# Patient Record
Sex: FEMALE | Race: WHITE | Employment: FULL TIME | URBAN - METROPOLITAN AREA
[De-identification: names, ages, dates, MRNs, and addresses within clinical notes are randomized per-mention and may not be internally consistent; named-entity substitution may affect disease eponyms.]

---

## 2017-02-09 ENCOUNTER — HOSPITAL ENCOUNTER (OUTPATIENT)
Age: 57
Setting detail: OUTPATIENT SURGERY
Discharge: HOME | End: 2017-02-09
Attending: ORTHOPAEDIC SURGERY | Admitting: ORTHOPAEDIC SURGERY
Payer: COMMERCIAL

## 2017-02-09 ENCOUNTER — ANESTHESIA EVENT (OUTPATIENT)
Dept: SURGERY | Age: 57
End: 2017-02-09
Payer: COMMERCIAL

## 2017-02-09 ENCOUNTER — ANESTHESIA (OUTPATIENT)
Dept: SURGERY | Age: 57
End: 2017-02-09
Payer: COMMERCIAL

## 2017-02-09 VITALS
SYSTOLIC BLOOD PRESSURE: 109 MMHG | DIASTOLIC BLOOD PRESSURE: 67 MMHG | BODY MASS INDEX: 29.23 KG/M2 | OXYGEN SATURATION: 98 % | WEIGHT: 165 LBS | TEMPERATURE: 97.7 F | HEART RATE: 77 BPM | RESPIRATION RATE: 16 BRPM

## 2017-02-09 PROCEDURE — 77030019940 HC BLNKT HYPOTHRM STRY -B: Performed by: NURSE ANESTHETIST, CERTIFIED REGISTERED

## 2017-02-09 PROCEDURE — 77030025157 HC CUF TRNQT 1 ZIMM -B: Performed by: ORTHOPAEDIC SURGERY

## 2017-02-09 PROCEDURE — 77030020143 HC AIRWY LARYN INTUB CGAS -A: Performed by: NURSE ANESTHETIST, CERTIFIED REGISTERED

## 2017-02-09 PROCEDURE — 76210000020 HC REC RM PH II FIRST 0.5 HR: Performed by: ORTHOPAEDIC SURGERY

## 2017-02-09 PROCEDURE — 76060000032 HC ANESTHESIA 0.5 TO 1 HR: Performed by: ORTHOPAEDIC SURGERY

## 2017-02-09 PROCEDURE — 74011250636 HC RX REV CODE- 250/636: Performed by: ORTHOPAEDIC SURGERY

## 2017-02-09 PROCEDURE — 76210000063 HC OR PH I REC FIRST 0.5 HR: Performed by: ORTHOPAEDIC SURGERY

## 2017-02-09 PROCEDURE — 77030002933 HC SUT MCRYL J&J -A: Performed by: ORTHOPAEDIC SURGERY

## 2017-02-09 PROCEDURE — 74011250637 HC RX REV CODE- 250/637: Performed by: ANESTHESIOLOGY

## 2017-02-09 PROCEDURE — 74011000250 HC RX REV CODE- 250: Performed by: ORTHOPAEDIC SURGERY

## 2017-02-09 PROCEDURE — 74011250636 HC RX REV CODE- 250/636

## 2017-02-09 PROCEDURE — 77030011640 HC PAD GRND REM COVD -A: Performed by: ORTHOPAEDIC SURGERY

## 2017-02-09 PROCEDURE — 77030002916 HC SUT ETHLN J&J -A: Performed by: ORTHOPAEDIC SURGERY

## 2017-02-09 PROCEDURE — 74011000250 HC RX REV CODE- 250

## 2017-02-09 PROCEDURE — 77030018836 HC SOL IRR NACL ICUM -A: Performed by: ORTHOPAEDIC SURGERY

## 2017-02-09 PROCEDURE — 76010000138 HC OR TIME 0.5 TO 1 HR: Performed by: ORTHOPAEDIC SURGERY

## 2017-02-09 PROCEDURE — 74011250636 HC RX REV CODE- 250/636: Performed by: ANESTHESIOLOGY

## 2017-02-09 PROCEDURE — 77030011884 HC TAPE CST PLSTR BSNM -A: Performed by: ORTHOPAEDIC SURGERY

## 2017-02-09 RX ORDER — DEXAMETHASONE SODIUM PHOSPHATE 4 MG/ML
INJECTION, SOLUTION INTRA-ARTICULAR; INTRALESIONAL; INTRAMUSCULAR; INTRAVENOUS; SOFT TISSUE AS NEEDED
Status: DISCONTINUED | OUTPATIENT
Start: 2017-02-09 | End: 2017-02-09 | Stop reason: HOSPADM

## 2017-02-09 RX ORDER — BUPIVACAINE HYDROCHLORIDE 5 MG/ML
INJECTION, SOLUTION EPIDURAL; INTRACAUDAL AS NEEDED
Status: DISCONTINUED | OUTPATIENT
Start: 2017-02-09 | End: 2017-02-09 | Stop reason: HOSPADM

## 2017-02-09 RX ORDER — SODIUM CHLORIDE 0.9 % (FLUSH) 0.9 %
5-10 SYRINGE (ML) INJECTION AS NEEDED
Status: DISCONTINUED | OUTPATIENT
Start: 2017-02-09 | End: 2017-02-09 | Stop reason: HOSPADM

## 2017-02-09 RX ORDER — CEFAZOLIN SODIUM IN 0.9 % NACL 2 G/50 ML
2 INTRAVENOUS SOLUTION, PIGGYBACK (ML) INTRAVENOUS ONCE
Status: COMPLETED | OUTPATIENT
Start: 2017-02-09 | End: 2017-02-09

## 2017-02-09 RX ORDER — FAMOTIDINE 20 MG/1
20 TABLET, FILM COATED ORAL ONCE
Status: COMPLETED | OUTPATIENT
Start: 2017-02-09 | End: 2017-02-09

## 2017-02-09 RX ORDER — SODIUM CHLORIDE 0.9 % (FLUSH) 0.9 %
5-10 SYRINGE (ML) INJECTION EVERY 8 HOURS
Status: DISCONTINUED | OUTPATIENT
Start: 2017-02-09 | End: 2017-02-09 | Stop reason: HOSPADM

## 2017-02-09 RX ORDER — LIDOCAINE HYDROCHLORIDE 10 MG/ML
0.3 INJECTION INFILTRATION; PERINEURAL ONCE
Status: DISCONTINUED | OUTPATIENT
Start: 2017-02-09 | End: 2017-02-09 | Stop reason: HOSPADM

## 2017-02-09 RX ORDER — ONDANSETRON 2 MG/ML
INJECTION INTRAMUSCULAR; INTRAVENOUS AS NEEDED
Status: DISCONTINUED | OUTPATIENT
Start: 2017-02-09 | End: 2017-02-09 | Stop reason: HOSPADM

## 2017-02-09 RX ORDER — FENTANYL CITRATE 50 UG/ML
INJECTION, SOLUTION INTRAMUSCULAR; INTRAVENOUS AS NEEDED
Status: DISCONTINUED | OUTPATIENT
Start: 2017-02-09 | End: 2017-02-09 | Stop reason: HOSPADM

## 2017-02-09 RX ORDER — HYDROMORPHONE HYDROCHLORIDE 2 MG/ML
0.5 INJECTION, SOLUTION INTRAMUSCULAR; INTRAVENOUS; SUBCUTANEOUS
Status: DISCONTINUED | OUTPATIENT
Start: 2017-02-09 | End: 2017-02-09 | Stop reason: HOSPADM

## 2017-02-09 RX ORDER — SODIUM CHLORIDE, SODIUM LACTATE, POTASSIUM CHLORIDE, CALCIUM CHLORIDE 600; 310; 30; 20 MG/100ML; MG/100ML; MG/100ML; MG/100ML
100 INJECTION, SOLUTION INTRAVENOUS CONTINUOUS
Status: DISCONTINUED | OUTPATIENT
Start: 2017-02-09 | End: 2017-02-09 | Stop reason: HOSPADM

## 2017-02-09 RX ORDER — ACETAMINOPHEN 500 MG
1000 TABLET ORAL ONCE
Status: COMPLETED | OUTPATIENT
Start: 2017-02-09 | End: 2017-02-09

## 2017-02-09 RX ORDER — MIDAZOLAM HYDROCHLORIDE 1 MG/ML
2 INJECTION, SOLUTION INTRAMUSCULAR; INTRAVENOUS
Status: COMPLETED | OUTPATIENT
Start: 2017-02-09 | End: 2017-02-09

## 2017-02-09 RX ORDER — PROPOFOL 10 MG/ML
INJECTION, EMULSION INTRAVENOUS AS NEEDED
Status: DISCONTINUED | OUTPATIENT
Start: 2017-02-09 | End: 2017-02-09 | Stop reason: HOSPADM

## 2017-02-09 RX ORDER — LIDOCAINE HYDROCHLORIDE 20 MG/ML
INJECTION, SOLUTION EPIDURAL; INFILTRATION; INTRACAUDAL; PERINEURAL AS NEEDED
Status: DISCONTINUED | OUTPATIENT
Start: 2017-02-09 | End: 2017-02-09 | Stop reason: HOSPADM

## 2017-02-09 RX ORDER — OXYCODONE HYDROCHLORIDE 5 MG/1
10 TABLET ORAL
Status: DISCONTINUED | OUTPATIENT
Start: 2017-02-09 | End: 2017-02-09 | Stop reason: HOSPADM

## 2017-02-09 RX ADMIN — FENTANYL CITRATE 25 MCG: 50 INJECTION, SOLUTION INTRAMUSCULAR; INTRAVENOUS at 13:29

## 2017-02-09 RX ADMIN — DEXAMETHASONE SODIUM PHOSPHATE 4 MG: 4 INJECTION, SOLUTION INTRA-ARTICULAR; INTRALESIONAL; INTRAMUSCULAR; INTRAVENOUS; SOFT TISSUE at 13:32

## 2017-02-09 RX ADMIN — FENTANYL CITRATE 25 MCG: 50 INJECTION, SOLUTION INTRAMUSCULAR; INTRAVENOUS at 13:19

## 2017-02-09 RX ADMIN — ONDANSETRON 4 MG: 2 INJECTION INTRAMUSCULAR; INTRAVENOUS at 13:32

## 2017-02-09 RX ADMIN — FAMOTIDINE 20 MG: 20 TABLET ORAL at 12:43

## 2017-02-09 RX ADMIN — LIDOCAINE HYDROCHLORIDE 100 MG: 20 INJECTION, SOLUTION EPIDURAL; INFILTRATION; INTRACAUDAL; PERINEURAL at 13:19

## 2017-02-09 RX ADMIN — FENTANYL CITRATE 50 MCG: 50 INJECTION, SOLUTION INTRAMUSCULAR; INTRAVENOUS at 13:32

## 2017-02-09 RX ADMIN — ACETAMINOPHEN 1000 MG: 500 TABLET, FILM COATED ORAL at 12:43

## 2017-02-09 RX ADMIN — CEFAZOLIN 2 G: 1 INJECTION, POWDER, FOR SOLUTION INTRAMUSCULAR; INTRAVENOUS; PARENTERAL at 13:22

## 2017-02-09 RX ADMIN — SODIUM CHLORIDE, SODIUM LACTATE, POTASSIUM CHLORIDE, AND CALCIUM CHLORIDE 100 ML/HR: 600; 310; 30; 20 INJECTION, SOLUTION INTRAVENOUS at 12:44

## 2017-02-09 RX ADMIN — MIDAZOLAM HYDROCHLORIDE 2 MG: 1 INJECTION, SOLUTION INTRAMUSCULAR; INTRAVENOUS at 12:46

## 2017-02-09 RX ADMIN — PROPOFOL 200 MG: 10 INJECTION, EMULSION INTRAVENOUS at 13:19

## 2017-02-09 NOTE — IP AVS SNAPSHOT
Current Discharge Medication List  
  
ASK your doctor about these medications Dose & Instructions Dispensing Information Comments Morning Noon Evening Bedtime Biotin 2,500 mcg Cap Your next dose is: Today, Tomorrow Other:  ____________ Dose:  1 Tab Take 1 Tab by mouth daily. Hold for surgery- last dose 2/6/17 Refills:  0  
     
   
   
   
  
 buPROPion  mg tablet Commonly known as:  Patti Chavezr Your next dose is: Today, Tomorrow Other:  ____________ Dose:  150 mg Take 1 Tab by mouth every morning. Indications: ANXIETY WITH DEPRESSION Quantity:  30 Tab Refills:  11 CALCIUM 600 WITH VITAMIN D3 600 mg(1,500mg) -400 unit Cap Generic drug:  Calcium-Cholecalciferol (D3) Your next dose is: Today, Tomorrow Other:  ____________ Dose:  1 Tab Take 1 Tab by mouth daily. Hold for surgery- last dose 2/6/17 Refills:  0  
     
   
   
   
  
 clonazePAM 1 mg tablet Commonly known as:  Beatrice Cos Your next dose is: Today, Tomorrow Other:  ____________ Take one tablet every evening. Quantity:  30 Tab Refills:  5 FISH OIL 1,000 mg Cap Generic drug:  omega-3 fatty acids-vitamin e Your next dose is: Today, Tomorrow Other:  ____________ Dose:  1 Cap Take 1 Cap by mouth daily. Hold for surgery- last dose 2/6/17 Refills:  0  
     
   
   
   
  
 promethazine 12.5 mg tablet Commonly known as:  PHENERGAN Your next dose is: Today, Tomorrow Other:  ____________ Dose:  12.5 mg Take 1 Tab by mouth every six (6) hours as needed for Nausea for 7 days. Quantity:  20 Tab Refills:  0  
     
   
   
   
  
 VITAMIN C PO Your next dose is: Today, Tomorrow Other:  ____________ Dose:  1 Tab Take 1 Tab by mouth daily. Hold for surgery- last dose 2/6/17 Refills:  0  
     
   
   
   
  
 vitamin e 400 unit Tab Your next dose is: Today, Tomorrow Other:  ____________ Dose:  1 Tab Take 1 Tab by mouth daily. Hold for surgery- last dose 2/6/17 Refills:  0 ZINC ACETATE PO Your next dose is: Today, Tomorrow Other:  ____________ Dose:  1 Tab Take 1 Tab by mouth daily. Hold for surgery- last dose 2/6/17 Refills:  0

## 2017-02-09 NOTE — ANESTHESIA POSTPROCEDURE EVALUATION
Post-Anesthesia Evaluation and Assessment    Patient: Alec Montalvo MRN: [id number]  SSN: [id number]    YOB: 1960  Age: 55 y.o. Sex: female       Cardiovascular Function/Vital Signs  Visit Vitals    /67    Pulse 77    Temp 36.5 °C (97.7 °F)    Resp 16    Wt 74.8 kg (165 lb)    SpO2 98%    BMI 29.23 kg/m2       Patient is status post general anesthesia for Procedure(s):  RIGHT 1ST METATARSOPHALANGEAL CHEILECTOMY. Nausea/Vomiting: None    Postoperative hydration reviewed and adequate. Pain:  Pain Scale 1: Numeric (0 - 10) (02/09/17 1412)  Pain Intensity 1: 0 (02/09/17 1412)   Managed    Neurological Status:   Neuro (WDL): Within Defined Limits (02/09/17 1412)   At baseline    Mental Status and Level of Consciousness: Alert and oriented     Pulmonary Status:   O2 Device: Room air (02/09/17 1412)   Adequate oxygenation and airway patent    Complications related to anesthesia: None    Post-anesthesia assessment completed.  No concerns    Signed By: Shanae Crain MD     February 9, 2017

## 2017-02-09 NOTE — DISCHARGE INSTRUCTIONS
INSTRUCTIONS FOLLOWING FOOT SURGERY    ACTIVITY  Elevate foot   Protected partial weight bearing on the heel only as tolerated in post op shoe after full sensation retur  Take one 325mg aspirin daily if okay with your medical doctor and you have no GI ulcer. Get up and out of bed frequently. While in bed move the legs as much as possible)    DIET  Clear liquids until no nausea or vomiting; then light diet for the first day. Advance to regular diet on second day, unless your doctor orders otherwise. PAIN  Take pain medications as directed by your doctor. Call your doctor if pain is NOT relieved by medication. DRESSING CARE Keep dry and in place until follow up appointment    CALL YOUR DOCTOR IF YOU HAVE  Excessive bleeding that does not stop after holding mild pressure over the area. Temperature of 101 degrees or above. Redness, excessive swelling or bruising, and/or green or yellow, smelly discharge from incision. Loss of sensation - cold, white or blue toes. AFTER ANESTHESIA  For the first 24 hours and while taking narcotics for pain: DO NOT Drive, Drink Alcoholic beverages, or make important Decisions. Be aware of dizziness following anesthesia and while taking pain medication. Preventing Infection at Home  We care about preventing infection and avoiding the spread of germs - not only when you are in the hospital but also when you return home. When you return home from the hospital, its important to take the following steps to help prevent infection and avoid spreading germs that could infect you and others. Ask everyone in your home to follow these guidelines, too. Clean Your Hands  · Clean your hands whenever your hands are visibly dirty, before you eat, before or after touching your mouth, nose or eyes, and before preparing food. Clean them after contact with body fluids, using the restroom, touching animals or changing diapers.   · When washing hands, wet them with warm water and work up a lather. Rub hands for at least 15 seconds, then rinse them and pat them dry with a clean towel or paper towel. · When using hand sanitizers, it should take about 15 seconds to rub your hands dry. If not, you probably didnt apply enough . Cover Your Sneeze or Cough  Germs are released into the air whenever you sneeze or cough. To prevent the spread of infection:  · Turn away from other people before coughing or sneezing. · Cover your mouth or nose with a tissue when you cough or sneeze. Put the tissue in the trash. · If you dont have a tissue, cough or sneeze into your upper sleeve, not your hands. · Always clean your hands after coughing or sneezing. Care for Wounds  Your skin is your bodys first line of defense against germs, but an open wound leaves an easy way for germs to enter your body. To prevent infection:  · Clean your hands before and after changing wound dressings, and wear gloves to change dressings if recommended by your doctor. · Take special care with IV lines or other devices inserted into the body. If you must touch them, clean your hands first.  · Follow any specific instructions from your doctor to care for your wounds. Contact your doctor if you experience any signs of infection, such as fever or increased redness at the surgical or wound site. Keep a Clean Home  · Clean or wipe commonly touched hard surfaces like door handles, sinks, tabletops, phones and TV remotes. · Use products labeled disinfectant to kill harmful bacteria and viruses. · Use a clean cloth or paper towel to clean and dry surfaces. Wiping surfaces with a dirty dishcloth, sponge or towel will only spread germs. · Never share toothbrushes, xiong, drinking glasses, utensils, razor blades, face cloths or bath towels to avoid spreading germs. · Be sure that the linens that you sleep on are clean. · Keep pets away from wounds and wash your hands after touching pets, their toys or bedding.     We care about you and your health. Remember, preventing infections is a team effort between you, your family, friends and health care providers. APPOINTMENT DATE/TIME Keep as scheduled    YOUR DOCTOR'S PHONE NUMBER [phone]      DISCHARGE SUMMARY from Nurse    PATIENT INSTRUCTIONS:    After general anesthesia or intravenous sedation, for 24 hours or while taking prescription Narcotics:  · Limit your activities  · Do not drive and operate hazardous machinery  · Do not make important personal or business decisions  · Do  not drink alcoholic beverages  · If you have not urinated within 8 hours after discharge, please contact your surgeon on call. *  Please give a list of your current medications to your Primary Care Provider. *  Please update this list whenever your medications are discontinued, doses are      changed, or new medications (including over-the-counter products) are added. *  Please carry medication information at all times in case of emergency situations. These are general instructions for a healthy lifestyle:    No smoking/ No tobacco products/ Avoid exposure to second hand smoke    Surgeon General's Warning:  Quitting smoking now greatly reduces serious risk to your health. Obesity, smoking, and sedentary lifestyle greatly increases your risk for illness    A healthy diet, regular physical exercise & weight monitoring are important for maintaining a healthy lifestyle    You may be retaining fluid if you have a history of heart failure or if you experience any of the following symptoms:  Weight gain of 3 pounds or more overnight or 5 pounds in a week, increased swelling in our hands or feet or shortness of breath while lying flat in bed. Please call your doctor as soon as you notice any of these symptoms; do not wait until your next office visit.     Recognize signs and symptoms of STROKE:    F-face looks uneven    A-arms unable to move or move unevenly    S-speech slurred or non-existent    T-time-call 911 as soon as signs and symptoms begin-DO NOT go       Back to bed or wait to see if you get better-TIME IS BRAIN.

## 2017-02-09 NOTE — BRIEF OP NOTE
BRIEF OPERATIVE NOTE    Date of Procedure: 2/9/2017   Preoperative Diagnosis: Hallux rigidus of right foot [M20.21]  Postoperative Diagnosis: Hallux rigidus of right foot    Procedure(s):  RIGHT 1ST METATARSOPHALANGEAL CHEILECTOMY  Surgeon(s) and Role:     * Shannon Lynne MD - Primary            Surgical Staff:  Circ-1: Giovanni Will RN  Scrub Tech-1: Vane Hampton  Event Time In   Incision Start 1329   Incision Close 1343     Anesthesia: General   Estimated Blood Loss: min  Specimens: * No specimens in log *   Findings: no  Complications: no  Implants: * No implants in log *

## 2017-02-09 NOTE — IP AVS SNAPSHOT
[address] 
[phone] Patient: Colletta Ceo MRN: [id number] :1960 You are allergic to the following Allergen Reactions Lamisil (Terbinafine) Rash Recent Documentation Weight BMI OB Status Smoking Status 74.8 kg 29.23 kg/m2 Postmenopausal Former Smoker Emergency Contacts Name Discharge Info Relation Home Work Mobile Haroon Rea DISCHARGE CAREGIVER [3] Spouse [3] [phone] About your hospitalization You were admitted on:  2017 You last received care in the:  [facility] OP PACU You were discharged on:  2017 Unit phone number:  [phone] Why you were hospitalized Your primary diagnosis was:  Not on File Providers Seen During Your Hospitalizations Provider Role Specialty Primary office phone Lissa Bose MD Attending Provider Orthopedic Surgery [phone] Your Primary Care Physician (PCP) Primary Care Physician Office Phone Office Fax Maria Antonia Bates [phone] Follow-up Information None Your Appointments  CHEILECTOMY with Lissa Bose MD  
[facility] [address] ([address] [place] [address]) [address]  
[phone] 2017  9:15 AM EDT  
[facility] SCREENING with [facility] BI ROOM 1 [facility] [place] [facility] ([address] [place] [address]) [address] [place] [address]  
[phone] ***** NOTE: Appointments for the Mobile Mammography UNIT CANNOT be made on My Chart *****  PATIENT ARRIVAL - Please report 30 minutes early to check in. GENERAL INSTRUCTIONS -  On the day of your exam do not use any bath powder, deodorant or lotions on the chest or armpit area.   Wear two-piece outfit for ease of changing. Allow at least 1 hour for test.  
  
    
  
  
 
  
  
  
Current Discharge Medication List  
  
ASK your doctor about these medications Dose & Instructions Dispensing Information Comments Morning Noon Evening Bedtime Biotin 2,500 mcg Cap Your next dose is: Today, Tomorrow Other:  _________ Dose:  1 Tab Take 1 Tab by mouth daily. Hold for surgery- last dose 2/6/17 Refills:  0  
     
   
   
   
  
 buPROPion  mg tablet Commonly known as:  Pankaj Greaser Your next dose is: Today, Tomorrow Other:  _________ Dose:  150 mg Take 1 Tab by mouth every morning. Indications: ANXIETY WITH DEPRESSION Quantity:  30 Tab Refills:  11 CALCIUM 600 WITH VITAMIN D3 600 mg(1,500mg) -400 unit Cap Generic drug:  Calcium-Cholecalciferol (D3) Your next dose is: Today, Tomorrow Other:  _________ Dose:  1 Tab Take 1 Tab by mouth daily. Hold for surgery- last dose 2/6/17 Refills:  0  
     
   
   
   
  
 clonazePAM 1 mg tablet Commonly known as:  Paulo Neo Your next dose is: Today, Tomorrow Other:  _________ Take one tablet every evening. Quantity:  30 Tab Refills:  5 FISH OIL 1,000 mg Cap Generic drug:  omega-3 fatty acids-vitamin e Your next dose is: Today, Tomorrow Other:  _________ Dose:  1 Cap Take 1 Cap by mouth daily. Hold for surgery- last dose 2/6/17 Refills:  0  
     
   
   
   
  
 promethazine 12.5 mg tablet Commonly known as:  PHENERGAN Your next dose is: Today, Tomorrow Other:  _________ Dose:  12.5 mg Take 1 Tab by mouth every six (6) hours as needed for Nausea for 7 days. Quantity:  20 Tab Refills:  0  
     
   
   
   
  
 VITAMIN C PO Your next dose is: Today, Tomorrow Other:  _________ Dose:  1 Tab Take 1 Tab by mouth daily. Hold for surgery- last dose 2/6/17 Refills:  0  
     
   
   
   
  
 vitamin e 400 unit Tab Your next dose is: Today, Tomorrow Other:  _________ Dose:  1 Tab Take 1 Tab by mouth daily. Hold for surgery- last dose 2/6/17 Refills:  0 ZINC ACETATE PO Your next dose is: Today, Tomorrow Other:  _________ Dose:  1 Tab Take 1 Tab by mouth daily. Hold for surgery- last dose 2/6/17 Refills:  0 Discharge Instructions INSTRUCTIONS FOLLOWING FOOT SURGERY 
 
ACTIVITY Elevate foot Protected partial weight bearing on the heel only as tolerated in post op shoe after full sensation retur Take one 325mg aspirin daily if okay with your medical doctor and you have no GI ulcer. Get up and out of bed frequently. While in bed move the legs as much as possible) DIET Clear liquids until no nausea or vomiting; then light diet for the first day. Advance to regular diet on second day, unless your doctor orders otherwise. PAIN Take pain medications as directed by your doctor. Call your doctor if pain is NOT relieved by medication. DRESSING CARE Keep dry and in place until follow up appointment CALL YOUR DOCTOR IF YOU HAVE Excessive bleeding that does not stop after holding mild pressure over the area. Temperature of 101 degrees or above. Redness, excessive swelling or bruising, and/or green or yellow, smelly discharge from incision. Loss of sensation - cold, white or blue toes. AFTER ANESTHESIA For the first 24 hours and while taking narcotics for pain: DO NOT Drive, Drink Alcoholic beverages, or make important Decisions. Be aware of dizziness following anesthesia and while taking pain medication. Preventing Infection at Home We care about preventing infection and avoiding the spread of germs  not only when you are in the hospital but also when you return home. When you return home from the hospital, its important to take the following steps to help prevent infection and avoid spreading germs that could infect you and others. Ask everyone in your home to follow these guidelines, too. Clean Your Hands · Clean your hands whenever your hands are visibly dirty, before you eat, before or after touching your mouth, nose or eyes, and before preparing food. Clean them after contact with body fluids, using the restroom, touching animals or changing diapers. · When washing hands, wet them with warm water and work up a lather. Rub hands for at least 15 seconds, then rinse them and pat them dry with a clean towel or paper towel. · When using hand sanitizers, it should take about 15 seconds to rub your hands dry. If not, you probably didnt apply enough . Cover Your Sneeze or Cough Germs are released into the air whenever you sneeze or cough. To prevent the spread of infection: · Turn away from other people before coughing or sneezing. · Cover your mouth or nose with a tissue when you cough or sneeze. Put the tissue in the trash. · If you dont have a tissue, cough or sneeze into your upper sleeve, not your hands. · Always clean your hands after coughing or sneezing. Care for Wounds Your skin is your bodys first line of defense against germs, but an open wound leaves an easy way for germs to enter your body. To prevent infection: · Clean your hands before and after changing wound dressings, and wear gloves to change dressings if recommended by your doctor. · Take special care with IV lines or other devices inserted into the body. If you must touch them, clean your hands first. 
· Follow any specific instructions from your doctor to care for your wounds. Contact your doctor if you experience any signs of infection, such as fever or increased redness at the surgical or wound site. Keep a [address] [address] · Clean or wipe commonly touched hard surfaces like door handles, sinks, tabletops, phones and TV remotes. · Use products labeled disinfectant to kill harmful bacteria and viruses. · Use a clean cloth or paper towel to clean and dry surfaces. Wiping surfaces with a dirty dishcloth, sponge or towel will only spread germs. · Never share toothbrushes, xiong, drinking glasses, utensils, razor blades, face cloths or bath towels to avoid spreading germs. · Be sure that the linens that you sleep on are clean. · Keep pets away from wounds and wash your hands after touching pets, their toys or bedding. We care about you and your health. Remember, preventing infections is a team effort between you, your family, friends and health care providers. APPOINTMENT DATE/TIME Keep as scheduled YOUR DOCTOR'S PHONE NUMBER [phone] DISCHARGE SUMMARY from Nurse PATIENT INSTRUCTIONS: 
 
After general anesthesia or intravenous sedation, for 24 hours or while taking prescription Narcotics: · Limit your activities · Do not drive and operate hazardous machinery · Do not make important personal or business decisions · Do  not drink alcoholic beverages · If you have not urinated within 8 hours after discharge, please contact your surgeon on call. *  Please give a list of your current medications to your Primary Care Provider. *  Please update this list whenever your medications are discontinued, doses are 
    changed, or new medications (including over-the-counter products) are added. *  Please carry medication information at all times in case of emergency situations. These are general instructions for a healthy lifestyle: No smoking/ No tobacco products/ Avoid exposure to second hand smoke Surgeon General's Warning:  Quitting smoking now greatly reduces serious risk to your health. Obesity, smoking, and sedentary lifestyle greatly increases your risk for illness A healthy diet, regular physical exercise & weight monitoring are important for maintaining a healthy lifestyle You may be retaining fluid if you have a history of heart failure or if you experience any of the following symptoms:  Weight gain of 3 pounds or more overnight or 5 pounds in a week, increased swelling in our hands or feet or shortness of breath while lying flat in bed. Please call your doctor as soon as you notice any of these symptoms; do not wait until your next office visit. Recognize signs and symptoms of STROKE: 
 
F-face looks uneven A-arms unable to move or move unevenly S-speech slurred or non-existent T-time-call 911 as soon as signs and symptoms begin-DO NOT go Back to bed or wait to see if you get better-TIME IS BRAIN. Discharge Orders None Introducing [facility]! Morteza Vann introduces [employer] patient portal. Now you can access parts of your medical record, email your doctor's office, and request medication refills online. 1. In your internet browser, go to [email or web address] 2. Click on the First Time User? Click Here link in the Sign In box. You will see the New Member Sign Up page. 3. Enter your [employer] Access Code exactly as it appears below. You will not need to use this code after youve completed the sign-up process. If you do not sign up before the expiration date, you must request a new code. · [employer] Access Code: [id number] [facility] Expires: 4/26/2017  4:13 PM 
 
4. Enter the last four digits of your Social Security Number (xxxx) and Date of Birth (mm/dd/yyyy) as indicated and click Submit. You will be taken to the next sign-up page. 5. Create a [employer] ID. This will be your [employer] login ID and cannot be changed, so think of one that is secure and easy to remember. 6. Create a [employer] password. You can change your password at any time. 7. Enter your Password Reset Question and Answer.  This can be used at a later time if you forget your password. 8. Enter your e-mail address. You will receive e-mail notification when new information is available in [address] 9. Click Sign Up. You can now view and download portions of your medical record. 10. Click the Download Summary menu link to download a portable copy of your medical information. If you have questions, please visit the Frequently Asked Questions section of the [email or web address] website. Remember, [email or web address] is NOT to be used for urgent needs. For medical emergencies, dial 911. Now available from your iPhone and Android! General Information Please provide this summary of care documentation to your next provider. Patient Signature:  ____________________________________________________________ Date:  ____________________________________________________________  
  
Izzy Mo Provider Signature:  ____________________________________________________________ Date:  ____________________________________________________________

## 2017-02-09 NOTE — ANESTHESIA PREPROCEDURE EVALUATION
Anesthetic History   No history of anesthetic complications            Review of Systems / Medical History  Patient summary reviewed and pertinent labs reviewed    Pulmonary  Within defined limits                 Neuro/Psych         Psychiatric history (anxiety)     Cardiovascular                  Exercise tolerance: >4 METS  Comments: RBBB   GI/Hepatic/Renal  Within defined limits              Endo/Other  Within defined limits           Other Findings              Physical Exam    Airway  Mallampati: I  TM Distance: 4 - 6 cm  Neck ROM: normal range of motion   Mouth opening: Normal     Cardiovascular    Rhythm: regular  Rate: normal         Dental  No notable dental hx       Pulmonary  Breath sounds clear to auscultation               Abdominal         Other Findings            Anesthetic Plan    ASA: 1  Anesthesia type: general          Induction: Intravenous  Anesthetic plan and risks discussed with: Patient

## 2017-02-10 NOTE — OP NOTES
[facility] 66   OPERATIVE REPORT       Name:  Maeve Thomson   MR#:  [id number]   :  1960   Account #:  [de-identified]   Date of Adm:  2017       DATE OF PROCEDURE: 2017     PREOPERATIVE DIAGNOSIS: Right hallux rigidus. POSTOPERATIVE DIAGNOSIS: Right hallux rigidus. PROCEDURE PERFORMED: Right first metatarsophalangeal   cheilectomy. SURGEON: Mechelle Alonzo MD    ANESTHESIA: General anesthesia with LMA. ESTIMATED BLOOD LOSS: Minimal.      TOURNIQUET TIME: 15 minutes at 250 mmHg. ANTIBIOTIC PROPHYLAXIS: Ancef given prior to procedure. INDICATIONS FOR PROCEDURE: The patient is a 50-year-old white   female with symptomatic right grade 2 hallux rigidus who has   failed conservative therapy and desires surgical treatment. Risks and benefits of procedure including, but not limited to   risk of anesthetic complications including myocardial   infarction, stroke, and death, as well as surgical complications   including damage to nerves and blood vessels, risk of infection,   incomplete pain relief, risk of recurrence, need for additional   surgery have been discussed at length with the patient. She   understands the risks and wishes to proceed with surgery at this   time. DETAILS OF PROCEDURE: The patient's correct operative site was   marked with indelible ink in the preoperative holding area. She   was brought to the operating room, placed supine. During a preop   surgical timeout, the right lower extremity was identified as   the correct surgical site and prepped and draped in a standard   sterile fashion using ChloraPrep solution. Dorsal approach to   the first MTP joint was performed, followed by longitudinal   capsulotomy. The dorsal osteophytes were then removed off the   metatarsal head using a sagittal saw and a rongeur was then used   to decompress osteophytes off the base of the proximal phalanx   as well as decompress both gutters.  The wound was then irrigated   and closed using Vicryl in the capsule, followed by Monocryl and   nylon sutures on the skin. A sterile dressing was then applied. Anesthesia was discontinued. The patient was subsequently   transferred back to the recovery bed and taken to the recovery   room in satisfactory condition. She appeared to tolerate the   procedure well. There were no apparent surgical or anesthetic   complications. All needle and sponge counts were correct.         MD Carlos [place] / Nguyen Bias   D:  02/09/2017   16:49   T:  02/09/2017   19:38   Job #:  [id number]

## 2017-02-16 ENCOUNTER — APPOINTMENT (OUTPATIENT)
Dept: CT IMAGING | Age: 57
End: 2017-02-16
Attending: EMERGENCY MEDICINE
Payer: COMMERCIAL

## 2017-02-16 ENCOUNTER — HOSPITAL ENCOUNTER (EMERGENCY)
Age: 57
Discharge: HOME | End: 2017-02-16
Attending: EMERGENCY MEDICINE
Payer: COMMERCIAL

## 2017-02-16 VITALS
SYSTOLIC BLOOD PRESSURE: 153 MMHG | HEART RATE: 84 BPM | DIASTOLIC BLOOD PRESSURE: 73 MMHG | TEMPERATURE: 98 F | RESPIRATION RATE: 16 BRPM | OXYGEN SATURATION: 99 % | HEIGHT: 63 IN | WEIGHT: 163 LBS | BODY MASS INDEX: 28.88 KG/M2

## 2017-02-16 DIAGNOSIS — H53.8 BLURRED VISION: Primary | ICD-10-CM

## 2017-02-16 DIAGNOSIS — I15.9 SECONDARY HYPERTENSION: ICD-10-CM

## 2017-02-16 LAB
ALBUMIN SERPL BCP-MCNC: 4 G/DL (ref 3.5–5)
ALBUMIN/GLOB SERPL: 1.3 {RATIO} (ref 1.2–3.5)
ALP SERPL-CCNC: 94 U/L (ref 50–136)
ALT SERPL-CCNC: 24 U/L (ref 12–65)
ANION GAP BLD CALC-SCNC: 9 MMOL/L (ref 7–16)
AST SERPL W P-5'-P-CCNC: 11 U/L (ref 15–37)
BASOPHILS # BLD AUTO: 0.1 K/UL (ref 0–0.2)
BASOPHILS # BLD: 1 % (ref 0–2)
BILIRUB SERPL-MCNC: 0.5 MG/DL (ref 0.2–1.1)
BUN SERPL-MCNC: 14 MG/DL (ref 6–23)
CALCIUM SERPL-MCNC: 9.3 MG/DL (ref 8.3–10.4)
CHLORIDE SERPL-SCNC: 109 MMOL/L (ref 98–107)
CO2 SERPL-SCNC: 26 MMOL/L (ref 21–32)
CREAT SERPL-MCNC: 0.88 MG/DL (ref 0.6–1)
DIFFERENTIAL METHOD BLD: ABNORMAL
EOSINOPHIL # BLD: 0 K/UL (ref 0–0.8)
EOSINOPHIL NFR BLD: 0 % (ref 0.5–7.8)
ERYTHROCYTE [DISTWIDTH] IN BLOOD BY AUTOMATED COUNT: 12.3 % (ref 11.9–14.6)
GLOBULIN SER CALC-MCNC: 3.2 G/DL (ref 2.3–3.5)
GLUCOSE SERPL-MCNC: 90 MG/DL (ref 65–100)
HCT VFR BLD AUTO: 45.5 % (ref 35.8–46.3)
HGB BLD-MCNC: 15.1 G/DL (ref 11.7–15.4)
IMM GRANULOCYTES # BLD: 0 K/UL (ref 0–0.5)
IMM GRANULOCYTES NFR BLD AUTO: 0.4 % (ref 0–5)
LYMPHOCYTES # BLD AUTO: 26 % (ref 13–44)
LYMPHOCYTES # BLD: 2.1 K/UL (ref 0.5–4.6)
MAGNESIUM SERPL-MCNC: 2.3 MG/DL (ref 1.8–2.4)
MCH RBC QN AUTO: 31.1 PG (ref 26.1–32.9)
MCHC RBC AUTO-ENTMCNC: 33.2 G/DL (ref 31.4–35)
MCV RBC AUTO: 93.8 FL (ref 79.6–97.8)
MONOCYTES # BLD: 0.8 K/UL (ref 0.1–1.3)
MONOCYTES NFR BLD AUTO: 9 % (ref 4–12)
NEUTS SEG # BLD: 5.1 K/UL (ref 1.7–8.2)
NEUTS SEG NFR BLD AUTO: 64 % (ref 43–78)
PLATELET # BLD AUTO: 387 K/UL (ref 150–450)
PMV BLD AUTO: 9.5 FL (ref 10.8–14.1)
POTASSIUM SERPL-SCNC: 4 MMOL/L (ref 3.5–5.1)
PROT SERPL-MCNC: 7.2 G/DL (ref 6.3–8.2)
RBC # BLD AUTO: 4.85 M/UL (ref 4.05–5.25)
SODIUM SERPL-SCNC: 144 MMOL/L (ref 136–145)
TSH SERPL DL<=0.005 MIU/L-ACNC: 1.1 UIU/ML (ref 0.36–3.74)
WBC # BLD AUTO: 8.1 K/UL (ref 4.3–11.1)

## 2017-02-16 PROCEDURE — 85025 COMPLETE CBC W/AUTO DIFF WBC: CPT | Performed by: EMERGENCY MEDICINE

## 2017-02-16 PROCEDURE — 80053 COMPREHEN METABOLIC PANEL: CPT | Performed by: EMERGENCY MEDICINE

## 2017-02-16 PROCEDURE — 70450 CT HEAD/BRAIN W/O DYE: CPT

## 2017-02-16 PROCEDURE — 96361 HYDRATE IV INFUSION ADD-ON: CPT | Performed by: EMERGENCY MEDICINE

## 2017-02-16 PROCEDURE — 99284 EMERGENCY DEPT VISIT MOD MDM: CPT | Performed by: EMERGENCY MEDICINE

## 2017-02-16 PROCEDURE — 96360 HYDRATION IV INFUSION INIT: CPT | Performed by: EMERGENCY MEDICINE

## 2017-02-16 PROCEDURE — 83735 ASSAY OF MAGNESIUM: CPT | Performed by: EMERGENCY MEDICINE

## 2017-02-16 PROCEDURE — 84443 ASSAY THYROID STIM HORMONE: CPT | Performed by: EMERGENCY MEDICINE

## 2017-02-16 PROCEDURE — 74011250636 HC RX REV CODE- 250/636: Performed by: EMERGENCY MEDICINE

## 2017-02-16 RX ADMIN — SODIUM CHLORIDE 1000 ML: 900 INJECTION, SOLUTION INTRAVENOUS at 15:25
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
## 2017-02-16 NOTE — ED TRIAGE NOTES
Reports Dr. Kaleb Magana sent her for high blood pressure and had vision problem. States felt like she was going cross eyed and couldn't see. States the vision is cleared up now.

## 2017-03-27 PROBLEM — R73.03 PREDIABETES: Status: ACTIVE | Noted: 2017-03-27

## 2017-05-06 ENCOUNTER — HOSPITAL ENCOUNTER (OUTPATIENT)
Dept: MAMMOGRAPHY | Age: 57
Discharge: HOME | End: 2017-05-06
Attending: INTERNAL MEDICINE
Payer: COMMERCIAL

## 2017-05-06 DIAGNOSIS — Z12.31 VISIT FOR SCREENING MAMMOGRAM: ICD-10-CM

## 2017-05-06 PROCEDURE — 77067 SCR MAMMO BI INCL CAD: CPT

## 2018-01-31 ENCOUNTER — HOSPITAL ENCOUNTER (OUTPATIENT)
Dept: ULTRASOUND IMAGING | Age: 58
Discharge: HOME | End: 2018-01-31
Attending: SURGERY
Payer: COMMERCIAL

## 2018-01-31 DIAGNOSIS — E04.2 MULTINODULAR GOITER: ICD-10-CM

## 2018-01-31 PROCEDURE — 76536 US EXAM OF HEAD AND NECK: CPT

## 2018-02-01 PROBLEM — E04.2 MULTINODULAR GOITER: Status: ACTIVE | Noted: 2018-02-01

## 2018-04-30 PROBLEM — M79.18 MUSCLE ACHE OF EXTREMITY: Status: ACTIVE | Noted: 2018-04-30

## 2018-05-17 ENCOUNTER — HOSPITAL ENCOUNTER (OUTPATIENT)
Dept: MAMMOGRAPHY | Age: 58
Discharge: HOME | End: 2018-05-17
Attending: INTERNAL MEDICINE
Payer: COMMERCIAL

## 2018-05-17 DIAGNOSIS — Z12.31 VISIT FOR SCREENING MAMMOGRAM: ICD-10-CM

## 2018-05-17 PROCEDURE — 77067 SCR MAMMO BI INCL CAD: CPT

## 2018-06-06 PROBLEM — I10 ESSENTIAL HYPERTENSION: Status: ACTIVE | Noted: 2018-06-06

## 2018-06-19 ENCOUNTER — HOSPITAL ENCOUNTER (OUTPATIENT)
Dept: PHYSICAL THERAPY | Age: 58
Discharge: HOME | End: 2018-06-19
Attending: INTERNAL MEDICINE
Payer: COMMERCIAL

## 2018-06-19 DIAGNOSIS — M79.604 BILATERAL LEG PAIN: ICD-10-CM

## 2018-06-19 DIAGNOSIS — M79.605 BILATERAL LEG PAIN: ICD-10-CM

## 2018-06-19 DIAGNOSIS — R26.2 DIFFICULTY WALKING: ICD-10-CM

## 2018-06-19 PROCEDURE — 97161 PT EVAL LOW COMPLEX 20 MIN: CPT

## 2018-06-19 PROCEDURE — 97110 THERAPEUTIC EXERCISES: CPT

## 2018-06-19 NOTE — PROGRESS NOTES
Ambulatory/Rehab Services H2 Model Falls Risk Assessment    Risk Factor Pts. ·   Confusion/Disorientation/Impulsivity  []    4 ·   Symptomatic Depression  []   2 ·   Altered Elimination  []   1 ·   Dizziness/Vertigo  []   1 ·   Gender (Male)  []   1 ·   Any administered antiepileptics (anticonvulsants):  []   2 ·   Any administered benzodiazepines:  []   1 ·   Visual Impairment (specify):  []   1 ·   Portable Oxygen Use  []   1 ·   Orthostatic ? BP  []   1 ·   History of Recent Falls (within 3 mos.)  []   5     Ability to Rise from Chair (choose one) Pts. ·   Ability to rise in a single movement  [x]   0 ·   Pushes up, successful in one attempt  []   1 ·   Multiple attempts, but successful  []   3 ·   Unable to rise without assistance  []   4   Total: (5 or greater = High Risk) 0     Falls Prevention Plan:   []                Physical Limitations to Exercise (specify):   []                Mobility Assistance Device (type):   []                Exercise/Equipment Adaptation (specify):    ©2010 [facility] [address] [place] [address] Patent #[id number].  Federal Law prohibits the replication, distribution or use without written permission from [facility] [email or web address]

## 2018-06-19 NOTE — THERAPY EVALUATION
Anastacia Jang  : 1960      Payor: Mable Rutherford / Plan: [place] [facility] / Product Type: PPO /  [address] [facility], [address], [facility], [id number] [place] [address] Road  [phone]   [phone]       OUTPATIENT PHYSICAL THERAPY:Initial Assessment, Treatment Day: Day of Assessment and AM 2018    ICD-10: Treatment Diagnosis:    Low back pain (M54.5)        Bilateral leg pain [M79.604, M79.605]  Difficulty walking [R26.2]      Precautions/Allergies:   Lamisil [terbinafine]   Fall Risk Score: 0 (? 5 = High Risk)  MD Orders: Eval and Treat  MEDICAL/REFERRING DIAGNOSIS:  Bilateral leg pain [M79.604, M79.605]  Difficulty walking [R26.2]   DATE OF ONSET: Chronic  REFERRING PHYSICIAN: Timo Tilley MD  RETURN PHYSICIAN APPOINTMENT: TBD by Anastacia Jang      INITIAL ASSESSMENT:  Ms. Anastacia Jang has attended 1 physical therapy session including initial evaluation. Anastacia Jang presents with significant tightness to B hip internal rotators, and hip flexors. hamstrings and global LE (R > L). This is most likely due to her sitting at work and constantly in hip flexion/lower cross syndrome. Unable to perform JOSE L LE and requires much assist to get into position R LE. Strength additionally decreased as well as cardiopulmonary endurance. She voices that she has sat for majority of her work life and does not stretch regularly at home. Ambulation 30 minutes on treadmill is not painful. Pain exacerbates with prolonged sitting (>1 hour). Anterior rotation of R innominate present. Anastacia Jang will benefit from skilled PT (medically necessary) to address above deficits affecting participation in basic ADLs and overall functional tolerance.   Manual techniques (stretching, joint mobilizations, soft tissue mobilization/myofascial release), postural exercises/education, therapeutic techniques/activities, and HEP will be performed as appropriate addressing Je Rea's current condition. PROBLEM LIST (Impacting functional limitations):  1. Decreased Strength  2. Decreased ADL/Functional Activities  3. Decreased Transfer Abilities  4. Decreased Ambulation Ability/Technique  5. Decreased Balance  6. Increased Pain  7. Decreased Activity Tolerance  8. Increased Fatigue  9. Increased Shortness of Breath  10. Decreased Flexibility/Joint Mobility  11. Decreased Hope with Home Exercise Program INTERVENTIONS PLANNED:  1. Balance Exercise  2. Bed Mobility  3. Cold  4. Cryotherapy  5. Electrical Stimulation  6. Family Education  7. Gait Training  8. Heat  9. Home Exercise Program (HEP)  10. Manual Therapy  11. Neuromuscular Re-education/Strengthening  12. Range of Motion (ROM)  13. Therapeutic Activites  14. Therapeutic Exercise/Strengthening  15. Transfer Training   TREATMENT PLAN:  Effective Dates: 6.19.18 TO 9/17/2018 (90 days). Frequency/Duration: 2-3 times a week for 90 Days  GOALS: (Goals have been discussed and agreed upon with patient.)  Short Term Goals 4 weeks   1. Oswald Gutierrez will be independent with HEP  2. Oswald Gutierrez will participate in LE stretching program to increase flexibility  3. Oswald Gutierrez will participate in core stabilization exercises to help with stabilization during ADLs  4. Oswald Gutierrez will participate in LE strengthening program with weights as appropriate to help with gait and elevations  5. Oswald Gutierrez will participate in static and dynamic balance activities to decrease the risk for falls  6. Oswald Gutierrez will tolerate manual therapy/joint mobilizations/soft tissue to increase ROM and decrease pain  7. Oswald Gutierrez will be able to cross her legs with minimal difficulty and no pain. [address] will be able to get in and out of the car with minimal to no difficulty.     9. Oswald Gutierrez pelvis will stay level with regards to innominate position. Long Term Goals 8 weeks   1. Rachel Abarca will demonstrate a 8 point improvement on the Oswestry to show improvement in function  2. Rachel Abarca will report 0/10 pain at rest and during ADLs  3. Rachel Abarca will demonstrate 5/5 LE strength on manual muscle testing  4. Rachel Abarca will be able to perform SLS >5 seconds bilaterally to help with gait and improve balance    Rehabilitation Potential For Stated Goals: [facility] therapy, I certify that the treatment plan above will be carried out by a therapist or under their direction. Thank you for this referral,  Ken Vazquez DPT     Referring Physician Signature: Nika Cobian MD              Date                    HISTORY:   History of Present Injury/Illness (Reason for Referral): Rachel Abarca cannot cross her legs anymore and she has pain in her legs. She has increased stiffness. She talked with MD and explained she cannot cross her legs. She gets pain with crossing legs. She's been seeing Jackie Merritt at [facility] to address soft tissue and flexibility. Rachel [place]barca is from [place] and traveled a couple weekends ago. She had a PT treatment before she left and it was not as hard for her to sit in car as usual.  The pain is not localized and dull. She has been sitting for many years with her occupation. Denies hx of knee pain or back pain. Pain is mainly to lateral hips. PMHx reveals:    was trying to work out at [employer] but has been having more issues with walking  Has been having issues of leg and hip pain bilateral for 2 yrs  Her mobility has become restricted to the point she is unable to cross her legs and one leg is higher  She has difficulty with squats and pain with prolonged sitting  Immobility  In her glutes, psoas.  Adductors and hamstring and quads     Sent her for initial evaluation at [facility] and working on decrease in her pain and working on soft tissue      Now would like to pursue more PT thru [facility] as needs strengthening and conditioning as well        -Present symptoms/complaints (on day of evaluation)  Pain Scale:  · Current: 3/10  · Best: 3/10  · Worst:  7-8/10      Past Medical History/Comorbidities:   Ms. Payal Galvez  has a past medical history of Abnormal finding on EKG; Chicken pox; Depression; Headache; Insomnia, transient (12/13/12); Measles; Mumps; Thyroid disease; and Uterine prolapse (12/13/12). Ms. Payal Galvez  has a past surgical history that includes hx hysteroscopy with endometrial ablation (2002); hx appendectomy (age 12); hx lap cholecystectomy (~2013); hx splenectomy; and hx heent. Social History/Living Environment:     Marina Tomlinson works front office @ [employer]         Social History     Social History    Marital status:      Spouse name: N/A    Number of children: N/A    Years of education: N/A     Occupational History    Not on file. Social History Main Topics    Smoking status: Former Smoker     Packs/day: 0.25     Years: 10.00     Quit date: 2001    Smokeless tobacco: Never Used    Alcohol use Yes      Comment: 1-2 month    Drug use: No    Sexual activity: Yes     Partners: Male     Birth control/ protection: Surgical      Comment: vasectomy     Other Topics Concern    Not on file     Social History Narrative     Prior Level of Function/Work/Activity:  Independent.      Active Ambulatory Problems     Diagnosis Date Noted    Insomnia 11/17/2015    Perimenopausal 11/17/2015    Goiter 11/17/2015    Rosacea 11/17/2015    Postoperative hypothyroidism 03/30/2016    Hypercholesterolemia 03/30/2016    Prediabetes 03/27/2017    Multinodular goiter 02/01/2018    Muscle ache of extremity 04/30/2018    Essential hypertension 06/06/2018     Resolved Ambulatory Problems     Diagnosis Date Noted    No Resolved Ambulatory Problems     Past Medical History:   Diagnosis Date    Abnormal finding on EKG  Chicken pox     Depression     Headache     Insomnia, transient 12/13/12    Measles     Mumps     Thyroid disease     Uterine prolapse 12/13/12     Note: Patient denies any increase of symptoms with cough, sneeze or valsalva. Patient denies any saddle paresthesia or bowel/bladder deficits. Personal Factors:          Sex:  female        Age:  65 y.o. Current Medications:    Current Outpatient Prescriptions:     clonazePAM (KLONOPIN) 1 mg tablet, Take one tablet every evening., Disp: 30 Tab, Rfl: 5    ibuprofen (MOTRIN) 800 mg tablet, Take 1 Tab by mouth every eight (8) hours. , Disp: 30 Tab, Rfl: 0    losartan (COZAAR) 50 mg tablet, TAKE 1 TABLET DAILY FOR HYPERTENSION, Disp: 90 Tab, Rfl: 3    CINNAMON BARK-CHROMIUM PICOLIN PO, Take  by mouth., Disp: , Rfl:     LUTEIN PO, Take  by mouth., Disp: , Rfl:     MULTIVITAMIN WITH MINERALS (HAIR,SKIN AND NAILS PO), Take  by mouth., Disp: , Rfl:     L. ACID/L. CASEI/B.BIF/B.ARLYN/FOS (PROBIOTIC BLEND PO), Take  by mouth., Disp: , Rfl:     ASCORBATE CALCIUM (VITAMIN C PO), Take 1 Tab by mouth daily. Hold for surgery- last dose 2/6/17, Disp: , Rfl:     ZINC ACETATE PO, Take 1 Tab by mouth daily. Hold for surgery- last dose 2/6/17, Disp: , Rfl:     vitamin e 400 unit tab, Take 1 Tab by mouth daily. Hold for surgery- last dose 2/6/17, Disp: , Rfl:     Biotin 2,500 mcg cap, Take 1 Tab by mouth daily. Hold for surgery- last dose 2/6/17, Disp: , Rfl:     Calcium-Cholecalciferol, D3, (CALCIUM 600 WITH VITAMIN D3) 600 mg(1,500mg) -400 unit cap, Take 1 Tab by mouth daily. Hold for surgery- last dose 2/6/17, Disp: , Rfl:     omega-3 fatty acids-vitamin e (FISH OIL) 1,000 mg cap, Take 1 Cap by mouth daily.  Hold for surgery- last dose 2/6/17, Disp: , Rfl:      Date Last Reviewed:  6/19/2018   Number of Personal Factors/Comorbidities that affect the Plan of Care: 3+: HIGH COMPLEXITY   EXAMINATION:   Observation/Orthostatic Postural Assessment:          Tightness B hip flexors. Palpation:            ROM:            AROM/PROM         Joint: Eval Date: 6/19/18  Re-Assess Date:  Re-Assess Date:    Active ROM RIGHT LEFT RIGHT LEFT RIGHT LEFT   Knee Extension [facility]       Knee Flexion 130  DEG       Hip Flexion 40 deg  40 deg       Hip Abduction 10 DEG 20 DEG       Lumbar Flexion         Lumbar Extension         Lumbar Side-bending         Lumbar Rotation           Strength:     Eval Date:  6/19/18  Re-Assess Date:  Re-Assess Date:      RIGHT LEFT RIGHT LEFT RIGHT LEFT   Knee Flexion  5/5 5/5       Knee Extension (L3, L4) 4/5 4/5       Hip Flexion (L1, L2) 5/5 5/5       Hip Abduction (L5, S1) 4-/5 4-/5       Ankle Dorsiflexion  5/5 5/5       Great Toe Extension (L5) 5/5 5/5                    Single leg stance right/left: *Can perform, but fair              Deep squat:  Very difficulty with poor flexibility    Ham 90/90:   RIGHT LE: 40   LEFT LE: 40    Special Tests:               JOSE=  + B              SLR (<60 degrees)= Negative              SLUMP=  Negative                                  SI Joint Tests: + [place], JOSE, Thigh Thrust, ASIS Distraction, Sacral Compression  Neurological Screen: t    RADIATING SYMPTOMS?: YES/NO--NO--Generalized pain B Hips. Functional Mobility:  Affecting participation in basic ADLs and functional tasks. Balance:        Fair   Body Structures Involved:  1. Bones  2. Joints  3. Muscles  4. Ligaments Body Functions Affected:  1. Sensory/Pain  2. Neuromusculoskeletal  3. Movement Related Activities and Participation Affected:  1. Mobility  2. Self Care   Number of elements that affect the Plan of Care: 4+: HIGH COMPLEXITY   CLINICAL PRESENTATION:   Presentation: Stable and uncomplicated: LOW COMPLEXITY   CLINICAL DECISION MAKING:   Outcome Measure:    Tool Used: Lower Extremity Functional Scale (LEFS)  Score:  Initial: 43/80 Most Recent: X/80 (Date: -- )   Interpretation of Score: 20 questions each scored on a 5 point scale with 0 representing \"extreme difficulty or unable to perform\" and 4 representing \"no difficulty\". The lower the score, the greater the functional disability. 80/80 represents no disability. Minimal detectable change is 9 points. Score 80 79-63 62-48 47-32 31-16 15-1 0   Modifier CH CI CJ CK CL CM CN     ? Mobility - Walking and Moving Around:     - CURRENT STATUS: CK - 40%-59% impaired, limited or restricted    - GOAL STATUS: CJ - 20%-39% impaired, limited or restricted    - D/C STATUS:  ---------------To be determined---------------      Medical Necessity:   · Skilled intervention continues to be required due to above deficits affecting participation in basic ADLs and overall functional tolerance. Reason for Services/Other Comments:  · Patient continues to require skilled intervention due to  above deficits affecting participation in basic ADLs and overall functional tolerance. Use of outcome tool(s) and clinical judgement create a POC that gives a: Clear prediction of patient's progress: LOW COMPLEXITY   TREATMENT:   (In addition to Assessment/Re-Assessment sessions the following treatments were rendered)  THERAPEUTIC EXERCISE: (10* minutes):  Exercises per grid below to improve mobility, strength and balance. Required minimal visual and verbal cues to promote proper body alignment and promote proper body posture. Progressed resistance, range and complexity of movement as indicated. Date:  *6/19/18 Date:   Date:     Activity/Exercise Parameters Parameters Parameters   LTR 10\"x10     IT Band 30\"x3     Hamstring Stretch 30\"x3     Prone hip extension      Prone hip flexor stretch      Prone [facility]                                                  [facility]    MANUAL THERAPY: (-minutes): Joint mobilization, Soft tissue mobilization and Manipulation was utilized and necessary because of the patient's restricted joint motion, painful spasm, restricted motion of soft tissue.    (Used abbreviations: MET - muscle energy technique; PNF - proprioceptive neuromuscular facilitation; NMR - neuromuscular re-education; AP - anterior to posterior; PA - posterior to anterior)        Treatment/Session Assessment:  Raya Reynolds verbalized understanding of role of PT and POC. · Pain/ Symptoms: Initial:   2/10 Post Session:  2/10 ·   Compliance with Program/Exercises: Will assess as treatment progresses. · Recommendations/Intent for next treatment session: \"Next visit will focus on advancements to more challenging activities\".     Future Appointments  Date Time Provider Fernanda Resendiz   6/20/2018 8:00 AM Abimael Morin DPT [facility]   6/27/2018 4:15 PM Abimael Morin DPT [facility]       Total Treatment Duration:  PT Patient Time In/Time Out  Time In: 1600  Time Out: Benjamin [address] Tor[address] Moreland DPT

## 2018-06-20 ENCOUNTER — HOSPITAL ENCOUNTER (OUTPATIENT)
Dept: PHYSICAL THERAPY | Age: 58
Discharge: HOME | End: 2018-06-20
Attending: INTERNAL MEDICINE
Payer: COMMERCIAL

## 2018-06-20 PROCEDURE — 97110 THERAPEUTIC EXERCISES: CPT

## 2018-06-20 PROCEDURE — 97140 MANUAL THERAPY 1/> REGIONS: CPT

## 2018-06-20 NOTE — PROGRESS NOTES
Kg Alicea  : 1960      Payor: Carolina Bains / Plan: [place] [facility] / Product Type: PPO /  [address] [facility], [address], [facility], [address] [place] [address]  [phone]   [phone]       OUTPATIENT PHYSICAL THERAPY:Daily Note, Treatment Day: 2nd and AM 2018    ICD-10: Treatment Diagnosis:    Low back pain (M54.5)        Bilateral leg pain [M79.604, M79.605]  Difficulty walking [R26.2]      Precautions/Allergies:   Lamisil [terbinafine]   Fall Risk Score: 0 (? 5 = High Risk)  MD Orders: Eval and Treat  MEDICAL/REFERRING DIAGNOSIS:  Bilateral leg pain   DATE OF ONSET: Chronic  REFERRING PHYSICIAN: Celestino Corona MD  RETURN PHYSICIAN APPOINTMENT: TBD by Kg Alicea      INITIAL ASSESSMENT:  Ms. gK Alicea has attended 1 physical therapy session including initial evaluation. Kg Alicea presents with significant tightness to B hip internal rotators, and hip flexors. hamstrings and global LE (R > L). This is most likely due to her sitting at work and constantly in hip flexion/lower cross syndrome. Unable to perform JOSE L LE and requires much assist to get into position R LE. Strength additionally decreased as well as cardiopulmonary endurance. She voices that she has sat for majority of her work life and does not stretch regularly at home. Ambulation 30 minutes on treadmill is not painful. Pain exacerbates with prolonged sitting (>1 hour). Anterior rotation of R innominate present. Kg Alicea will benefit from skilled PT (medically necessary) to address above deficits affecting participation in basic ADLs and overall functional tolerance.   Manual techniques (stretching, joint mobilizations, soft tissue mobilization/myofascial release), postural exercises/education, therapeutic techniques/activities, and HEP will be performed as appropriate addressing Barby Rea's current condition. PROBLEM LIST (Impacting functional limitations):  1. Decreased Strength  2. Decreased ADL/Functional Activities  3. Decreased Transfer Abilities  4. Decreased Ambulation Ability/Technique  5. Decreased Balance  6. Increased Pain  7. Decreased Activity Tolerance  8. Increased Fatigue  9. Increased Shortness of Breath  10. Decreased Flexibility/Joint Mobility  11. Decreased Bartholomew with Home Exercise Program INTERVENTIONS PLANNED:  1. Balance Exercise  2. Bed Mobility  3. Cold  4. Cryotherapy  5. Electrical Stimulation  6. Family Education  7. Gait Training  8. Heat  9. Home Exercise Program (HEP)  10. Manual Therapy  11. Neuromuscular Re-education/Strengthening  12. Range of Motion (ROM)  13. Therapeutic Activites  14. Therapeutic Exercise/Strengthening  15. Transfer Training   TREATMENT PLAN:  Effective Dates: 6.19.18 TO 9/17/2018 (90 days). Frequency/Duration: 2-3 times a week for 90 Days  GOALS: (Goals have been discussed and agreed upon with patient.)  Short Term Goals 4 weeks   1. Antonia Delatorre will be independent with HEP  2. Antonia Delatorre will participate in LE stretching program to increase flexibility  3. Antonia Delatorre will participate in core stabilization exercises to help with stabilization during ADLs  4. Antonia Delatorre will participate in LE strengthening program with weights as appropriate to help with gait and elevations  5. Antonia Delatorre will participate in static and dynamic balance activities to decrease the risk for falls  6. Antonia Delatorre will tolerate manual therapy/joint mobilizations/soft tissue to increase ROM and decrease pain  7. Antonia Delatorre will be able to cross her legs with minimal difficulty and no pain. [address] will be able to get in and out of the car with minimal to no difficulty. 9. Antonia Delatorre pelvis will stay level with regards to innominate position. Long Term Goals 8 weeks   1.  Antonia Delatorre will demonstrate a 8 point improvement on the Oswestry to show improvement in function  2. Evelyn Conn will report 0/10 pain at rest and during ADLs  3. Evelyn Conn will demonstrate 5/5 LE strength on manual muscle testing  4. Evelyn Conn will be able to perform SLS >5 seconds bilaterally to help with gait and improve balance              HISTORY:   History of Present Injury/Illness (Reason for Referral): Evelyn Conn cannot cross her legs anymore and she has pain in her legs. She has increased stiffness. She talked with MD and explained she cannot cross her legs. She gets pain with crossing legs. She's been seeing Olegario Corley at [facility] to address soft tissue and flexibility. Evelyn Conn is from [place] and traveled a couple weekends ago. She had a PT treatment before she left and it was not as hard for her to sit in car as usual.  The pain is not localized and dull. She has been sitting for many years with her occupation. Denies hx of knee pain or back pain. Pain is mainly to lateral hips. PMHx reveals:    was trying to work out at [employer] but has been having more issues with walking  Has been having issues of leg and hip pain bilateral for 2 yrs  Her mobility has become restricted to the point she is unable to cross her legs and one leg is higher  She has difficulty with squats and pain with prolonged sitting  Immobility  In her glutes, psoas. Adductors and hamstring and quads     Sent her for initial evaluation at [facility] and working on decrease in her pain and working on soft tissue      Now would like to pursue more PT thru [facility] as needs strengthening and conditioning as well        -Present symptoms/complaints (on day of evaluation)  Pain Scale:  · Current: 3/10  · Best: 3/10  · Worst:  7-8/10      Past Medical History/Comorbidities:   Ms. John Escamilla  has a past medical history of Abnormal finding on EKG; Chicken pox; Depression; Headache;  Insomnia, transient (12/13/12); Measles; Mumps; Thyroid disease; and Uterine prolapse (12/13/12). Ms. Kory Jasmine  has a past surgical history that includes hx hysteroscopy with endometrial ablation (2002); hx appendectomy (age 12); hx lap cholecystectomy (~2013); hx splenectomy; and hx heent. Social History/Living Environment:     Sofi Pena works front office @ [employer]         Social History     Social History    Marital status:      Spouse name: N/A    Number of children: N/A    Years of education: N/A     Occupational History    Not on file. Social History Main Topics    Smoking status: Former Smoker     Packs/day: 0.25     Years: 10.00     Quit date: 2001    Smokeless tobacco: Never Used    Alcohol use Yes      Comment: 1-2 month    Drug use: No    Sexual activity: Yes     Partners: Male     Birth control/ protection: Surgical      Comment: vasectomy     Other Topics Concern    Not on file     Social History Narrative     Prior Level of Function/Work/Activity:  Independent. Active Ambulatory Problems     Diagnosis Date Noted    Insomnia 11/17/2015    Perimenopausal 11/17/2015    Goiter 11/17/2015    Rosacea 11/17/2015    Postoperative hypothyroidism 03/30/2016    Hypercholesterolemia 03/30/2016    Prediabetes 03/27/2017    Multinodular goiter 02/01/2018    Muscle ache of extremity 04/30/2018    Essential hypertension 06/06/2018     Resolved Ambulatory Problems     Diagnosis Date Noted    No Resolved Ambulatory Problems     Past Medical History:   Diagnosis Date    Abnormal finding on EKG     Chicken pox     Depression     Headache     Insomnia, transient 12/13/12    Measles     Mumps     Thyroid disease     Uterine prolapse 12/13/12     Note: Patient denies any increase of symptoms with cough, sneeze or valsalva. Patient denies any saddle paresthesia or bowel/bladder deficits. Personal Factors:          Sex:  female        Age:  65 y.o.   Current Medications:    Current Outpatient Prescriptions:     clonazePAM (KLONOPIN) 1 mg tablet, Take one tablet every evening., Disp: 30 Tab, Rfl: 5    ibuprofen (MOTRIN) 800 mg tablet, Take 1 Tab by mouth every eight (8) hours. , Disp: 30 Tab, Rfl: 0    losartan (COZAAR) 50 mg tablet, TAKE 1 TABLET DAILY FOR HYPERTENSION, Disp: 90 Tab, Rfl: 3    CINNAMON BARK-CHROMIUM PICOLIN PO, Take  by mouth., Disp: , Rfl:     LUTEIN PO, Take  by mouth., Disp: , Rfl:     MULTIVITAMIN WITH MINERALS (HAIR,SKIN AND NAILS PO), Take  by mouth., Disp: , Rfl:     L. ACID/L. CASEI/B.BIF/B.ARLYN/FOS (PROBIOTIC BLEND PO), Take  by mouth., Disp: , Rfl:     ASCORBATE CALCIUM (VITAMIN C PO), Take 1 Tab by mouth daily. Hold for surgery- last dose 2/6/17, Disp: , Rfl:     ZINC ACETATE PO, Take 1 Tab by mouth daily. Hold for surgery- last dose 2/6/17, Disp: , Rfl:     vitamin e 400 unit tab, Take 1 Tab by mouth daily. Hold for surgery- last dose 2/6/17, Disp: , Rfl:     Biotin 2,500 mcg cap, Take 1 Tab by mouth daily. Hold for surgery- last dose 2/6/17, Disp: , Rfl:     Calcium-Cholecalciferol, D3, (CALCIUM 600 WITH VITAMIN D3) 600 mg(1,500mg) -400 unit cap, Take 1 Tab by mouth daily. Hold for surgery- last dose 2/6/17, Disp: , Rfl:     omega-3 fatty acids-vitamin e (FISH OIL) 1,000 mg cap, Take 1 Cap by mouth daily. Hold for surgery- last dose 2/6/17, Disp: , Rfl:      Date Last Reviewed:  6/20/2018   EXAMINATION:   Observation/Orthostatic Postural Assessment:          Tightness B hip flexors.    Palpation:            ROM:            AROM/PROM         Joint: Eval Date: 6/19/18  Re-Assess Date:  Re-Assess Date:    Active ROM RIGHT LEFT RIGHT LEFT RIGHT LEFT   Knee Extension [facility]       Knee Flexion 130  DEG       Hip Flexion 40 deg  40 deg       Hip Abduction 10 DEG 20 DEG       Lumbar Flexion         Lumbar Extension         Lumbar Side-bending         Lumbar Rotation           Strength:     Eval Date:  6/19/18  Re-Assess Date:  Re-Assess Date:      RIGHT LEFT RIGHT LEFT RIGHT LEFT   Knee Flexion  5/5 5/5       Knee Extension (L3, L4) 4/5 4/5       Hip Flexion (L1, L2) 5/5 5/5       Hip Abduction (L5, S1) 4-/5 4-/5       Ankle Dorsiflexion  5/5 5/5       Great Toe Extension (L5) 5/5 5/5                    Single leg stance right/left: *Can perform, but fair              Deep squat:  Very difficulty with poor flexibility    Ham 90/90:   RIGHT LE: 40   LEFT LE: 40    Special Tests:               JOSE=  + B              SLR (<60 degrees)= Negative              SLUMP=  Negative                                  SI Joint Tests: + [place], JOSE, Thigh Thrust, ASIS Distraction, Sacral Compression  Neurological Screen: t    RADIATING SYMPTOMS?: YES/NO--NO--Generalized pain B Hips. Functional Mobility:  Affecting participation in basic ADLs and functional tasks. Balance:        Fair   Outcome Measure: Tool Used: Lower Extremity Functional Scale (LEFS)  Score:  Initial: 43/80 Most Recent: X/80 (Date: -- )   Interpretation of Score: 20 questions each scored on a 5 point scale with 0 representing \"extreme difficulty or unable to perform\" and 4 representing \"no difficulty\". The lower the score, the greater the functional disability. 80/80 represents no disability. Minimal detectable change is 9 points. Score 80 79-63 62-48 47-32 31-16 15-1 0   Modifier CH CI CJ CK CL CM CN     ? Mobility - Walking and Moving Around:     - CURRENT STATUS: CK - 40%-59% impaired, limited or restricted    - GOAL STATUS: CJ - 20%-39% impaired, limited or restricted    - D/C STATUS:  ---------------To be determined---------------      Medical Necessity:   · Skilled intervention continues to be required due to above deficits affecting participation in basic ADLs and overall functional tolerance.   Reason for Services/Other Comments:  · Patient continues to require skilled intervention due to  above deficits affecting participation in basic ADLs and overall functional tolerance. TREATMENT:   (In addition to Assessment/Re-Assessment sessions the following treatments were rendered)  THERAPEUTIC EXERCISE: (15 minutes):  Exercises per grid below to improve mobility, strength and balance. Required minimal visual and verbal cues to promote proper body alignment and promote proper body posture. Progressed resistance, range and complexity of movement as indicated. Date:  *6/19/18 Date\:6/620/18   Date:     Activity/Exercise Parameters Parameters Parameters   LTR 10\"x10     IT Band 30\"x3     Hamstring Stretch 30\"x3     Prone hip extension      Prone hip flexor stretch      Prone HSC  10x                                                [facility] Portal    MANUAL THERAPY: (25 minutes): Joint mobilization, Soft tissue mobilization and Manipulation was utilized and necessary because of the patient's restricted joint motion, painful spasm, restricted motion of soft tissue. (Used abbreviations: MET - muscle energy technique; PNF - proprioceptive neuromuscular facilitation; NMR - neuromuscular re-education; AP - anterior to posterior; PA - posterior to anterior)      MANUAL stretching B HS, piriformis, hip flexor. STM laterql hip. Grade IV mobs lateral hip prone       Treatment/Session Assessment:  Javier Avila verbalized understanding of role of PT and POC. Tightness with prone HSC to hip flexor. Significant tightness throughout LE.   MET corrected anterior rotation R innominate. · Pain/ Symptoms: Initial:   2/10 Post Session:  2/10 ·   Compliance with Program/Exercises: Will assess as treatment progresses. · Recommendations/Intent for next treatment session: \"Next visit will focus on advancements to more challenging activities\".     Future Appointments  Date Time Provider Fernanda Resendiz   6/27/2018 4:15 PM Christina Siegel DPT [facility]   6/29/2018 1:45 PM [facility]   7/3/2018 8:00 AM Christina Siegel DPT [facility] Total Treatment Duration: 36 '  PT Patient Time In/Time Out  Time In: 0800  Time Out: Gomez Whaley 4 Toi Bassett DPT

## 2018-06-27 ENCOUNTER — HOSPITAL ENCOUNTER (OUTPATIENT)
Dept: PHYSICAL THERAPY | Age: 58
Discharge: HOME | End: 2018-06-27
Attending: INTERNAL MEDICINE
Payer: COMMERCIAL

## 2018-06-27 PROCEDURE — 97140 MANUAL THERAPY 1/> REGIONS: CPT

## 2018-06-27 PROCEDURE — 97110 THERAPEUTIC EXERCISES: CPT

## 2018-06-27 NOTE — PROGRESS NOTES
Raúl Mendoza  : 1960      Payor: Micki Aguilar / Plan: [place] [facility] / Product Type: PPO /  [address] [facility], [address], [place], [id number] [place] [address] Road  [phone]   [phone]       OUTPATIENT PHYSICAL THERAPY:Daily Note, Treatment Day: 3rd and AM 2018    ICD-10: Treatment Diagnosis:    Low back pain (M54.5)        Bilateral leg pain [M79.604, M79.605]  Difficulty walking [R26.2]      Precautions/Allergies:   Lamisil [terbinafine]   Fall Risk Score: 0 (? 5 = High Risk)  MD Orders: Eval and Treat  MEDICAL/REFERRING DIAGNOSIS:  Bilateral leg pain   DATE OF ONSET: Chronic  REFERRING PHYSICIAN: Kelli Avendaño MD  RETURN PHYSICIAN APPOINTMENT: TBD by Raúl Mendoza      INITIAL ASSESSMENT:  Ms. Raúl Mendoza has attended 1 physical therapy session including initial evaluation. Raúl Mendoza presents with significant tightness to B hip internal rotators, and hip flexors. hamstrings and global LE (R > L). This is most likely due to her sitting at work and constantly in hip flexion/lower cross syndrome. Unable to perform JOSE L LE and requires much assist to get into position R LE. Strength additionally decreased as well as cardiopulmonary endurance. She voices that she has sat for majority of her work life and does not stretch regularly at home. Ambulation 30 minutes on treadmill is not painful. Pain exacerbates with prolonged sitting (>1 hour). Anterior rotation of R innominate present. Raúl Mendoza will benefit from skilled PT (medically necessary) to address above deficits affecting participation in basic ADLs and overall functional tolerance.   Manual techniques (stretching, joint mobilizations, soft tissue mobilization/myofascial release), postural exercises/education, therapeutic techniques/activities, and HEP will be performed as appropriate addressing Marcus Lanes Clements's current condition. PROBLEM LIST (Impacting functional limitations):  1. Decreased Strength  2. Decreased ADL/Functional Activities  3. Decreased Transfer Abilities  4. Decreased Ambulation Ability/Technique  5. Decreased Balance  6. Increased Pain  7. Decreased Activity Tolerance  8. Increased Fatigue  9. Increased Shortness of Breath  10. Decreased Flexibility/Joint Mobility  11. Decreased [place] with Home Exercise Program INTERVENTIONS PLANNED:  1. Balance Exercise  2. Bed Mobility  3. Cold  4. Cryotherapy  5. Electrical Stimulation  6. Family Education  7. Gait Training  8. Heat  9. Home Exercise Program (HEP)  10. Manual Therapy  11. Neuromuscular Re-education/Strengthening  12. Range of Motion (ROM)  13. Therapeutic Activites  14. Therapeutic Exercise/Strengthening  15. Transfer Training   TREATMENT PLAN:  Effective Dates: 6.19.18 TO 9/17/2018 (90 days). Frequency/Duration: 2-3 times a week for 90 Days  GOALS: (Goals have been discussed and agreed upon with patient.)  Short Term Goals 4 weeks   1. Richardson Closs will be independent with HEP  2. Richardson Closs will participate in LE stretching program to increase flexibility  3. Richardson Closs will participate in core stabilization exercises to help with stabilization during ADLs  4. Richardson Closs will participate in LE strengthening program with weights as appropriate to help with gait and elevations  5. Richardson Closs will participate in static and dynamic balance activities to decrease the risk for falls  6. Richardson Closs will tolerate manual therapy/joint mobilizations/soft tissue to increase ROM and decrease pain  7. Richardson Closs will be able to cross her legs with minimal difficulty and no pain. [address] will be able to get in and out of the car with minimal to no difficulty. 9. Richardson Closs pelvis will stay level with regards to innominate position. Long Term Goals 8 weeks   1.  Richardson Closs will demonstrate a 8 point improvement on the Oswestry to show improvement in function  2. Alec Montalvo will report 0/10 pain at rest and during ADLs  3. Alec Montalvo will demonstrate 5/5 LE strength on manual muscle testing  4. Alec Montalvo will be able to perform SLS >5 seconds bilaterally to help with gait and improve balance              HISTORY:   History of Present Injury/Illness (Reason for Referral): Alec Montalvo cannot cross her legs anymore and she has pain in her legs. She has increased stiffness. She talked with MD and explained she cannot cross her legs. She gets pain with crossing legs. She's been seeing Vidhi at [facility] to address soft tissue and flexibility. Alec Montalvo is from [place] and traveled a couple weekends ago. She had a PT treatment before she left and it was not as hard for her to sit in car as usual.  The pain is not localized and dull. She has been sitting for many years with her occupation. Denies hx of knee pain or back pain. Pain is mainly to lateral hips. PMHx reveals:    was trying to work out at [employer] but has been having more issues with walking  Has been having issues of leg and hip pain bilateral for 2 yrs  Her mobility has become restricted to the point she is unable to cross her legs and one leg is higher  She has difficulty with squats and pain with prolonged sitting  Immobility  In her glutes, psoas. Adductors and hamstring and quads     Sent her for initial evaluation at [facility] and working on decrease in her pain and working on soft tissue      Now would like to pursue more PT thru [facility] as needs strengthening and conditioning as well        -Present symptoms/complaints (on day of evaluation)  Pain Scale:  · Current: 3/10  · Best: 3/10  · Worst:  7-8/10      Past Medical History/Comorbidities:   Ms. Rosa Walker  has a past medical history of Abnormal finding on EKG; Chicken pox; Depression; Headache;  Insomnia, transient (12/13/12); Measles; Mumps; Thyroid disease; and Uterine prolapse (12/13/12). Ms. Sam Gee  has a past surgical history that includes hx hysteroscopy with endometrial ablation (2002); hx appendectomy (age 12); hx lap cholecystectomy (~2013); hx splenectomy; and hx heent. Social History/Living Environment:     [employer] front office @ [facility]         Social History     Social History    Marital status:      Spouse name: N/A    Number of children: N/A    Years of education: N/A     Occupational History    Not on file. Social History Main Topics    Smoking status: Former Smoker     Packs/day: 0.25     Years: 10.00     Quit date: 2001    Smokeless tobacco: Never Used    Alcohol use Yes      Comment: 1-2 month    Drug use: No    Sexual activity: Yes     Partners: Male     Birth control/ protection: Surgical      Comment: vasectomy     Other Topics Concern    Not on file     Social History Narrative     Prior Level of Function/Work/Activity:  Independent. Active Ambulatory Problems     Diagnosis Date Noted    Insomnia 11/17/2015    Perimenopausal 11/17/2015    Goiter 11/17/2015    Rosacea 11/17/2015    Postoperative hypothyroidism 03/30/2016    Hypercholesterolemia 03/30/2016    Prediabetes 03/27/2017    Multinodular goiter 02/01/2018    Muscle ache of extremity 04/30/2018    Essential hypertension 06/06/2018     Resolved Ambulatory Problems     Diagnosis Date Noted    No Resolved Ambulatory Problems     Past Medical History:   Diagnosis Date    Abnormal finding on EKG     Chicken pox     Depression     Headache     Insomnia, transient 12/13/12    Measles     Mumps     Thyroid disease     Uterine prolapse 12/13/12     Note: Patient denies any increase of symptoms with cough, sneeze or valsalva. Patient denies any saddle paresthesia or bowel/bladder deficits. Personal Factors:          Sex:  female        Age:  65 y.o.   Current Medications:    Current Outpatient Prescriptions:     clonazePAM (KLONOPIN) 1 mg tablet, Take one tablet every evening., Disp: 30 Tab, Rfl: 5    ibuprofen (MOTRIN) 800 mg tablet, Take 1 Tab by mouth every eight (8) hours. , Disp: 30 Tab, Rfl: 0    losartan (COZAAR) 50 mg tablet, TAKE 1 TABLET DAILY FOR HYPERTENSION, Disp: 90 Tab, Rfl: 3    CINNAMON BARK-CHROMIUM PICOLIN PO, Take  by mouth., Disp: , Rfl:     LUTEIN PO, Take  by mouth., Disp: , Rfl:     MULTIVITAMIN WITH MINERALS (HAIR,SKIN AND NAILS PO), Take  by mouth., Disp: , Rfl:     L. ACID/L. CASEI/B.BIF/B.ARLYN/FOS (PROBIOTIC BLEND PO), Take  by mouth., Disp: , Rfl:     ASCORBATE CALCIUM (VITAMIN C PO), Take 1 Tab by mouth daily. Hold for surgery- last dose 2/6/17, Disp: , Rfl:     ZINC ACETATE PO, Take 1 Tab by mouth daily. Hold for surgery- last dose 2/6/17, Disp: , Rfl:     vitamin e 400 unit tab, Take 1 Tab by mouth daily. Hold for surgery- last dose 2/6/17, Disp: , Rfl:     Biotin 2,500 mcg cap, Take 1 Tab by mouth daily. Hold for surgery- last dose 2/6/17, Disp: , Rfl:     Calcium-Cholecalciferol, D3, (CALCIUM 600 WITH VITAMIN D3) 600 mg(1,500mg) -400 unit cap, Take 1 Tab by mouth daily. Hold for surgery- last dose 2/6/17, Disp: , Rfl:     omega-3 fatty acids-vitamin e (FISH OIL) 1,000 mg cap, Take 1 Cap by mouth daily. Hold for surgery- last dose 2/6/17, Disp: , Rfl:      Date Last Reviewed:  6/27/2018   EXAMINATION:   Observation/Orthostatic Postural Assessment:          Tightness B hip flexors.    Palpation:            ROM:            AROM/PROM         Joint: Eval Date: 6/19/18  Re-Assess Date:  Re-Assess Date:    Active ROM RIGHT LEFT RIGHT LEFT RIGHT LEFT   Knee Extension [facility]       Knee Flexion 130  DEG       Hip Flexion 40 deg  40 deg       Hip Abduction 10 DEG 20 DEG       Lumbar Flexion         Lumbar Extension         Lumbar Side-bending         Lumbar Rotation           Strength:     Eval Date:  6/19/18  Re-Assess Date:  Re-Assess Date:      RIGHT LEFT RIGHT LEFT RIGHT LEFT   Knee Flexion  5/5 5/5       Knee Extension (L3, L4) 4/5 4/5       Hip Flexion (L1, L2) 5/5 5/5       Hip Abduction (L5, S1) 4-/5 4-/5       Ankle Dorsiflexion  5/5 5/5       Great Toe Extension (L5) 5/5 5/5                    Single leg stance right/left: *Can perform, but fair              Deep squat:  Very difficulty with poor flexibility    Ham 90/90:   RIGHT LE: 40   LEFT LE: 40    Special Tests:               JOSE=  + B              SLR (<60 degrees)= Negative              SLUMP=  Negative                                  SI Joint Tests: + [place], JOSE, Thigh Thrust, ASIS Distraction, Sacral Compression  Neurological Screen: t    RADIATING SYMPTOMS?: YES/NO--NO--Generalized pain B Hips. Functional Mobility:  Affecting participation in basic ADLs and functional tasks. Balance:        Fair   Outcome Measure: Tool Used: Lower Extremity Functional Scale (LEFS)  Score:  Initial: 43/80 Most Recent: X/80 (Date: -- )   Interpretation of Score: 20 questions each scored on a 5 point scale with 0 representing \"extreme difficulty or unable to perform\" and 4 representing \"no difficulty\". The lower the score, the greater the functional disability. 80/80 represents no disability. Minimal detectable change is 9 points. Score 80 79-63 62-48 47-32 31-16 15-1 0   Modifier CH CI CJ CK CL CM CN     ? Mobility - Walking and Moving Around:     - CURRENT STATUS: CK - 40%-59% impaired, limited or restricted    - GOAL STATUS: CJ - 20%-39% impaired, limited or restricted    - D/C STATUS:  ---------------To be determined---------------      Medical Necessity:   · Skilled intervention continues to be required due to above deficits affecting participation in basic ADLs and overall functional tolerance.   Reason for Services/Other Comments:  · Patient continues to require skilled intervention due to  above deficits affecting participation in basic ADLs and overall functional tolerance. TREATMENT:   (In addition to Assessment/Re-Assessment sessions the following treatments were rendered)  THERAPEUTIC EXERCISE: (15 minutes):  Exercises per grid below to improve mobility, strength and balance. Required minimal visual and verbal cues to promote proper body alignment and promote proper body posture. Progressed resistance, range and complexity of movement as indicated. Date:  *6/19/18 Date 6/20/18   Date:  6/27/18     Activity/Exercise Parameters Parameters Parameters   LTR 10\"x10     IT Band 30\"x3     Hamstring Stretch 30\"x3     Prone hip extension  10x    Prone hip flexor stretch      Prone HSC  10x    Prone Press Up  10x10\"                                          Med[employer] Portal    MANUAL THERAPY: (25 minutes): Joint mobilization, Soft tissue mobilization and Manipulation was utilized and necessary because of the patient's restricted joint motion, painful spasm, restricted motion of soft tissue. (Used abbreviations: MET - muscle energy technique; PNF - proprioceptive neuromuscular facilitation; NMR - neuromuscular re-education; AP - anterior to posterior; PA - posterior to anterior)      MANUAL stretching B HS, piriformis, hip flexor. STM laterql hip. Grade IV mobs posterior hip prone. MET anterior rotation R innominate. Manual lumbar traction. Treatment/Session Assessment:  Jen Cagle verbalized understanding of role of PT and POC. Tightness with prone HSC to hip flexor. Significant tightness throughout LE.   MET corrected anterior rotation R innominate. · Pain/ Symptoms: Initial:   2/10 Post Session:  2/10 ·   Compliance with Program/Exercises: Will assess as treatment progresses. · Recommendations/Intent for next treatment session: \"Next visit will focus on advancements to more challenging activities\".     Future Appointments  Date Time Provider Fernanda Resendiz   6/29/2018 1:45 PM Adebayo Bautista DPT [facility]   7/3/2018 8:00 AM Lynnann Vivien, DPT [facility]   7/9/2018 4:15 PM Lynnann Vivien, DPT [facility]   7/11/2018 4:15 PM Lynnann Vivien, DPT [facility]   7/16/2018 4:15 PM Lynnann Vivien, DPT [facility]   7/18/2018 4:15 PM Lynnann Vivien, DPT [facility]   7/24/2018 3:15 PM Lynnann Vivien, DPT [facility]   7/25/2018 4:15 PM Lynnann Vivien, DPT [facility]   8/2/2018 4:15 PM Lynnann Vivien, DPT [facility]   8/3/2018 1:45 PM Lynnann Vivien, DPT [facility]   8/7/2018 4:15 PM Lynnann Vivien, DPT [facility]   8/9/2018 4:15 PM Lynnann Vivien, DPT [facility]   8/13/2018 4:15 PM Lynnann Vivien, DPT [facility]       Total Treatment Duration: 36 '  PT Patient Time In/Time Out  Time In: 1610  Time Out: [facility] Cboy Alonzo DPT

## 2018-06-29 ENCOUNTER — HOSPITAL ENCOUNTER (OUTPATIENT)
Dept: PHYSICAL THERAPY | Age: 58
Discharge: HOME | End: 2018-06-29
Attending: INTERNAL MEDICINE
Payer: COMMERCIAL

## 2018-06-29 PROCEDURE — 97140 MANUAL THERAPY 1/> REGIONS: CPT

## 2018-06-29 PROCEDURE — 97110 THERAPEUTIC EXERCISES: CPT

## 2018-06-29 NOTE — PROGRESS NOTES
Rudy Yu  : 1960      Payor: Pattie Medrano / Plan: [facility] / Product Type: PPO /  [address] at [place] [address], [facility], [address] [place] [address] Road  [phone]   [phone]       OUTPATIENT PHYSICAL THERAPY:Daily Note, Treatment Day:  and PM 2018    ICD-10: Treatment Diagnosis:    Low back pain (M54.5)        Bilateral leg pain [M79.604, M79.605]  Difficulty walking [R26.2]      Precautions/Allergies:   Lamisil [terbinafine]   Fall Risk Score: 0 (? 5 = High Risk)  MD Orders: Eval and Treat  MEDICAL/REFERRING DIAGNOSIS:  Bilateral leg pain   DATE OF ONSET: Chronic  REFERRING PHYSICIAN: Arlet Ragland MD  RETURN PHYSICIAN APPOINTMENT: TBD by Rudysruthi Yu      INITIAL ASSESSMENT:  Ms. Rudy Yu has attended 1 physical therapy session including initial evaluation. Rudy Hence presents with significant tightness to B hip internal rotators, and hip flexors. hamstrings and global LE (R > L). This is most likely due to her sitting at work and constantly in hip flexion/lower cross syndrome. Unable to perform JOSE L LE and requires much assist to get into position R LE. Strength additionally decreased as well as cardiopulmonary endurance. She voices that she has sat for majority of her work life and does not stretch regularly at home. Ambulation 30 minutes on treadmill is not painful. Pain exacerbates with prolonged sitting (>1 hour). Anterior rotation of R innominate present. Rudy Hence will benefit from skilled PT (medically necessary) to address above deficits affecting participation in basic ADLs and overall functional tolerance.   Manual techniques (stretching, joint mobilizations, soft tissue mobilization/myofascial release), postural exercises/education, therapeutic techniques/activities, and HEP will be performed as appropriate addressing Ale Rea's current condition. PROBLEM LIST (Impacting functional limitations):  1. Decreased Strength  2. Decreased ADL/Functional Activities  3. Decreased Transfer Abilities  4. Decreased Ambulation Ability/Technique  5. Decreased Balance  6. Increased Pain  7. Decreased Activity Tolerance  8. Increased Fatigue  9. Increased Shortness of Breath  10. Decreased Flexibility/Joint Mobility  11. Decreased [place] with Home Exercise Program INTERVENTIONS PLANNED:  1. Balance Exercise  2. Bed Mobility  3. Cold  4. Cryotherapy  5. Electrical Stimulation  6. Family Education  7. Gait Training  8. Heat  9. Home Exercise Program (HEP)  10. Manual Therapy  11. Neuromuscular Re-education/Strengthening  12. Range of Motion (ROM)  13. Therapeutic Activites  14. Therapeutic Exercise/Strengthening  15. Transfer Training   TREATMENT PLAN:  Effective Dates: 6.19.18 TO 9/17/2018 (90 days). Frequency/Duration: 2-3 times a week for 90 Days  GOALS: (Goals have been discussed and agreed upon with patient.)  Short Term Goals 4 weeks   1. Cha Zhu will be independent with HEP  2. Cha Zhu will participate in LE stretching program to increase flexibility  3. Cha Zhu will participate in core stabilization exercises to help with stabilization during ADLs  4. Cha Zhu will participate in LE strengthening program with weights as appropriate to help with gait and elevations  5. Cha Zhu will participate in static and dynamic balance activities to decrease the risk for falls  6. Cha Zhu will tolerate manual therapy/joint mobilizations/soft tissue to increase ROM and decrease pain  7. Cha Zhu will be able to cross her legs with minimal difficulty and no pain. [address] will be able to get in and out of the car with minimal to no difficulty. 9. Cha Zhu pelvis will stay level with regards to innominate position. Long Term Goals 8 weeks   1.  Cha Zhu will demonstrate a 8 point improvement on the Oswestry to show improvement in function  2. Jen Cagle will report 0/10 pain at rest and during ADLs  3. Jen Cagle will demonstrate 5/5 LE strength on manual muscle testing  4. Jen Cagle will be able to perform SLS >5 seconds bilaterally to help with gait and improve balance              HISTORY:   History of Present Injury/Illness (Reason for Referral): Jen Cagle cannot cross her legs anymore and she has pain in her legs. She has increased stiffness. She talked with MD and explained she cannot cross her legs. She gets pain with crossing legs. She's been seeing Francine Blizzard at [facility] to address soft tissue and flexibility. Jen Cagle is from [place] and traveled a couple weekends ago. She had a PT treatment before she left and it was not as hard for her to sit in car as usual.  The pain is not localized and dull. She has been sitting for many years with her occupation. Denies hx of knee pain or back pain. Pain is mainly to lateral hips. PMHx reveals:    was trying to work out at [employer] but has been having more issues with walking  Has been having issues of leg and hip pain bilateral for 2 yrs  Her mobility has become restricted to the point she is unable to cross her legs and one leg is higher  She has difficulty with squats and pain with prolonged sitting  Immobility  In her glutes, psoas. Adductors and hamstring and quads     Sent her for initial evaluation at [facility] and working on decrease in her pain and working on soft tissue      Now would like to pursue more PT thru [facility] as needs strengthening and conditioning as well        -Present symptoms/complaints (on day of evaluation)  Pain Scale:  · Current: 3/10  · Best: 3/10  · Worst:  7-8/10      Past Medical History/Comorbidities:   Ms. Kevin Quintero  has a past medical history of Abnormal finding on EKG; Chicken pox; Depression; Headache;  Insomnia, transient (12/13/12); Measles; Mumps; Thyroid disease; and Uterine prolapse (12/13/12). Ms. Job Radford  has a past surgical history that includes hx hysteroscopy with endometrial ablation (2002); hx appendectomy (age 12); hx lap cholecystectomy (~2013); hx splenectomy; and hx heent. Social History/Living Environment:     [employer] front office @ [employer]         Social History     Social History    Marital status:      Spouse name: N/A    Number of children: N/A    Years of education: N/A     Occupational History    Not on file. Social History Main Topics    Smoking status: Former Smoker     Packs/day: 0.25     Years: 10.00     Quit date: 2001    Smokeless tobacco: Never Used    Alcohol use Yes      Comment: 1-2 month    Drug use: No    Sexual activity: Yes     Partners: Male     Birth control/ protection: Surgical      Comment: vasectomy     Other Topics Concern    Not on file     Social History Narrative     Prior Level of Function/Work/Activity:  Independent. Active Ambulatory Problems     Diagnosis Date Noted    Insomnia 11/17/2015    Perimenopausal 11/17/2015    Goiter 11/17/2015    Rosacea 11/17/2015    Postoperative hypothyroidism 03/30/2016    Hypercholesterolemia 03/30/2016    Prediabetes 03/27/2017    Multinodular goiter 02/01/2018    Muscle ache of extremity 04/30/2018    Essential hypertension 06/06/2018     Resolved Ambulatory Problems     Diagnosis Date Noted    No Resolved Ambulatory Problems     Past Medical History:   Diagnosis Date    Abnormal finding on EKG     Chicken pox     Depression     Headache     Insomnia, transient 12/13/12    Measles     Mumps     Thyroid disease     Uterine prolapse 12/13/12     Note: Patient denies any increase of symptoms with cough, sneeze or valsalva. Patient denies any saddle paresthesia or bowel/bladder deficits. Personal Factors:          Sex:  female        Age:  65 y.o.   Current Medications:    Current Outpatient Prescriptions:     clonazePAM (KLONOPIN) 1 mg tablet, Take one tablet every evening., Disp: 30 Tab, Rfl: 5    ibuprofen (MOTRIN) 800 mg tablet, Take 1 Tab by mouth every eight (8) hours. , Disp: 30 Tab, Rfl: 0    losartan (COZAAR) 50 mg tablet, TAKE 1 TABLET DAILY FOR HYPERTENSION, Disp: 90 Tab, Rfl: 3    CINNAMON BARK-CHROMIUM PICOLIN PO, Take  by mouth., Disp: , Rfl:     LUTEIN PO, Take  by mouth., Disp: , Rfl:     MULTIVITAMIN WITH MINERALS (HAIR,SKIN AND NAILS PO), Take  by mouth., Disp: , Rfl:     L. ACID/L. CASEI/B.BIF/B.ARLYN/FOS (PROBIOTIC BLEND PO), Take  by mouth., Disp: , Rfl:     ASCORBATE CALCIUM (VITAMIN C PO), Take 1 Tab by mouth daily. Hold for surgery- last dose 2/6/17, Disp: , Rfl:     ZINC ACETATE PO, Take 1 Tab by mouth daily. Hold for surgery- last dose 2/6/17, Disp: , Rfl:     vitamin e 400 unit tab, Take 1 Tab by mouth daily. Hold for surgery- last dose 2/6/17, Disp: , Rfl:     Biotin 2,500 mcg cap, Take 1 Tab by mouth daily. Hold for surgery- last dose 2/6/17, Disp: , Rfl:     Calcium-Cholecalciferol, D3, (CALCIUM 600 WITH VITAMIN D3) 600 mg(1,500mg) -400 unit cap, Take 1 Tab by mouth daily. Hold for surgery- last dose 2/6/17, Disp: , Rfl:     omega-3 fatty acids-vitamin e (FISH OIL) 1,000 mg cap, Take 1 Cap by mouth daily. Hold for surgery- last dose 2/6/17, Disp: , Rfl:      Date Last Reviewed:  6/29/2018   EXAMINATION:   Observation/Orthostatic Postural Assessment:          Tightness B hip flexors.    Palpation:            ROM:            AROM/PROM         Joint: Eval Date: 6/19/18  Re-Assess Date:  Re-Assess Date:    Active ROM RIGHT LEFT RIGHT LEFT RIGHT LEFT   Knee Extension [facility]       Knee Flexion 130  DEG       Hip Flexion 40 deg  40 deg       Hip Abduction 10 DEG 20 DEG       Lumbar Flexion         Lumbar Extension         Lumbar Side-bending         Lumbar Rotation           Strength:     Eval Date:  6/19/18  Re-Assess Date:  Re-Assess Date:      RIGHT LEFT RIGHT LEFT RIGHT LEFT   Knee Flexion  5/5 5/5       Knee Extension (L3, L4) 4/5 4/5       Hip Flexion (L1, L2) 5/5 5/5       Hip Abduction (L5, S1) 4-/5 4-/5       Ankle Dorsiflexion  5/5 5/5       Great Toe Extension (L5) 5/5 5/5                    Single leg stance right/left: *Can perform, but fair              Deep squat:  Very difficulty with poor flexibility    Ham 90/90:   RIGHT LE: 40   LEFT LE: 40    Special Tests:               JOSE=  + B              SLR (<60 degrees)= Negative              SLUMP=  Negative                                  SI Joint Tests: + [place], JOSE, Thigh Thrust, ASIS Distraction, Sacral Compression  Neurological Screen: t    RADIATING SYMPTOMS?: YES/NO--NO--Generalized pain B Hips. Functional Mobility:  Affecting participation in basic ADLs and functional tasks. Balance:        Fair   Outcome Measure: Tool Used: Lower Extremity Functional Scale (LEFS)  Score:  Initial: 43/80 Most Recent: X/80 (Date: -- )   Interpretation of Score: 20 questions each scored on a 5 point scale with 0 representing \"extreme difficulty or unable to perform\" and 4 representing \"no difficulty\". The lower the score, the greater the functional disability. 80/80 represents no disability. Minimal detectable change is 9 points. Score 80 79-63 62-48 47-32 31-16 15-1 0   Modifier CH CI CJ CK CL CM CN     ? Mobility - Walking and Moving Around:     - CURRENT STATUS: CK - 40%-59% impaired, limited or restricted    - GOAL STATUS: CJ - 20%-39% impaired, limited or restricted    - D/C STATUS:  ---------------To be determined---------------      Medical Necessity:   · Skilled intervention continues to be required due to above deficits affecting participation in basic ADLs and overall functional tolerance.   Reason for Services/Other Comments:  · Patient continues to require skilled intervention due to  above deficits affecting participation in basic ADLs and overall functional tolerance. TREATMENT:   (In addition to Assessment/Re-Assessment sessions the following treatments were rendered)  THERAPEUTIC EXERCISE: (15 minutes):  Exercises per grid below to improve mobility, strength and balance. Required minimal visual and verbal cues to promote proper body alignment and promote proper body posture. Progressed resistance, range and complexity of movement as indicated. Date:  *6/19/18 Date\:6/620/18   Date: 6/29/18     Activity/Exercise Parameters Parameters Parameters   LTR 10\"x10     IT Band 30\"x3     Hamstring Stretch 30\"x3  30\"x3 manual    Prone hip extension   10x   Prone hip flexor stretch      Prone HSC  10x 10x                                               Med[employer] Portal    MANUAL THERAPY: (25 minutes): Joint mobilization, Soft tissue mobilization and Manipulation was utilized and necessary because of the patient's restricted joint motion, painful spasm, restricted motion of soft tissue. (Used abbreviations: MET - muscle energy technique; PNF - proprioceptive neuromuscular facilitation; NMR - neuromuscular re-education; AP - anterior to posterior; PA - posterior to anterior)      MANUAL stretching B HS, piriformis, hip flexor. STM laterAl hip. Grade IV mobs lateral hip prone . MET anterior rotation R innominate. Treatment/Session Assessment:  Rosendo Baumann verbalized understanding of role of PT and POC. Tightness with prone HSC to hip flexor. Significant tightness throughout LE.   MET corrected anterior rotation R innominate. Showing some improvement with stretching. Pain is still very hard for her to describe, but tightness still evident. · Pain/ Symptoms: Initial:   1/10 Post Session:  2/10 ·   Compliance with Program/Exercises: Will assess as treatment progresses. · Recommendations/Intent for next treatment session: \"Next visit will focus on advancements to more challenging activities\".     Future Appointments  Date Time Provider Department [place]   7/3/2018 8:00 AM Lenny Davis, DPT [facility]   7/9/2018 4:15 PM Lenny Davis, DPT [facility]   7/11/2018 4:15 PM Lenny Davis, DPT [facility]   7/16/2018 4:15 PM Lenny Davis, DPT [facility]   7/18/2018 4:15 PM Lenny Davis, DPT [facility]   7/24/2018 3:15 PM Lenny Davis, [facility]PT [facility]   7/25/2018 4:15 PM Lenny Davis, [facility]PT [facility]   8/2/2018 4:15 PM Lenny Davis, DPT [facility]   8/3/2018 1:45 PM Lenny Davis, DPT [facility]   8/7/2018 4:15 PM Lenny Davis, [facility]PT [facility] [place] [facility]   8/9/2018 4:15 PM Lenny Davis, DPT [facility]   8/13/2018 4:15 PM Lenny Davis, [facility]PT [facility]       Total Treatment Duration: 36 '  PT Patient Time In/Time Out  Time In: 1350  Time Out: Paul Cruz, INOCENCIOT

## 2018-07-02 NOTE — PROGRESS NOTES
Aidee Elliott  : 1960      Payor: Jenny Nuñez / Plan: [facility] / Product Type: PPO /  [address] at [address], [place], [id number] [place] [address] Road  [phone]   [phone]       OUTPATIENT PHYSICAL THERAPY:Daily Note, Treatment Day: 5th and PM 7/3/2018    ICD-10: Treatment Diagnosis:    Low back pain (M54.5)        Bilateral leg pain [M79.604, M79.605]  Difficulty walking [R26.2]      Precautions/Allergies:   Lamisil [terbinafine]   Fall Risk Score: 0 (? 5 = High Risk)  MD Orders: Eval and Treat  MEDICAL/REFERRING DIAGNOSIS:  Bilateral leg pain   DATE OF ONSET: Chronic  REFERRING PHYSICIAN: John Araiza MD  RETURN PHYSICIAN APPOINTMENT: TBD by Aideesony Elliott      INITIAL ASSESSMENT:  Ms. Aidee Elliott has attended 1 physical therapy session including initial evaluation. Aidee Elliott presents with significant tightness to B hip internal rotators, and hip flexors. hamstrings and global LE (R > L). This is most likely due to her sitting at work and constantly in hip flexion/lower cross syndrome. Unable to perform JOSE L LE and requires much assist to get into position R LE. Strength additionally decreased as well as cardiopulmonary endurance. She voices that she has sat for majority of her work life and does not stretch regularly at home. Ambulation 30 minutes on treadmill is not painful. Pain exacerbates with prolonged sitting (>1 hour). Anterior rotation of R innominate present. Aidee Elliott will benefit from skilled PT (medically necessary) to address above deficits affecting participation in basic ADLs and overall functional tolerance.   Manual techniques (stretching, joint mobilizations, soft tissue mobilization/myofascial release), postural exercises/education, therapeutic techniques/activities, and HEP will be performed as appropriate addressing Khushbu Rea's current condition. PROBLEM LIST (Impacting functional limitations):  1. Decreased Strength  2. Decreased ADL/Functional Activities  3. Decreased Transfer Abilities  4. Decreased Ambulation Ability/Technique  5. Decreased Balance  6. Increased Pain  7. Decreased Activity Tolerance  8. Increased Fatigue  9. Increased Shortness of Breath  10. Decreased Flexibility/Joint Mobility  11. Decreased [place] with Home Exercise Program INTERVENTIONS PLANNED:  1. Balance Exercise  2. Bed Mobility  3. Cold  4. Cryotherapy  5. Electrical Stimulation  6. Family Education  7. Gait Training  8. Heat  9. Home Exercise Program (HEP)  10. Manual Therapy  11. Neuromuscular Re-education/Strengthening  12. Range of Motion (ROM)  13. Therapeutic Activites  14. Therapeutic Exercise/Strengthening  15. Transfer Training   TREATMENT PLAN:  Effective Dates: 6.19.18 TO 9/17/2018 (90 days). Frequency/Duration: 2-3 times a week for 90 Days  GOALS: (Goals have been discussed and agreed upon with patient.)  Short Term Goals 4 weeks   1. Rosendo Baumann will be independent with HEP  2. Rosendo Baumann will participate in LE stretching program to increase flexibility  3. Rosendo Baumann will participate in core stabilization exercises to help with stabilization during ADLs  4. Rosendo Baumann will participate in LE strengthening program with weights as appropriate to help with gait and elevations  5. Rosendo Baumann will participate in static and dynamic balance activities to decrease the risk for falls  6. Rosendo Baumann will tolerate manual therapy/joint mobilizations/soft tissue to increase ROM and decrease pain  7. Rosendo Baumann will be able to cross her legs with minimal difficulty and no pain. [address] will be able to get in and out of the car with minimal to no difficulty. 9. Rosendo Baumann pelvis will stay level with regards to innominate position. Long Term Goals 8 weeks   1.  Rosendo Baumann will demonstrate a 8 point improvement on the Oswestry to show improvement in function  2. Clementina Hammer will report 0/10 pain at rest and during ADLs  3. Clementina Hammer will demonstrate 5/5 LE strength on manual muscle testing  4. Clementina Hammer will be able to perform SLS >5 seconds bilaterally to help with gait and improve balance              HISTORY:   History of Present Injury/Illness (Reason for Referral): Clementina Hammer cannot cross her legs anymore and she has pain in her legs. She has increased stiffness. She talked with MD and explained she cannot cross her legs. She gets pain with crossing legs. She's been seeing Susan Taylor at [facility] to address soft tissue and flexibility. Clementina Hammer is from [place] and traveled a couple weekends ago. She had a PT treatment before she left and it was not as hard for her to sit in car as usual.  The pain is not localized and dull. She has been sitting for many years with her occupation. Denies hx of knee pain or back pain. Pain is mainly to lateral hips. PMHx reveals:    was trying to work out at [employer] but has been having more issues with walking  Has been having issues of leg and hip pain bilateral for 2 yrs  Her mobility has become restricted to the point she is unable to cross her legs and one leg is higher  She has difficulty with squats and pain with prolonged sitting  Immobility  In her glutes, psoas. Adductors and hamstring and quads     Sent her for initial evaluation at [facility] and working on decrease in her pain and working on soft tissue      Now would like to pursue more PT thru [facility] as needs strengthening and conditioning as well        -Present symptoms/complaints (on day of evaluation)  Pain Scale:  · Current: 3/10  · Best: 3/10  · Worst:  7-8/10      Past Medical History/Comorbidities:   Ms. Jonah Cool  has a past medical history of Abnormal finding on EKG; Chicken pox; Depression; Headache;  Insomnia, transient (12/13/12); Measles; Mumps; Thyroid disease; and Uterine prolapse (12/13/12). Ms. Leeta Osler  has a past surgical history that includes hx hysteroscopy with endometrial ablation (2002); hx appendectomy (age 12); hx lap cholecystectomy (~2013); hx splenectomy; and hx heent. Social History/Living Environment:     Peter Spaulding works front office @ [employer]         Social History     Social History    Marital status:      Spouse name: N/A    Number of children: N/A    Years of education: N/A     Occupational History    Not on file. Social History Main Topics    Smoking status: Former Smoker     Packs/day: 0.25     Years: 10.00     Quit date: 2001    Smokeless tobacco: Never Used    Alcohol use Yes      Comment: 1-2 month    Drug use: No    Sexual activity: Yes     Partners: Male     Birth control/ protection: Surgical      Comment: vasectomy     Other Topics Concern    Not on file     Social History Narrative     Prior Level of Function/Work/Activity:  Independent. Active Ambulatory Problems     Diagnosis Date Noted    Insomnia 11/17/2015    Perimenopausal 11/17/2015    Goiter 11/17/2015    Rosacea 11/17/2015    Postoperative hypothyroidism 03/30/2016    Hypercholesterolemia 03/30/2016    Prediabetes 03/27/2017    Multinodular goiter 02/01/2018    Muscle ache of extremity 04/30/2018    Essential hypertension 06/06/2018     Resolved Ambulatory Problems     Diagnosis Date Noted    No Resolved Ambulatory Problems     Past Medical History:   Diagnosis Date    Abnormal finding on EKG     Chicken pox     Depression     Headache     Insomnia, transient 12/13/12    Measles     Mumps     Thyroid disease     Uterine prolapse 12/13/12     Note: Patient denies any increase of symptoms with cough, sneeze or valsalva. Patient denies any saddle paresthesia or bowel/bladder deficits. Personal Factors:          Sex:  female        Age:  65 y.o.   Current Medications:    Current Outpatient Prescriptions:     clonazePAM (KLONOPIN) 1 mg tablet, Take one tablet every evening., Disp: 30 Tab, Rfl: 5    ibuprofen (MOTRIN) 800 mg tablet, Take 1 Tab by mouth every eight (8) hours. , Disp: 30 Tab, Rfl: 0    losartan (COZAAR) 50 mg tablet, TAKE 1 TABLET DAILY FOR HYPERTENSION, Disp: 90 Tab, Rfl: 3    CINNAMON BARK-CHROMIUM PICOLIN PO, Take  by mouth., Disp: , Rfl:     LUTEIN PO, Take  by mouth., Disp: , Rfl:     MULTIVITAMIN WITH MINERALS (HAIR,SKIN AND NAILS PO), Take  by mouth., Disp: , Rfl:     L. ACID/L. CASEI/B.BIF/B.ARLYN/FOS (PROBIOTIC BLEND PO), Take  by mouth., Disp: , Rfl:     ASCORBATE CALCIUM (VITAMIN C PO), Take 1 Tab by mouth daily. Hold for surgery- last dose 2/6/17, Disp: , Rfl:     ZINC ACETATE PO, Take 1 Tab by mouth daily. Hold for surgery- last dose 2/6/17, Disp: , Rfl:     vitamin e 400 unit tab, Take 1 Tab by mouth daily. Hold for surgery- last dose 2/6/17, Disp: , Rfl:     Biotin 2,500 mcg cap, Take 1 Tab by mouth daily. Hold for surgery- last dose 2/6/17, Disp: , Rfl:     Calcium-Cholecalciferol, D3, (CALCIUM 600 WITH VITAMIN D3) 600 mg(1,500mg) -400 unit cap, Take 1 Tab by mouth daily. Hold for surgery- last dose 2/6/17, Disp: , Rfl:     omega-3 fatty acids-vitamin e (FISH OIL) 1,000 mg cap, Take 1 Cap by mouth daily. Hold for surgery- last dose 2/6/17, Disp: , Rfl:      Date Last Reviewed:  7/3/2018   EXAMINATION:   Observation/Orthostatic Postural Assessment:          Tightness B hip flexors.    Palpation:            ROM:            AROM/PROM         Joint: Eval Date: 6/19/18  Re-Assess Date:  Re-Assess Date:    Active ROM RIGHT LEFT RIGHT LEFT RIGHT LEFT   Knee Extension [facility]       Knee Flexion 130  DEG       Hip Flexion 40 deg  40 deg       Hip Abduction 10 DEG 20 DEG       Lumbar Flexion         Lumbar Extension         Lumbar Side-bending         Lumbar Rotation           Strength:     Eval Date:  6/19/18  Re-Assess Date:  Re-Assess Date:      RIGHT LEFT RIGHT LEFT RIGHT LEFT   Knee Flexion  5/5 5/5       Knee Extension (L3, L4) 4/5 4/5       Hip Flexion (L1, L2) 5/5 5/5       Hip Abduction (L5, S1) 4-/5 4-/5       Ankle Dorsiflexion  5/5 5/5       Great Toe Extension (L5) 5/5 5/5                    Single leg stance right/left: *Can perform, but fair              Deep squat:  Very difficulty with poor flexibility    Ham 90/90:   RIGHT LE: 40   LEFT LE: 40    Special Tests:               JOSE=  + B              SLR (<60 degrees)= Negative              SLUMP=  Negative                                  SI Joint Tests: + [place], JOSE, Thigh Thrust, ASIS Distraction, Sacral Compression  Neurological Screen: t    RADIATING SYMPTOMS?: YES/NO--NO--Generalized pain B Hips. Functional Mobility:  Affecting participation in basic ADLs and functional tasks. Balance:        Fair   Outcome Measure: Tool Used: Lower Extremity Functional Scale (LEFS)  Score:  Initial: 43/80 Most Recent: X/80 (Date: -- )   Interpretation of Score: 20 questions each scored on a 5 point scale with 0 representing \"extreme difficulty or unable to perform\" and 4 representing \"no difficulty\". The lower the score, the greater the functional disability. 80/80 represents no disability. Minimal detectable change is 9 points. Score 80 79-63 62-48 47-32 31-16 15-1 0   Modifier CH CI CJ CK CL CM CN     ? Mobility - Walking and Moving Around:     - CURRENT STATUS: CK - 40%-59% impaired, limited or restricted    - GOAL STATUS: CJ - 20%-39% impaired, limited or restricted    - D/C STATUS:  ---------------To be determined---------------      Medical Necessity:   · Skilled intervention continues to be required due to above deficits affecting participation in basic ADLs and overall functional tolerance.   Reason for Services/Other Comments:  · Patient continues to require skilled intervention due to  above deficits affecting participation in basic ADLs and overall functional tolerance. TREATMENT:   (In addition to Assessment/Re-Assessment sessions the following treatments were rendered)  THERAPEUTIC EXERCISE: (10 minutes):  Exercises per grid below to improve mobility, strength and balance. Required minimal visual and verbal cues to promote proper body alignment and promote proper body posture. Progressed resistance, range and complexity of movement as indicated. Date:  *6/19/18 Date\:6/620/18   Date: 6/29/18   Date:  7/3/18    Activity/Exercise Parameters Parameters Parameters     LTR 10\"x10       IT Band 30\"x3       Hamstring Stretch 30\"x3  30\"x3 manual  30\"x3 manual     Prone hip extension   10x 10x    Prone hip flexor stretch        Prone HSC  10x 10x     Hooklying hip abduction    Purple 2x10                                                      MedBridge Portal    MANUAL THERAPY: (30 minutes): Joint mobilization, Soft tissue mobilization and Manipulation was utilized and necessary because of the patient's restricted joint motion, painful spasm, restricted motion of soft tissue. (Used abbreviations: MET - muscle energy technique; PNF - proprioceptive neuromuscular facilitation; NMR - neuromuscular re-education; AP - anterior to posterior; PA - posterior to anterior)      MANUAL stretching B HS, piriformis, hip flexor. STM lateral hip. Grade IV mobs lateral hip prone . MET anterior rotation R innominate. Treatment/Session Assessment:  Mel Messer verbalized understanding of role of PT and POC. Continuing to benefit from manual techniques as well as addition of there ex as appropriate. She is getting closer to crossing R leg over left. Added hookliying hip abduction to continue glute/ER strengthening. She has had fair compliance with HEP      · Pain/ Symptoms: Initial:   1/10 Post Session:  2/10 ·   Compliance with Program/Exercises: Will assess as treatment progresses. · Recommendations/Intent for next treatment session:  \"Next visit will focus on advancements to more challenging activities\".     Future Appointments  Date Time Provider Fernanda Resendiz   7/9/2018 4:15 PM Liza Plano, DPT [facility]   7/11/2018 4:15 PM Liza Plano, DPT [facility]   7/16/2018 4:15 PM Liza Plano, DPT [facility]   7/18/2018 4:15 PM Liza Plano, [facility]PT [facility]   7/24/2018 3:15 PM Liza Plano, [facility]   7/25/2018 4:15 PM Lzia Plano, DPT [facility]   8/2/2018 4:15 PM Liza Plano, [facility]PT [facility]   8/3/2018 1:45 PM Liza Plano, DPT [facility]   8/7/2018 4:15 PM Liza Plano, [facility]PT [facility]   8/9/2018 4:15 PM Liza Plano, [facility]PT [facility]   9/4/2018 4:15 PM Liza Plano, DPT [facility]   9/6/2018 4:15 PM Liza Plano, [facility]PT [facility]   9/11/2018 4:15 PM Liza Plano, [facility]PT [facility]   9/13/2018 4:15 PM Liza Plano, DPT [facility]   9/20/2018 4:15 PM Liza Plano, [facility]PT [facility]   9/21/2018 8:00 AM Liza Plano, DPT [facility]   9/27/2018 4:15 PM Liza Plano, [facility]PT [facility]       Total Treatment Duration: 36 '  PT Patient Time In/Time Out  Time In: 0800  Time Out: Gomez Glaser DPT

## 2018-07-03 ENCOUNTER — HOSPITAL ENCOUNTER (OUTPATIENT)
Dept: PHYSICAL THERAPY | Age: 58
Discharge: HOME | End: 2018-07-03
Attending: INTERNAL MEDICINE
Payer: COMMERCIAL

## 2018-07-03 PROCEDURE — 97140 MANUAL THERAPY 1/> REGIONS: CPT

## 2018-07-03 PROCEDURE — 97110 THERAPEUTIC EXERCISES: CPT

## 2018-07-09 ENCOUNTER — HOSPITAL ENCOUNTER (OUTPATIENT)
Dept: PHYSICAL THERAPY | Age: 58
Discharge: HOME | End: 2018-07-09
Attending: INTERNAL MEDICINE
Payer: COMMERCIAL

## 2018-07-09 PROCEDURE — 97110 THERAPEUTIC EXERCISES: CPT

## 2018-07-09 PROCEDURE — 97140 MANUAL THERAPY 1/> REGIONS: CPT

## 2018-07-09 NOTE — PROGRESS NOTES
Darryl Benito : 1960 Payor: Ayazluciana Laws / Plan: [facility] / Product Type: PPO /  [address] at [facility] [address] [place] [address], [address] [place] [address], [facility], [address] [place] [address] [phone]   [phone] OUTPATIENT PHYSICAL THERAPY:Daily Note, Treatment Day:  and PM 2018 ICD-10: Treatment Diagnosis: Low back pain (M54.5) Bilateral leg pain [M79.604, M79.605] Difficulty walking [R26.2] Precautions/Allergies:  
Lamisil [terbinafine] Fall Risk Score: 0 (? 5 = High Risk) MD Orders: Eval and Treat  MEDICAL/REFERRING DIAGNOSIS: 
Bilateral leg pain DATE OF ONSET: Chronic REFERRING PHYSICIAN: Karen Berkowitz MD 
RETURN PHYSICIAN APPOINTMENT: TBD by Darryl Benito INITIAL ASSESSMENT:  Ms. Darryl Benito has attended 1 physical therapy session including initial evaluation. Darryl Benito presents with significant tightness to B hip internal rotators, and hip flexors. hamstrings and global LE (R > L). This is most likely due to her sitting at work and constantly in hip flexion/lower cross syndrome. Unable to perform JOSE L LE and requires much assist to get into position R LE. Strength additionally decreased as well as cardiopulmonary endurance. She voices that she has sat for majority of her work life and does not stretch regularly at home. Ambulation 30 minutes on treadmill is not painful. Pain exacerbates with prolonged sitting (>1 hour). Anterior rotation of R innominate present. Darryl Benito will benefit from skilled PT (medically necessary) to address above deficits affecting participation in basic ADLs and overall functional tolerance.   Manual techniques (stretching, joint mobilizations, soft tissue mobilization/myofascial release), postural exercises/education, therapeutic techniques/activities, and HEP will be performed as appropriate addressing Lani Rea's current condition. PROBLEM LIST (Impacting functional limitations): 1. Decreased Strength 2. Decreased ADL/Functional Activities 3. Decreased Transfer Abilities 4. Decreased Ambulation Ability/Technique 5. Decreased Balance 6. Increased Pain 7. Decreased Activity Tolerance 8. Increased Fatigue 9. Increased Shortness of Breath 10. Decreased Flexibility/Joint Mobility 11. Decreased Jerauld with Home Exercise Program INTERVENTIONS PLANNED: 
1. Balance Exercise 2. Bed Mobility 3. Cold 4. Cryotherapy 5. Electrical Stimulation 6. Family Education 7. Gait Training 8. Heat 9. Home Exercise Program (HEP) 10. Manual Therapy 11. Neuromuscular Re-education/Strengthening 12. Range of Motion (ROM) 13. Therapeutic Activites 14. Therapeutic Exercise/Strengthening 15. Transfer Training TREATMENT PLAN: 
Effective Dates: 6.19.18 TO 9/17/2018 (90 days). Frequency/Duration: 2-3 times a week for 90 Days GOALS: (Goals have been discussed and agreed upon with patient.) Short Term Goals 4 weeks 1. Lata Martinez will be independent with HEP 2. Lata Martinez will participate in LE stretching program to increase flexibility 3. Lata Martinez will participate in core stabilization exercises to help with stabilization during ADLs 4. Lata Martinez will participate in LE strengthening program with weights as appropriate to help with gait and elevations 5. Lata Martinez will participate in static and dynamic balance activities to decrease the risk for falls 6. Lata Martinez will tolerate manual therapy/joint mobilizations/soft tissue to increase ROM and decrease pain 7. Lata Martinez will be able to cross her legs with minimal difficulty and no pain. [address] will be able to get in and out of the car with minimal to no difficulty. 9. Lata Martinez pelvis will stay level with regards to innominate position. Long Term Goals 8 weeks 1.  Lata Martinez will demonstrate a 8 point improvement on the Oswestry to show improvement in function 2. Sarah Wolf will report 0/10 pain at rest and during ADLs 3. Sarah Wolf will demonstrate 5/5 LE strength on manual muscle testing 4. Sarah Wolf will be able to perform SLS >5 seconds bilaterally to help with gait and improve balance HISTORY:  
History of Present Injury/Illness (Reason for Referral): Sarah Wolf cannot cross her legs anymore and she has pain in her legs. She has increased stiffness. She talked with MD and explained she cannot cross her legs. She gets pain with crossing legs. She's been seeing Nena Whittington at [facility] to address soft tissue and flexibility. Sarah Wolf is from [place] and traveled a couple weekends ago. She had a PT treatment before she left and it was not as hard for her to sit in car as usual.  The pain is not localized and dull. She has been sitting for many years with her occupation. Denies hx of knee pain or back pain. Pain is mainly to lateral hips. PMHx reveals: 
 
was trying to work out at [employer] but has been having more issues with walking Has been having issues of leg and hip pain bilateral for 2 yrs Her mobility has become restricted to the point she is unable to cross her legs and one leg is higher She has difficulty with squats and pain with prolonged sitting Immobility  In her glutes, psoas. Adductors and hamstring and quads 
  
Sent her for initial evaluation at [facility] and working on decrease in her pain and working on soft tissue  
  
Now would like to pursue more PT thru [facility] as needs strengthening and conditioning as well 
 
 
 
-Present symptoms/complaints (on day of evaluation) Pain Scale: · Current: 3/10 · Best: 3/10 · Worst:  7-8/10 Past Medical History/Comorbidities: Ms. Polina Ragland  has a past medical history of Abnormal finding on EKG; Chicken pox; Depression; Headache;  Insomnia, transient (12/13/12); Measles; Mumps; Thyroid disease; and Uterine prolapse (12/13/12). Ms. Cynthia Odom  has a past surgical history that includes hx hysteroscopy with endometrial ablation (2002); hx appendectomy (age 12); hx lap cholecystectomy (~2013); hx splenectomy; and hx heent. Social History/Living Environment:  
 
Cha Zhu works front office @ [address] Social History Social History  Marital status:  Spouse name: N/A  
 Number of children: N/A  
 Years of education: N/A Occupational History  Not on file. Social History Main Topics  Smoking status: Former Smoker Packs/day: 0.25 Years: 10.00 Quit date: 2001  Smokeless tobacco: Never Used  Alcohol use Yes Comment: 1-2 month  Drug use: No  
 Sexual activity: Yes  
  Partners: Male Birth control/ protection: Surgical  
   Comment: vasectomy Other Topics Concern  Not on file Social History Narrative Prior Level of Function/Work/Activity: 
Independent. Active Ambulatory Problems Diagnosis Date Noted  Insomnia 11/17/2015  Perimenopausal 11/17/2015  Goiter 11/17/2015  Rosacea 11/17/2015  Postoperative hypothyroidism 03/30/2016  Hypercholesterolemia 03/30/2016  Prediabetes 03/27/2017  Multinodular goiter 02/01/2018  Muscle ache of extremity 04/30/2018  Essential hypertension 06/06/2018 Resolved Ambulatory Problems Diagnosis Date Noted  No Resolved Ambulatory Problems Past Medical History:  
Diagnosis Date  Abnormal finding on EKG  Chicken pox  Depression  Headache  Insomnia, transient 12/13/12  Measles  Mumps  Thyroid disease  Uterine prolapse 12/13/12 Note: Patient denies any increase of symptoms with cough, sneeze or valsalva. Patient denies any saddle paresthesia or bowel/bladder deficits. Personal Factors:   
      Sex:  female Age:  65 y.o.  
Current Medications:   
Current Outpatient Prescriptions:  
  clonazePAM (KLONOPIN) 1 mg tablet, Take one tablet every evening., Disp: 30 Tab, Rfl: 5 
  ibuprofen (MOTRIN) 800 mg tablet, Take 1 Tab by mouth every eight (8) hours. , Disp: 30 Tab, Rfl: 0 
  losartan (COZAAR) 50 mg tablet, TAKE 1 TABLET DAILY FOR HYPERTENSION, Disp: 90 Tab, Rfl: 3 
  CINNAMON BARK-CHROMIUM PICOLIN PO, Take  by mouth., Disp: , Rfl:  
  LUTEIN PO, Take  by mouth., Disp: , Rfl:  
  MULTIVITAMIN WITH MINERALS (HAIR,SKIN AND NAILS PO), Take  by mouth., Disp: , Rfl:  
  L. ACID/L. CASEI/B.BIF/B.ARLYN/FOS (PROBIOTIC BLEND PO), Take  by mouth., Disp: , Rfl:   ASCORBATE CALCIUM (VITAMIN C PO), Take 1 Tab by mouth daily. Hold for surgery- last dose 2/6/17, Disp: , Rfl:  
  ZINC ACETATE PO, Take 1 Tab by mouth daily. Hold for surgery- last dose 2/6/17, Disp: , Rfl:  
  vitamin e 400 unit tab, Take 1 Tab by mouth daily. Hold for surgery- last dose 2/6/17, Disp: , Rfl:   Biotin 2,500 mcg cap, Take 1 Tab by mouth daily. Hold for surgery- last dose 2/6/17, Disp: , Rfl:  
  Calcium-Cholecalciferol, D3, (CALCIUM 600 WITH VITAMIN D3) 600 mg(1,500mg) -400 unit cap, Take 1 Tab by mouth daily. Hold for surgery- last dose 2/6/17, Disp: , Rfl:  
  omega-3 fatty acids-vitamin e (FISH OIL) 1,000 mg cap, Take 1 Cap by mouth daily. Hold for surgery- last dose 2/6/17, Disp: , Rfl:   
 
Date Last Reviewed:  7/9/2018 EXAMINATION:  
Observation/Orthostatic Postural Assessment:   
      Tightness B hip flexors. Palpation:   
       
ROM:   
       
AROM/PROM Joint: Eval Date: 6/19/18  Re-Assess Date:  Re-Assess Date: Active ROM RIGHT LEFT RIGHT LEFT RIGHT LEFT Knee Extension [facility] Knee Flexion 130  DEG Hip Flexion 40 deg  40 deg Hip Abduction 10 DEG 20 DEG Lumbar Flexion Lumbar Extension Lumbar Side-bending Lumbar Rotation Strength:   
 Eval Date:  6/19/18  Re-Assess Date:  Re-Assess Date:   
  RIGHT LEFT RIGHT LEFT RIGHT LEFT Knee Flexion  5/5 5/5 Knee Extension (L3, L4) 4/5 4/5 Hip Flexion (L1, L2) 5/5 5/5 Hip Abduction (L5, S1) 4-/5 4-/5 Ankle Dorsiflexion  5/5 5/5 Great Toe Extension (L5) 5/5 5/5 Single leg stance right/left: *Can perform, but fair 
            Deep squat:  Very difficulty with poor flexibility Ham 90/90: 
 RIGHT LE: 40 LEFT LE: 40 Special Tests: JOSE=  + B 
            SLR (<60 degrees)= Negative SLUMP=  Negative SI Joint Tests: + [place], JOSE, Thigh Thrust, ASIS Distraction, Sacral Compression Neurological Screen: t  
 RADIATING SYMPTOMS?: YES/NO--NO--Generalized pain B Hips. Functional Mobility:  Affecting participation in basic ADLs and functional tasks. Balance:   
    Fair Outcome Measure: Tool Used: Lower Extremity Functional Scale (LEFS) Score:  Initial: 43/80 Most Recent: X/80 (Date: -- ) Interpretation of Score: 20 questions each scored on a 5 point scale with 0 representing \"extreme difficulty or unable to perform\" and 4 representing \"no difficulty\". The lower the score, the greater the functional disability. 80/80 represents no disability. Minimal detectable change is 9 points. Score 80 79-63 62-48 47-32 31-16 15-1 0 Modifier CH CI CJ CK CL CM CN  
 
? Mobility - Walking and Moving Around:  
  - CURRENT STATUS: CK - 40%-59% impaired, limited or restricted  - GOAL STATUS: CJ - 20%-39% impaired, limited or restricted  - D/C STATUS:  ---------------To be determined--------------- Medical Necessity:  
· Skilled intervention continues to be required due to above deficits affecting participation in basic ADLs and overall functional tolerance.  
Reason for Services/Other Comments: 
· Patient continues to require skilled intervention due to  above deficits affecting participation in basic ADLs and overall functional tolerance. TREATMENT:  
(In addition to Assessment/Re-Assessment sessions the following treatments were rendered) THERAPEUTIC EXERCISE: (10 minutes):  Exercises per grid below to improve mobility, strength and balance. Required minimal visual and verbal cues to promote proper body alignment and promote proper body posture. Progressed resistance, range and complexity of movement as indicated. Date: 
*6/19/18 Date\:6/620/18 Date: 6/29/18 Date: 
7/3/18 Date: 
7/9/18 Activity/Exercise Parameters Parameters Parameters LTR 10\"x10 IT Band 30\"x3 Hamstring Stretch 30\"x3  30\"x3 manual  30\"x3 manual  30\"x3 manual  
Prone hip extension   10x 10x 2x10 Prone hip flexor stretch Prone HSC  10x 10x Hooklying hip abduction    Purple 2x10 Purple 2x10 Bridge on ball     2x10 on ball Standing hip extension and abduction     TB Blue 10x 4\" holds [facility] Kobi MONZON [employer] Portal 
 
MANUAL THERAPY: (30 minutes): Joint mobilization, Soft tissue mobilization and Manipulation was utilized and necessary because of the patient's restricted joint motion, painful spasm, restricted motion of soft tissue. (Used abbreviations: MET - muscle energy technique; PNF - proprioceptive neuromuscular facilitation; NMR - neuromuscular re-education; AP - anterior to posterior; PA - posterior to anterior) MANUAL stretching B HS, piriformis, hip flexor. STM lateral hip. Grade IV mobs lateral hip prone . MET anterior rotation R innominate. Manual traction supine. Treatment/Session Assessment:  Juana Brittle verbalized understanding of role of PT and POC. Slowly regaining movement and flexibility, but progress has been slow--which is understandable. Pain has dramatically improved since initial evaluation. · Pain/ Symptoms: Initial:   0-1/10 Post Session:  1/10 · Compliance with Program/Exercises:  Will assess as treatment progresses. · Recommendations/Intent for next treatment session: \"Next visit will focus on advancements to more challenging activities\". Future Appointments Date Time Provider Fernanda Resendiz 7/9/2018 4:15 PM Stein Breach, DPT [facility]  
7/11/2018 4:15 PM Stein Breach, DPT [facility]  
7/16/2018 4:15 PM Stein Breach, DPT [facility]  
7/18/2018 4:15 PM Stein Breach, DPT [facility]  
7/24/2018 3:15 PM Stein Breach, DPT [facility]  
7/25/2018 4:15 PM Stein Breach, [facility]PT [facility]  
8/2/2018 4:15 PM Stein Breach, DPT [facility]  
8/3/2018 1:45 PM Stein Breach, DPT [facility]  
8/7/2018 4:15 PM Stein Breach, [facility]PT [facility]  
8/9/2018 4:15 PM Stein Breach, [facility]PT [facility]  
9/4/2018 4:15 PM Stein Breach, DPT [facility]  
9/6/2018 4:15 PM Stein Breach, [facility]PT [facility]  
9/11/2018 4:15 PM Stein Breach, DPT [facility]  
9/13/2018 4:15 PM Stein Breach, DPT [facility]  
9/20/2018 4:15 PM Stein Breach, [facility]PT [facility]  
9/21/2018 8:00 AM Stein Breach, DPT [facility]  
9/27/2018 4:15 PM Stein Breach, DPT [facility] Total Treatment Duration: 36 ' PT Patient Time In/Time Out Time In: 4884 Time Out: 3404 Stein Simón, DPT

## 2018-07-11 ENCOUNTER — HOSPITAL ENCOUNTER (OUTPATIENT)
Dept: PHYSICAL THERAPY | Age: 58
Discharge: HOME | End: 2018-07-11
Attending: INTERNAL MEDICINE
Payer: COMMERCIAL

## 2018-07-11 PROCEDURE — 97140 MANUAL THERAPY 1/> REGIONS: CPT

## 2018-07-11 PROCEDURE — 97110 THERAPEUTIC EXERCISES: CPT

## 2018-07-11 NOTE — PROGRESS NOTES
Raúl Mendoza : 1960 Payor: Micki Aguilar / Plan: [place] [facility] / Product Type: PPO /  [address] at [facility] [address] [facility] [address], [facility], [address] [phone]   [phone] OUTPATIENT PHYSICAL THERAPY:Daily Note, Treatment Day:  and PM 2018 ICD-10: Treatment Diagnosis: Low back pain (M54.5) Bilateral leg pain [M79.604, M79.605] Difficulty walking [R26.2] Precautions/Allergies:  
Lamisil [terbinafine] Fall Risk Score: 0 (? 5 = High Risk) MD Orders: Eval and Treat  MEDICAL/REFERRING DIAGNOSIS: 
Bilateral leg pain DATE OF ONSET: Chronic REFERRING PHYSICIAN: Kelli Avendaño MD 
RETURN PHYSICIAN APPOINTMENT: TBD by Raúl Kelly INITIAL ASSESSMENT:  Ms. Raúl Mendoza has attended 1 physical therapy session including initial evaluation. Raúl Mendoza presents with significant tightness to B hip internal rotators, and hip flexors. hamstrings and global LE (R > L). This is most likely due to her sitting at work and constantly in hip flexion/lower cross syndrome. Unable to perform JOSE L LE and requires much assist to get into position R LE. Strength additionally decreased as well as cardiopulmonary endurance. She voices that she has sat for majority of her work life and does not stretch regularly at home. Ambulation 30 minutes on treadmill is not painful. Pain exacerbates with prolonged sitting (>1 hour). Anterior rotation of R innominate present. Raúl Mendoza will benefit from skilled PT (medically necessary) to address above deficits affecting participation in basic ADLs and overall functional tolerance.   Manual techniques (stretching, joint mobilizations, soft tissue mobilization/myofascial release), postural exercises/education, therapeutic techniques/activities, and HEP will be performed as appropriate addressing Marcus Lanes Clements's current condition. PROBLEM LIST (Impacting functional limitations): 1. Decreased Strength 2. Decreased ADL/Functional Activities 3. Decreased Transfer Abilities 4. Decreased Ambulation Ability/Technique 5. Decreased Balance 6. Increased Pain 7. Decreased Activity Tolerance 8. Increased Fatigue 9. Increased Shortness of Breath 10. Decreased Flexibility/Joint Mobility 11. Decreased [place] with Home Exercise Program INTERVENTIONS PLANNED: 
1. Balance Exercise 2. Bed Mobility 3. Cold 4. Cryotherapy 5. Electrical Stimulation 6. Family Education 7. Gait Training 8. Heat 9. Home Exercise Program (HEP) 10. Manual Therapy 11. Neuromuscular Re-education/Strengthening 12. Range of Motion (ROM) 13. Therapeutic Activites 14. Therapeutic Exercise/Strengthening 15. Transfer Training TREATMENT PLAN: 
Effective Dates: 6.19.18 TO 9/17/2018 (90 days). Frequency/Duration: 2-3 times a week for 90 Days GOALS: (Goals have been discussed and agreed upon with patient.) Short Term Goals 4 weeks 1. Victor Hugo Monet will be independent with HEP 2. Victor Hugo Monet will participate in LE stretching program to increase flexibility 3. Victor Hugo Monet will participate in core stabilization exercises to help with stabilization during ADLs 4. Victor Hugo Monet will participate in LE strengthening program with weights as appropriate to help with gait and elevations 5. Victor Hugo Monet will participate in static and dynamic balance activities to decrease the risk for falls 6. Victor Hugo Monet will tolerate manual therapy/joint mobilizations/soft tissue to increase ROM and decrease pain 7. Victor Hugo Monet will be able to cross her legs with minimal difficulty and no pain. [address] will be able to get in and out of the car with minimal to no difficulty. 9. Victor Hugo Monet pelvis will stay level with regards to innominate position. Long Term Goals 8 weeks 1.  Victor Hugo Monet will demonstrate a 8 point improvement on the Oswestry to show improvement in function 2. Richardson Closs will report 0/10 pain at rest and during ADLs 3. Richardson Closs will demonstrate 5/5 LE strength on manual muscle testing 4. Richardson Closs will be able to perform SLS >5 seconds bilaterally to help with gait and improve balance HISTORY:  
History of Present Injury/Illness (Reason for Referral): Richardson Closs cannot cross her legs anymore and she has pain in her legs. She has increased stiffness. She talked with MD and explained she cannot cross her legs. She gets pain with crossing legs. She's been seeing Vidhi at [facility] to address soft tissue and flexibility. Richardson Closs is from [place] and traveled a couple weekends ago. She had a PT treatment before she left and it was not as hard for her to sit in car as usual.  The pain is not localized and dull. She has been sitting for many years with her occupation. Denies hx of knee pain or back pain. Pain is mainly to lateral hips. PMHx reveals: 
 
was trying to work out at [employer] but has been having more issues with walking Has been having issues of leg and hip pain bilateral for 2 yrs Her mobility has become restricted to the point she is unable to cross her legs and one leg is higher She has difficulty with squats and pain with prolonged sitting Immobility  In her glutes, psoas. Adductors and hamstring and quads 
  
Sent her for initial evaluation at [facility] and working on decrease in her pain and working on soft tissue  
  
Now would like to pursue more PT thru [facility] as needs strengthening and conditioning as well 
 
 
 
-Present symptoms/complaints (on day of evaluation) Pain Scale: · Current: 3/10 · Best: 3/10 · Worst:  7-8/10 Past Medical History/Comorbidities: Ms. Lakia Miller  has a past medical history of Abnormal finding on EKG; Chicken pox; Depression; Headache;  Insomnia, transient (12/13/12); Measles; Mumps; Thyroid disease; and Uterine prolapse (12/13/12). Ms. Sally Briones  has a past surgical history that includes hx hysteroscopy with endometrial ablation (2002); hx appendectomy (age 12); hx lap cholecystectomy (~2013); hx splenectomy; and hx heent. Social History/Living Environment:  
 
Ragini Perla works front office @ [address] Social History Social History  Marital status:  Spouse name: N/A  
 Number of children: N/A  
 Years of education: N/A Occupational History  Not on file. Social History Main Topics  Smoking status: Former Smoker Packs/day: 0.25 Years: 10.00 Quit date: 2001  Smokeless tobacco: Never Used  Alcohol use Yes Comment: 1-2 month  Drug use: No  
 Sexual activity: Yes  
  Partners: Male Birth control/ protection: Surgical  
   Comment: vasectomy Other Topics Concern  Not on file Social History Narrative Prior Level of Function/Work/Activity: 
Independent. Active Ambulatory Problems Diagnosis Date Noted  Insomnia 11/17/2015  Perimenopausal 11/17/2015  Goiter 11/17/2015  Rosacea 11/17/2015  Postoperative hypothyroidism 03/30/2016  Hypercholesterolemia 03/30/2016  Prediabetes 03/27/2017  Multinodular goiter 02/01/2018  Muscle ache of extremity 04/30/2018  Essential hypertension 06/06/2018 Resolved Ambulatory Problems Diagnosis Date Noted  No Resolved Ambulatory Problems Past Medical History:  
Diagnosis Date  Abnormal finding on EKG  Chicken pox  Depression  Headache  Insomnia, transient 12/13/12  Measles  Mumps  Thyroid disease  Uterine prolapse 12/13/12 Note: Patient denies any increase of symptoms with cough, sneeze or valsalva. Patient denies any saddle paresthesia or bowel/bladder deficits. Personal Factors:   
      Sex:  female Age:  65 y.o.  
Current Medications:   
Current Outpatient Prescriptions:  
  clonazePAM (KLONOPIN) 1 mg tablet, Take one tablet every evening., Disp: 30 Tab, Rfl: 5 
  ibuprofen (MOTRIN) 800 mg tablet, Take 1 Tab by mouth every eight (8) hours. , Disp: 30 Tab, Rfl: 0 
  losartan (COZAAR) 50 mg tablet, TAKE 1 TABLET DAILY FOR HYPERTENSION, Disp: 90 Tab, Rfl: 3 
  CINNAMON BARK-CHROMIUM PICOLIN PO, Take  by mouth., Disp: , Rfl:  
  LUTEIN PO, Take  by mouth., Disp: , Rfl:  
  MULTIVITAMIN WITH MINERALS (HAIR,SKIN AND NAILS PO), Take  by mouth., Disp: , Rfl:  
  L. ACID/L. CASEI/B.BIF/B.ARLYN/FOS (PROBIOTIC BLEND PO), Take  by mouth., Disp: , Rfl:   ASCORBATE CALCIUM (VITAMIN C PO), Take 1 Tab by mouth daily. Hold for surgery- last dose 2/6/17, Disp: , Rfl:  
  ZINC ACETATE PO, Take 1 Tab by mouth daily. Hold for surgery- last dose 2/6/17, Disp: , Rfl:  
  vitamin e 400 unit tab, Take 1 Tab by mouth daily. Hold for surgery- last dose 2/6/17, Disp: , Rfl:   Biotin 2,500 mcg cap, Take 1 Tab by mouth daily. Hold for surgery- last dose 2/6/17, Disp: , Rfl:  
  Calcium-Cholecalciferol, D3, (CALCIUM 600 WITH VITAMIN D3) 600 mg(1,500mg) -400 unit cap, Take 1 Tab by mouth daily. Hold for surgery- last dose 2/6/17, Disp: , Rfl:  
  omega-3 fatty acids-vitamin e (FISH OIL) 1,000 mg cap, Take 1 Cap by mouth daily. Hold for surgery- last dose 2/6/17, Disp: , Rfl:   
 
Date Last Reviewed:  7/11/2018 EXAMINATION:  
Observation/Orthostatic Postural Assessment:   
      Tightness B hip flexors. Palpation:   
       
ROM:   
       
AROM/PROM Joint: Eval Date: 6/19/18  Re-Assess Date:  Re-Assess Date: Active ROM RIGHT LEFT RIGHT LEFT RIGHT LEFT Knee Extension [facility] Knee Flexion 130  DEG Hip Flexion 40 deg  40 deg Hip Abduction 10 DEG 20 DEG Lumbar Flexion Lumbar Extension Lumbar Side-bending Lumbar Rotation Strength:   
 Eval Date:  6/19/18  Re-Assess Date:  Re-Assess Date:   
  RIGHT LEFT RIGHT LEFT RIGHT LEFT Knee Flexion  5/5 5/5 Knee Extension (L3, L4) 4/5 4/5 Hip Flexion (L1, L2) 5/5 5/5 Hip Abduction (L5, S1) 4-/5 4-/5 Ankle Dorsiflexion  5/5 5/5 Great Toe Extension (L5) 5/5 5/5 Single leg stance right/left: *Can perform, but fair 
            Deep squat:  Very difficulty with poor flexibility Ham 90/90: 
 RIGHT LE: 40 LEFT LE: 40 Special Tests: JOSE=  + B 
            SLR (<60 degrees)= Negative SLUMP=  Negative SI Joint Tests: + [place], JOSE, Thigh Thrust, ASIS Distraction, Sacral Compression Neurological Screen: t  
 RADIATING SYMPTOMS?: YES/NO--NO--Generalized pain B Hips. Functional Mobility:  Affecting participation in basic ADLs and functional tasks. Balance:   
    Fair Outcome Measure: Tool Used: Lower Extremity Functional Scale (LEFS) Score:  Initial: 43/80 Most Recent: X/80 (Date: -- ) Interpretation of Score: 20 questions each scored on a 5 point scale with 0 representing \"extreme difficulty or unable to perform\" and 4 representing \"no difficulty\". The lower the score, the greater the functional disability. 80/80 represents no disability. Minimal detectable change is 9 points. Score 80 79-63 62-48 47-32 31-16 15-1 0 Modifier CH CI CJ CK CL CM CN  
 
? Mobility - Walking and Moving Around:  
  - CURRENT STATUS: CK - 40%-59% impaired, limited or restricted  - GOAL STATUS: CJ - 20%-39% impaired, limited or restricted  - D/C STATUS:  ---------------To be determined--------------- Medical Necessity:  
· Skilled intervention continues to be required due to above deficits affecting participation in basic ADLs and overall functional tolerance.  
Reason for Services/Other Comments: 
· Patient continues to require skilled intervention due to  above deficits affecting participation in basic ADLs and overall functional tolerance. TREATMENT:  
(In addition to Assessment/Re-Assessment sessions the following treatments were rendered) THERAPEUTIC EXERCISE: (30 minutes):  Exercises per grid below to improve mobility, strength and balance. Required minimal visual and verbal cues to promote proper body alignment and promote proper body posture. Progressed resistance, range and complexity of movement as indicated. Date: 
*6/19/18 Date\:6/620/18 Date: 6/29/18 Date: 
7/3/18 Date: 
7/9/18 Date: 
7/11/18 Activity/Exercise Parameters Parameters Parameters LTR 10\"x10 IT Band 30\"x3 Hamstring Stretch 30\"x3  30\"x3 manual  30\"x3 manual  30\"x3 manual 30\"X3 manual  
Prone hip extension   10x 10x 2x10 2x10 Prone hip flexor stretch Prone HSC  10x 10x Hooklying hip abduction    Purple 2x10 Purple 2x10 Purple 2x10, adduction 20x Bridge on ball     2x10 on ball 2x20 on ball Standing hip extension and abduction     TB Blue 10x 4\" holds EACH TB Blue 10x 5\" Cloteal Alejandro [place] [employer] Portal 
 
MANUAL THERAPY: (10 minutes): Joint mobilization, Soft tissue mobilization and Manipulation was utilized and necessary because of the patient's restricted joint motion, painful spasm, restricted motion of soft tissue. (Used abbreviations: MET - muscle energy technique; PNF - proprioceptive neuromuscular facilitation; NMR - neuromuscular re-education; AP - anterior to posterior; PA - posterior to anterior) MANUAL stretching B HS, piriformis, hip flexor. STM lateral hip. Grade IV mobs lateral hip prone . Giovanny Spies Manual traction supine. Treatment/Session Assessment:  Jasiel Monsalve verbalized understanding of role of PT and POC. I am unsure as to how much flexibility we will regain; however, I do think the strengthening is imperative. Went through all exercises and wrote how many to do at home and how often.    
 
· Pain/ Symptoms: Initial:   0-1/10 Post Session:  1/10 ·  
Compliance with Program/Exercises: Will assess as treatment progresses. · Recommendations/Intent for next treatment session: \"Next visit will focus on advancements to more challenging activities\". Future Appointments Date Time Provider Fernanda Resendiz 7/16/2018 4:15 PM Sherley Sinks, DPT [facility]  
7/18/2018 4:15 PM Sherley Sinks, DPT [facility]  
7/24/2018 3:15 PM Sherley Sinks, DPT [facility]  
7/25/2018 4:15 PM Sherley Sinks, [facility]PT [facility]  
8/2/2018 4:15 PM Sherley Sinks, DPT [facility]  
8/3/2018 1:45 PM Sherley Sinks, [facility]PT [facility]  
8/7/2018 4:15 PM Sherley Sinks, [facility]PT [facility]  
8/9/2018 4:15 PM Sherley Sinks, [facility]PT [facility]  
9/4/2018 4:15 PM Sherley Sinks, [facility]PT [facility]  
9/6/2018 4:15 PM Sherley Sinks, [facility]PT [facility]  
9/11/2018 4:15 PM Sherley Sinks, [facility]PT [facility]  
9/13/2018 4:15 PM Sherley Sinks, DPT [facility]  
9/20/2018 4:15 PM Sherley Sinks, DPT [facility]  
9/21/2018 8:00 AM Sherley Sinks, [facility] [place] [facility]  
9/27/2018 4:15 PM Sherley Sinks, DPT [facility] Total Treatment Duration: 36 ' PT Patient Time In/Time Out Time In: 0227 Time Out: [id number] Sherley Sinks, DPT

## 2018-07-16 ENCOUNTER — APPOINTMENT (OUTPATIENT)
Dept: PHYSICAL THERAPY | Age: 58
End: 2018-07-16
Attending: INTERNAL MEDICINE
Payer: COMMERCIAL

## 2018-07-18 ENCOUNTER — HOSPITAL ENCOUNTER (OUTPATIENT)
Dept: PHYSICAL THERAPY | Age: 58
Discharge: HOME | End: 2018-07-18
Attending: INTERNAL MEDICINE
Payer: COMMERCIAL

## 2018-07-18 PROCEDURE — 97110 THERAPEUTIC EXERCISES: CPT

## 2018-07-18 PROCEDURE — 97140 MANUAL THERAPY 1/> REGIONS: CPT

## 2018-07-18 NOTE — PROGRESS NOTES
Arcenio Marcelo : 1960 Payor: Shiela Miramontes / Plan: [place] [facility] / Product Type: PPO /  Denia Shyannes at [facility] [address] [facility], [place], operated by [facility], [address] [place] [address] Avenue, [facility], [id number] [place] [address] [phone]   [phone] OUTPATIENT PHYSICAL THERAPY:Daily Note, Treatment Day:  and PM 2018 ICD-10: Treatment Diagnosis: Low back pain (M54.5) Bilateral leg pain [M79.604, M79.605] Difficulty walking [R26.2] Precautions/Allergies:  
Lamisil [terbinafine] Fall Risk Score: 0 (? 5 = High Risk) MD Orders: Eval and Treat  MEDICAL/REFERRING DIAGNOSIS: 
Bilateral leg pain DATE OF ONSET: Chronic REFERRING PHYSICIAN: Mecca Huff MD 
RETURN PHYSICIAN APPOINTMENT: TBD by Arcenio Marcelo INITIAL ASSESSMENT:  Ms. Arcenio Marcelo has attended 1 physical therapy session including initial evaluation. Arcenio Marcelo presents with significant tightness to B hip internal rotators, and hip flexors. hamstrings and global LE (R > L). This is most likely due to her sitting at work and constantly in hip flexion/lower cross syndrome. Unable to perform JOSE L LE and requires much assist to get into position R LE. Strength additionally decreased as well as cardiopulmonary endurance. She voices that she has sat for majority of her work life and does not stretch regularly at home. Ambulation 30 minutes on treadmill is not painful. Pain exacerbates with prolonged sitting (>1 hour). Anterior rotation of R innominate present. Arcenio Marcelo will benefit from skilled PT (medically necessary) to address above deficits affecting participation in basic ADLs and overall functional tolerance.   Manual techniques (stretching, joint mobilizations, soft tissue mobilization/myofascial release), postural exercises/education, therapeutic techniques/activities, and HEP will be performed as appropriate addressing Patrick Rea's current condition. PROBLEM LIST (Impacting functional limitations): 1. Decreased Strength 2. Decreased ADL/Functional Activities 3. Decreased Transfer Abilities 4. Decreased Ambulation Ability/Technique 5. Decreased Balance 6. Increased Pain 7. Decreased Activity Tolerance 8. Increased Fatigue 9. Increased Shortness of Breath 10. Decreased Flexibility/Joint Mobility 11. Decreased [place] with Home Exercise Program INTERVENTIONS PLANNED: 
1. Balance Exercise 2. Bed Mobility 3. Cold 4. Cryotherapy 5. Electrical Stimulation 6. Family Education 7. Gait Training 8. Heat 9. Home Exercise Program (HEP) 10. Manual Therapy 11. Neuromuscular Re-education/Strengthening 12. Range of Motion (ROM) 13. Therapeutic Activites 14. Therapeutic Exercise/Strengthening 15. Transfer Training TREATMENT PLAN: 
Effective Dates: 6.19.18 TO 9/17/2018 (90 days). Frequency/Duration: 2-3 times a week for 90 Days GOALS: (Goals have been discussed and agreed upon with patient.) Short Term Goals 4 weeks 1. Gagandeep Gonzalez will be independent with HEP 2. Gagandeep Gonzalez will participate in LE stretching program to increase flexibility 3. Gagandeep Gonzalez will participate in core stabilization exercises to help with stabilization during ADLs 4. Gagandeep Gonzalez will participate in LE strengthening program with weights as appropriate to help with gait and elevations 5. Gagandeep Gonzalez will participate in static and dynamic balance activities to decrease the risk for falls 6. Gagandeep Gonzalez will tolerate manual therapy/joint mobilizations/soft tissue to increase ROM and decrease pain 7. Gagandeep Gonzalez will be able to cross her legs with minimal difficulty and no pain. [address] will be able to get in and out of the car with minimal to no difficulty. 9. Gagandeep Gonzalez pelvis will stay level with regards to innominate position. Long Term Goals 8 weeks 1.  Gagandeep Gonzalez will demonstrate a 8 point improvement on the Oswestry to show improvement in function 2. Lianne Jensen will report 0/10 pain at rest and during ADLs 3. Lianne Jensen will demonstrate 5/5 LE strength on manual muscle testing 4. Lianne Jensen will be able to perform SLS >5 seconds bilaterally to help with gait and improve balance HISTORY:  
History of Present Injury/Illness (Reason for Referral): Lianne Jensen cannot cross her legs anymore and she has pain in her legs. She has increased stiffness. She talked with MD and explained she cannot cross her legs. She gets pain with crossing legs. She's been seeing Tennis Ramp at [facility] to address soft tissue and flexibility. Lianne Jensen is from [place] and traveled a couple weekends ago. She had a PT treatment before she left and it was not as hard for her to sit in car as usual.  The pain is not localized and dull. She has been sitting for many years with her occupation. Denies hx of knee pain or back pain. Pain is mainly to lateral hips. PMHx reveals: 
 
was trying to work out at [employer] but has been having more issues with walking Has been having issues of leg and hip pain bilateral for 2 yrs Her mobility has become restricted to the point she is unable to cross her legs and one leg is higher She has difficulty with squats and pain with prolonged sitting Immobility  In her glutes, psoas. Adductors and hamstring and quads 
  
Sent her for initial evaluation at [facility] and working on decrease in her pain and working on soft tissue  
  
Now would like to pursue more PT thru [facility] as needs strengthening and conditioning as well 
 
 
 
-Present symptoms/complaints (on day of evaluation) Pain Scale: · Current: 3/10 · Best: 3/10 · Worst:  7-8/10 Past Medical History/Comorbidities: Ms. Leonela Mcfarlane  has a past medical history of Abnormal finding on EKG; Chicken pox; Depression; Headache;  Insomnia, transient (12/13/12); Measles; Mumps; Thyroid disease; and Uterine prolapse (12/13/12). Ms. Sahara Sahni  has a past surgical history that includes hx hysteroscopy with endometrial ablation (2002); hx appendectomy (age 12); hx lap cholecystectomy (~2013); hx splenectomy; and hx heent. Social History/Living Environment:  
 
Jordi Rodriguez works front office @ [employer] Social History Social History  Marital status:  Spouse name: N/A  
 Number of children: N/A  
 Years of education: N/A Occupational History  Not on file. Social History Main Topics  Smoking status: Former Smoker Packs/day: 0.25 Years: 10.00 Quit date: 2001  Smokeless tobacco: Never Used  Alcohol use Yes Comment: 1-2 month  Drug use: No  
 Sexual activity: Yes  
  Partners: Male Birth control/ protection: Surgical  
   Comment: vasectomy Other Topics Concern  Not on file Social History Narrative Prior Level of Function/Work/Activity: 
Independent. Active Ambulatory Problems Diagnosis Date Noted  Insomnia 11/17/2015  Perimenopausal 11/17/2015  Goiter 11/17/2015  Rosacea 11/17/2015  Postoperative hypothyroidism 03/30/2016  Hypercholesterolemia 03/30/2016  Prediabetes 03/27/2017  Multinodular goiter 02/01/2018  Muscle ache of extremity 04/30/2018  Essential hypertension 06/06/2018 Resolved Ambulatory Problems Diagnosis Date Noted  No Resolved Ambulatory Problems Past Medical History:  
Diagnosis Date  Abnormal finding on EKG  Chicken pox  Depression  Headache  Insomnia, transient 12/13/12  Measles  Mumps  Thyroid disease  Uterine prolapse 12/13/12 Note: Patient denies any increase of symptoms with cough, sneeze or valsalva. Patient denies any saddle paresthesia or bowel/bladder deficits. Personal Factors:   
      Sex:  female Age:  65 y.o.  
Current Medications:   
Current Outpatient Prescriptions:  
  ibuprofen (MOTRIN) 800 mg tablet, Take 1 Tab by mouth every eight (8) hours. , Disp: 30 Tab, Rfl: 1   clonazePAM (KLONOPIN) 1 mg tablet, Take one tablet every evening., Disp: 30 Tab, Rfl: 5 
  losartan (COZAAR) 50 mg tablet, TAKE 1 TABLET DAILY FOR HYPERTENSION, Disp: 90 Tab, Rfl: 3 
  CINNAMON BARK-CHROMIUM PICOLIN PO, Take  by mouth., Disp: , Rfl:  
  LUTEIN PO, Take  by mouth., Disp: , Rfl:  
  MULTIVITAMIN WITH MINERALS (HAIR,SKIN AND NAILS PO), Take  by mouth., Disp: , Rfl:  
  L. ACID/L. CASEI/B.BIF/B.ARLYN/FOS (PROBIOTIC BLEND PO), Take  by mouth., Disp: , Rfl:   ASCORBATE CALCIUM (VITAMIN C PO), Take 1 Tab by mouth daily. Hold for surgery- last dose 2/6/17, Disp: , Rfl:  
  ZINC ACETATE PO, Take 1 Tab by mouth daily. Hold for surgery- last dose 2/6/17, Disp: , Rfl:  
  vitamin e 400 unit tab, Take 1 Tab by mouth daily. Hold for surgery- last dose 2/6/17, Disp: , Rfl:   Biotin 2,500 mcg cap, Take 1 Tab by mouth daily. Hold for surgery- last dose 2/6/17, Disp: , Rfl:  
  Calcium-Cholecalciferol, D3, (CALCIUM 600 WITH VITAMIN D3) 600 mg(1,500mg) -400 unit cap, Take 1 Tab by mouth daily. Hold for surgery- last dose 2/6/17, Disp: , Rfl:  
  omega-3 fatty acids-vitamin e (FISH OIL) 1,000 mg cap, Take 1 Cap by mouth daily. Hold for surgery- last dose 2/6/17, Disp: , Rfl:   
 
Date Last Reviewed:  7/18/2018 EXAMINATION:  
Observation/Orthostatic Postural Assessment:   
      Tightness B hip flexors. Palpation:   
       
ROM:   
       
AROM/PROM Joint: Eval Date: 6/19/18  Re-Assess Date:  Re-Assess Date: Active ROM RIGHT LEFT RIGHT LEFT RIGHT LEFT Knee Extension [facility] Knee Flexion 130  DEG Hip Flexion 40 deg  40 deg Hip Abduction 10 DEG 20 DEG Lumbar Flexion Lumbar Extension Lumbar Side-bending Lumbar Rotation Strength:   
 Eval Date:  6/19/18  Re-Assess Date:  Re-Assess Date:   
  RIGHT LEFT RIGHT LEFT RIGHT LEFT Knee Flexion  5/5 5/5 Knee Extension (L3, L4) 4/5 4/5 Hip Flexion (L1, L2) 5/5 5/5 Hip Abduction (L5, S1) 4-/5 4-/5 Ankle Dorsiflexion  5/5 5/5 Great Toe Extension (L5) 5/5 5/5 Single leg stance right/left: *Can perform, but fair 
            Deep squat:  Very difficulty with poor flexibility Ham 90/90: 
 RIGHT LE: 40 LEFT LE: 40 Special Tests: JOSE=  + B 
            SLR (<60 degrees)= Negative SLUMP=  Negative SI Joint Tests: + [place], JOSE, Thigh Thrust, ASIS Distraction, Sacral Compression Neurological Screen: t  
 RADIATING SYMPTOMS?: YES/NO--NO--Generalized pain B Hips. Functional Mobility:  Affecting participation in basic ADLs and functional tasks. Balance:   
    Fair Outcome Measure: Tool Used: Lower Extremity Functional Scale (LEFS) Score:  Initial: 43/80 Most Recent: X/80 (Date: -- ) Interpretation of Score: 20 questions each scored on a 5 point scale with 0 representing \"extreme difficulty or unable to perform\" and 4 representing \"no difficulty\". The lower the score, the greater the functional disability. 80/80 represents no disability. Minimal detectable change is 9 points. Score 80 79-63 62-48 47-32 31-16 15-1 0 Modifier CH CI CJ CK CL CM CN  
 
? Mobility - Walking and Moving Around:  
  - CURRENT STATUS: CK - 40%-59% impaired, limited or restricted  - GOAL STATUS: CJ - 20%-39% impaired, limited or restricted  - D/C STATUS:  ---------------To be determined--------------- Medical Necessity:  
· Skilled intervention continues to be required due to above deficits affecting participation in basic ADLs and overall functional tolerance.  
Reason for Services/Other Comments: 
· Patient continues to require skilled intervention due to  above deficits affecting participation in basic ADLs and overall functional tolerance. TREATMENT:  
(In addition to Assessment/Re-Assessment sessions the following treatments were rendered) THERAPEUTIC EXERCISE: (33 minutes):  Exercises per grid below to improve mobility, strength and balance. Required minimal visual and verbal cues to promote proper body alignment and promote proper body posture. Progressed resistance, range and complexity of movement as indicated. Date: 
*6/19/18 Date\:6/620/18 Date: 6/29/18 Date: 
7/3/18 Date: 
7/9/18 Date: 
7/11/18 Date: 
7/18/18 Activity/Exercise Parameters Parameters Parameters LTR 10\"x10 IT Band 30\"x3 Hamstring Stretch 30\"x3  30\"x3 manual  30\"x3 manual  30\"x3 manual 30\"X3 manual 30\"X3 manual  
Prone hip extension   10x 10x 2x10 2x10 2x10 Prone hip flexor stretch Prone HSC  10x 10x Hooklying hip abduction    Purple 2x10 Purple 2x10 Purple 2x10, adduction 20x Bridge on ball     2x10 on ball 2x20 on ball Standing hip extension and abduction     TB Blue 10x 4\" holds EACH TB Blue 10x 5\" Chante Osier [place]  Fire hydrant 20x B  
RDL SL and Double leg        7# DL, 2# SL 10x e on 6\" box 10x at table height practicing technique Stool Scoot       4x walking Shuttle       50# R LE, L LE 2X10 MedBridge Portal 
 
MANUAL THERAPY: (10 minutes): Joint mobilization, Soft tissue mobilization and Manipulation was utilized and necessary because of the patient's restricted joint motion, painful spasm, restricted motion of soft tissue. (Used abbreviations: MET - muscle energy technique; PNF - proprioceptive neuromuscular facilitation; NMR - neuromuscular re-education; AP - anterior to posterior; PA - posterior to anterior) MANUAL stretching B HS, piriformis, hip flexor. STM lateral hip. Grade IV mobs lateral hip prone . Jamas Dubonnet Manual traction supine. As well as LE long axis distraction B LE. Treatment/Session Assessment:  Stephanie Marquez verbalized understanding of role of PT and Shailesh Garvey has not been stretching at home as she should. .  She tends to rotate L/S with hydrant exercise---cued to keep trunk level and not to \"spill the plate of peas\"  Limited L ER. Additionally shown SL glute press on shuttle -- encouraged her not to perform at home if knee pain develops. · Pain/ Symptoms: Initial:   0-1/10 Post Session:  1/10 · Compliance with Program/Exercises: Will assess as treatment progresses. · Recommendations/Intent for next treatment session: \"Next visit will focus on advancements to more challenging activities\". Future Appointments Date Time Provider Fernanda Resendiz 7/24/2018 3:15 PM Francee Seals, DPT [facility]  
7/25/2018 4:15 PM Francee Seals, DPT [facility]  
8/2/2018 4:15 PM Francee Seals, DPT [facility]  
8/3/2018 1:45 PM Francee Seals, DPT [facility]  
8/7/2018 4:15 PM Francee Seals, DPT [facility]  
8/9/2018 4:15 PM Francee Seals, DPT [facility]  
9/4/2018 4:15 PM Francee Seals, DPT [facility]  
9/6/2018 4:15 PM Francee Seals, DPT [facility]  
9/11/2018 4:15 PM Francee Seals, DPT [facility]  
9/13/2018 4:15 PM Francee Seals, DPT [facility]  
9/20/2018 4:15 PM Francee Seals, DPT [facility]  
9/21/2018 8:00 AM Francee Seals, DPT [facility]  
9/27/2018 4:15 PM Francee Seals, DPT [facility] Total Treatment Duration: 37 ' PT Patient Time In/Time Out Time In: 7980 Time Out: 3782 Francee Seals, DPT

## 2018-07-24 ENCOUNTER — HOSPITAL ENCOUNTER (OUTPATIENT)
Dept: PHYSICAL THERAPY | Age: 58
Discharge: HOME | End: 2018-07-24
Attending: INTERNAL MEDICINE
Payer: COMMERCIAL

## 2018-07-24 PROCEDURE — 97110 THERAPEUTIC EXERCISES: CPT

## 2018-07-24 PROCEDURE — 97140 MANUAL THERAPY 1/> REGIONS: CPT

## 2018-07-24 NOTE — PROGRESS NOTES
Lata Martinez : 1960 Payor: Daxa Best / Plan: [place] [facility] / Product Type: PPO /  [address] at [facility] [address] [facility], [address] [facility], [facility], [address] [place] [address] [phone]   [phone] OUTPATIENT PHYSICAL THERAPY:Daily Note 2018   Visit # 9 ICD-10: Treatment Diagnosis: Low back pain (M54.5) Bilateral leg pain [M79.604, M79.605] Difficulty walking [R26.2] Precautions/Allergies:  
Lamisil [terbinafine] Fall Risk Score: 0 (? 5 = High Risk) MD Orders: Eval and Treat  MEDICAL/REFERRING DIAGNOSIS: 
Bilateral leg pain DATE OF ONSET: Chronic REFERRING PHYSICIAN: Anupama Bell MD 
RETURN PHYSICIAN APPOINTMENT: TBD by Lata Martinez INITIAL ASSESSMENT:  Ms. Lata Martinez has attended 1 physical therapy session including initial evaluation. Lata Martinez presents with significant tightness to B hip internal rotators, and hip flexors. hamstrings and global LE (R > L). This is most likely due to her sitting at work and constantly in hip flexion/lower cross syndrome. Unable to perform JOSE L LE and requires much assist to get into position R LE. Strength additionally decreased as well as cardiopulmonary endurance. She voices that she has sat for majority of her work life and does not stretch regularly at home. Ambulation 30 minutes on treadmill is not painful. Pain exacerbates with prolonged sitting (>1 hour). Anterior rotation of R innominate present. Lata Martinez will benefit from skilled PT (medically necessary) to address above deficits affecting participation in basic ADLs and overall functional tolerance. Manual techniques (stretching, joint mobilizations, soft tissue mobilization/myofascial release), postural exercises/education, therapeutic techniques/activities, and HEP will be performed as appropriate addressing [facility] Rea's current condition. PROBLEM LIST (Impacting functional limitations): 1. Decreased Strength 2. Decreased ADL/Functional Activities 3. Decreased Transfer Abilities 4. Decreased Ambulation Ability/Technique 5. Decreased Balance 6. Increased Pain 7. Decreased Activity Tolerance 8. Increased Fatigue 9. Increased Shortness of Breath 10. Decreased Flexibility/Joint Mobility 11. Decreased [place] with Home Exercise Program INTERVENTIONS PLANNED: 
1. Balance Exercise 2. Bed Mobility 3. Cold 4. Cryotherapy 5. Electrical Stimulation 6. Family Education 7. Gait Training 8. Heat 9. Home Exercise Program (HEP) 10. Manual Therapy 11. Neuromuscular Re-education/Strengthening 12. Range of Motion (ROM) 13. Therapeutic Activites 14. Therapeutic Exercise/Strengthening 15. Transfer Training TREATMENT PLAN: 
Effective Dates: 6.19.18 TO 9/17/2018 (90 days). Frequency/Duration: 2-3 times a week for 90 Days GOALS: (Goals have been discussed and agreed upon with patient.) Short Term Goals 4 weeks 1. Colletta Ceo will be independent with HEP 2. Colletta Ceo will participate in LE stretching program to increase flexibility 3. Colletta Ceo will participate in core stabilization exercises to help with stabilization during ADLs 4. Colletta Ceo will participate in LE strengthening program with weights as appropriate to help with gait and elevations 5. Colletta Ceo will participate in static and dynamic balance activities to decrease the risk for falls 6. Colletta Ceo will tolerate manual therapy/joint mobilizations/soft tissue to increase ROM and decrease pain 7. Colletta Ceo will be able to cross her legs with minimal difficulty and no pain. [address] will be able to get in and out of the car with minimal to no difficulty. 9. Colletta Ceo pelvis will stay level with regards to innominate position. Long Term Goals 8 weeks 1.  Colletta Ceo will demonstrate a 8 point improvement on the Oswestry to show improvement in function 2. Luis Kasper will report 0/10 pain at rest and during ADLs 3. Luis Kasper will demonstrate 5/5 LE strength on manual muscle testing 4. Luis Kasper will be able to perform SLS >5 seconds bilaterally to help with gait and improve balance HISTORY:  
History of Present Injury/Illness (Reason for Referral): Luis Kasper cannot cross her legs anymore and she has pain in her legs. She has increased stiffness. She talked with MD and explained she cannot cross her legs. She gets pain with crossing legs. She's been seeing Swapna Shah at [facility] to address soft tissue and flexibility. Luis Kasper is from [place] and traveled a couple weekends ago. She had a PT treatment before she left and it was not as hard for her to sit in car as usual.  The pain is not localized and dull. She has been sitting for many years with her occupation. Denies hx of knee pain or back pain. Pain is mainly to lateral hips. PMHx reveals: 
 
was trying to work out at [employer] but has been having more issues with walking Has been having issues of leg and hip pain bilateral for 2 yrs Her mobility has become restricted to the point she is unable to cross her legs and one leg is higher She has difficulty with squats and pain with prolonged sitting Immobility  In her glutes, psoas. Adductors and hamstring and quads 
  
Sent her for initial evaluation at [facility] and working on decrease in her pain and working on soft tissue  
  
Now would like to pursue more PT thru [facility] as needs strengthening and conditioning as well 
 
 
 
-Present symptoms/complaints (on day of evaluation) Pain Scale: · Current: 3/10 · Best: 3/10 · Worst:  7-8/10 Past Medical History/Comorbidities: Ms. Marge Posey  has a past medical history of Abnormal finding on EKG; Chicken pox; Depression; Headache; Insomnia, transient (12/13/12);  Measles; Mumps; Thyroid disease; and Uterine prolapse (12/13/12). Ms. Angy Valdes  has a past surgical history that includes hx hysteroscopy with endometrial ablation (2002); hx appendectomy (age 12); hx lap cholecystectomy (~2013); hx splenectomy; and hx heent. Social History/Living Environment:  
 
Lata Martinez works front office @ [employer] Social History Social History  Marital status:  Spouse name: N/A  
 Number of children: N/A  
 Years of education: N/A Occupational History  Not on file. Social History Main Topics  Smoking status: Former Smoker Packs/day: 0.25 Years: 10.00 Quit date: 2001  Smokeless tobacco: Never Used  Alcohol use Yes Comment: 1-2 month  Drug use: No  
 Sexual activity: Yes  
  Partners: Male Birth control/ protection: Surgical  
   Comment: vasectomy Other Topics Concern  Not on file Social History Narrative Prior Level of Function/Work/Activity: 
Independent. Active Ambulatory Problems Diagnosis Date Noted  Insomnia 11/17/2015  Perimenopausal 11/17/2015  Goiter 11/17/2015  Rosacea 11/17/2015  Postoperative hypothyroidism 03/30/2016  Hypercholesterolemia 03/30/2016  Prediabetes 03/27/2017  Multinodular goiter 02/01/2018  Muscle ache of extremity 04/30/2018  Essential hypertension 06/06/2018 Resolved Ambulatory Problems Diagnosis Date Noted  No Resolved Ambulatory Problems Past Medical History:  
Diagnosis Date  Abnormal finding on EKG  Chicken pox  Depression  Headache  Insomnia, transient 12/13/12  Measles  Mumps  Thyroid disease  Uterine prolapse 12/13/12 Note: Patient denies any increase of symptoms with cough, sneeze or valsalva. Patient denies any saddle paresthesia or bowel/bladder deficits. Personal Factors:   
      Sex:  female Age:  65 y.o.  
Current Medications:   
Current Outpatient Prescriptions:  
  ibuprofen (MOTRIN) 800 mg tablet, Take 1 Tab by mouth every eight (8) hours. , Disp: 30 Tab, Rfl: 1   clonazePAM (KLONOPIN) 1 mg tablet, Take one tablet every evening., Disp: 30 Tab, Rfl: 5 
  losartan (COZAAR) 50 mg tablet, TAKE 1 TABLET DAILY FOR HYPERTENSION, Disp: 90 Tab, Rfl: 3 
  CINNAMON BARK-CHROMIUM PICOLIN PO, Take  by mouth., Disp: , Rfl:  
  LUTEIN PO, Take  by mouth., Disp: , Rfl:  
  MULTIVITAMIN WITH MINERALS (HAIR,SKIN AND NAILS PO), Take  by mouth., Disp: , Rfl:  
  L. ACID/L. CASEI/B.BIF/B.ARLYN/FOS (PROBIOTIC BLEND PO), Take  by mouth., Disp: , Rfl:   ASCORBATE CALCIUM (VITAMIN C PO), Take 1 Tab by mouth daily. Hold for surgery- last dose 2/6/17, Disp: , Rfl:  
  ZINC ACETATE PO, Take 1 Tab by mouth daily. Hold for surgery- last dose 2/6/17, Disp: , Rfl:  
  vitamin e 400 unit tab, Take 1 Tab by mouth daily. Hold for surgery- last dose 2/6/17, Disp: , Rfl:   Biotin 2,500 mcg cap, Take 1 Tab by mouth daily. Hold for surgery- last dose 2/6/17, Disp: , Rfl:  
  Calcium-Cholecalciferol, D3, (CALCIUM 600 WITH VITAMIN D3) 600 mg(1,500mg) -400 unit cap, Take 1 Tab by mouth daily. Hold for surgery- last dose 2/6/17, Disp: , Rfl:  
  omega-3 fatty acids-vitamin e (FISH OIL) 1,000 mg cap, Take 1 Cap by mouth daily. Hold for surgery- last dose 2/6/17, Disp: , Rfl:   
 
Date Last Reviewed:  7/24/2018 EXAMINATION:  
Observation/Orthostatic Postural Assessment:   
      Tightness B hip flexors. Palpation:   
       
ROM:   
       
AROM/PROM Joint: Eval Date: 6/19/18  Re-Assess Date:  Re-Assess Date: Active ROM RIGHT LEFT RIGHT LEFT RIGHT LEFT Knee Extension [facility] Knee Flexion 130  DEG Hip Flexion 40 deg  40 deg Hip Abduction 10 DEG 20 DEG Lumbar Flexion Lumbar Extension Lumbar Side-bending Lumbar Rotation Strength:   
 Eval Date:  6/19/18  Re-Assess Date:  Re-Assess Date:   
  RIGHT LEFT RIGHT LEFT RIGHT LEFT Knee Flexion  5/5 5/5 Knee Extension (L3, L4) 4/5 4/5 Hip Flexion (L1, L2) 5/5 5/5 Hip Abduction (L5, S1) 4-/5 4-/5 Ankle Dorsiflexion  5/5 5/5 Great Toe Extension (L5) 5/5 5/5 Single leg stance right/left: *Can perform, but fair 
            Deep squat:  Very difficulty with poor flexibility Ham 90/90: 
 RIGHT LE: 40 LEFT LE: 40 Special Tests: JOSE=  + B 
            SLR (<60 degrees)= Negative SLUMP=  Negative SI Joint Tests: + [place], JOSE, Thigh Thrust, ASIS Distraction, Sacral Compression Neurological Screen: t  
 RADIATING SYMPTOMS?: YES/NO--NO--Generalized pain B Hips. Functional Mobility:  Affecting participation in basic ADLs and functional tasks. Balance:   
    Fair Outcome Measure: Tool Used: Lower Extremity Functional Scale (LEFS) Score:  Initial: 43/80 Most Recent: X/80 (Date: -- ) Interpretation of Score: 20 questions each scored on a 5 point scale with 0 representing \"extreme difficulty or unable to perform\" and 4 representing \"no difficulty\". The lower the score, the greater the functional disability. 80/80 represents no disability. Minimal detectable change is 9 points. Score 80 79-63 62-48 47-32 31-16 15-1 0 Modifier CH CI CJ CK CL CM CN  
 
? Mobility - Walking and Moving Around:  
  - CURRENT STATUS: CK - 40%-59% impaired, limited or restricted  - GOAL STATUS: CJ - 20%-39% impaired, limited or restricted  - D/C STATUS:  ---------------To be determined--------------- Medical Necessity:  
· Skilled intervention continues to be required due to above deficits affecting participation in basic ADLs and overall functional tolerance. Reason for Services/Other Comments: 
· Patient continues to require skilled intervention due to  above deficits affecting participation in basic ADLs and overall functional tolerance. TREATMENT:  
(In addition to Assessment/Re-Assessment sessions the following treatments were rendered) THERAPEUTIC EXERCISE: (15 minutes):  Exercises per grid below to improve mobility, strength and balance. Required minimal visual and verbal cues to promote proper body alignment and promote proper body posture. Progressed resistance, range and complexity of movement as indicated. Date: 
*6/19/18 Date\:6/620/18 Date: 6/29/18 Date: 
7/3/18 Date: 
7/9/18 Date: 
7/11/18 Date: 
7/18/18 Date: 
7/24/18 Activity/Exercise Parameters Parameters Parameters LTR 10\"x10 IT Band 30\"x3 Hamstring Stretch 30\"x3  30\"x3 manual  30\"x3 manual  30\"x3 manual 30\"X3 manual 30\"X3 manual 30\"x3 manual   
Prone hip extension   10x 10x 2x10 2x10 2x10 2x10 Prone hip flexor stretch Prone HSC  10x 10x Hooklying hip abduction    Purple 2x10 Purple 2x10 Purple 2x10, adduction 20x Bridge on ball     2x10 on ball 2x20 on ball Standing hip extension and abduction     TB Blue 10x 4\" holds EACH TB Blue 10x 5\" Mary Ann Self [place]  Fire hydrant 20x B   
RDL SL and Double leg        7# DL, 2# SL 10x e on 6\" box 10x at table height practicing technique Stool Scoot       4x walking 4x walking on carpet Shuttle       50# R LE, L LE 2X10 Bridge Rolling back [facility] 
 
MANUAL THERAPY: (25 minutes): Joint mobilization, Soft tissue mobilization and Manipulation was utilized and necessary because of the patient's restricted joint motion, painful spasm, restricted motion of soft tissue. (Used abbreviations: MET - muscle energy technique; PNF - proprioceptive neuromuscular facilitation; NMR - neuromuscular re-education; AP - anterior to posterior; PA - posterior to anterior) MANUAL stretching B HS, piriformis, hip flexor. STM lateral hip. Grade IV mobs lateral hip prone 5 bouts. .  Manual traction supine.  As well as LE long axis distraction B LE. Treatment/Session Assessment:  Michelle Bowen presents today somewhat sore after last session. Continuing to progress strengthening as well as making sure she is doing exercises correctly at home. · Pain/ Symptoms: Initial:   0-1/10  She is very aware that this is a 'slow process' of regaining mobility and improving strength---she has had fair compliance at home, but she has great support from . Post Session:  1/10 · Compliance with Program/Exercises: Will assess as treatment progresses. · Recommendations/Intent for next treatment session: \"Next visit will focus on advancements to more challenging activities\". Future Appointments Date Time Provider Fernanda Resendiz 7/25/2018 4:15 PM Kely Parker [facility]PT [facility]  
8/2/2018 4:15 PM Kely Parker DPT [facility]  
8/3/2018 1:45 PM Kely Parker [facility]PT [facility]  
8/7/2018 4:15 PM Kely Parker [facility]PT [facility]  
8/9/2018 4:15 PM Kely Parker [facility]PT [facility]  
9/4/2018 4:15 PM Keyl Parker DPT [facility]  
9/6/2018 4:15 PM Kely Parker [facility]PT [facility]  
9/11/2018 4:15 PM Kely Parker [facility]PT [facility]  
9/13/2018 4:15 PM Kely Parker [facility]PT [facility]  
9/20/2018 4:15 PM Kely Parker [facility]PT [facility]  
9/21/2018 8:00 AM Kely Parker DPT [facility]  
9/27/2018 4:15 PM Kely Parker DPT [facility] Total Treatment Duration: 36 ' PT Patient Time In/Time Out Time In: 3039 Time Out: 1600 Kely Parker DPT

## 2018-07-25 ENCOUNTER — HOSPITAL ENCOUNTER (OUTPATIENT)
Dept: PHYSICAL THERAPY | Age: 58
Discharge: HOME | End: 2018-07-25
Attending: INTERNAL MEDICINE
Payer: COMMERCIAL

## 2018-07-25 PROCEDURE — 97110 THERAPEUTIC EXERCISES: CPT

## 2018-07-25 NOTE — PROGRESS NOTES
Aidee Elliott : 1960 Payor: Jenny Nuñez / Plan: [place] [facility] / Product Type: PPO /  Vicky Garcia at [facility] [address] [facility], [address] [facility], [facility], [id number] [place] [address] [phone]   [phone] OUTPATIENT PHYSICAL THERAPY:Daily Note 2018   Visit # 10 ICD-10: Treatment Diagnosis: Low back pain (M54.5) Bilateral leg pain [M79.604, M79.605] Difficulty walking [R26.2] Precautions/Allergies:  
Lamisil [terbinafine] Fall Risk Score: 0 (? 5 = High Risk) MD Orders: Eval and Treat  MEDICAL/REFERRING DIAGNOSIS: 
Bilateral leg pain DATE OF ONSET: Chronic REFERRING PHYSICIAN: John Araiza MD 
RETURN PHYSICIAN APPOINTMENT: TBD by Aideesony Brushs INITIAL ASSESSMENT:  Ms. Aidee Elliott has attended 1 physical therapy session including initial evaluation. Aidee Elliott presents with significant tightness to B hip internal rotators, and hip flexors. hamstrings and global LE (R > L). This is most likely due to her sitting at work and constantly in hip flexion/lower cross syndrome. Unable to perform JOSE L LE and requires much assist to get into position R LE. Strength additionally decreased as well as cardiopulmonary endurance. She voices that she has sat for majority of her work life and does not stretch regularly at home. Ambulation 30 minutes on treadmill is not painful. Pain exacerbates with prolonged sitting (>1 hour). Anterior rotation of R innominate present. Aidee Elliott will benefit from skilled PT (medically necessary) to address above deficits affecting participation in basic ADLs and overall functional tolerance. Manual techniques (stretching, joint mobilizations, soft tissue mobilization/myofascial release), postural exercises/education, therapeutic techniques/activities, and HEP will be performed as appropriate addressing Khushbu Rea's current condition. PROBLEM LIST (Impacting functional limitations): 1. Decreased Strength 2. Decreased ADL/Functional Activities 3. Decreased Transfer Abilities 4. Decreased Ambulation Ability/Technique 5. Decreased Balance 6. Increased Pain 7. Decreased Activity Tolerance 8. Increased Fatigue 9. Increased Shortness of Breath 10. Decreased Flexibility/Joint Mobility 11. Decreased [place] with Home Exercise Program INTERVENTIONS PLANNED: 
1. Balance Exercise 2. Bed Mobility 3. Cold 4. Cryotherapy 5. Electrical Stimulation 6. Family Education 7. Gait Training 8. Heat 9. Home Exercise Program (HEP) 10. Manual Therapy 11. Neuromuscular Re-education/Strengthening 12. Range of Motion (ROM) 13. Therapeutic Activites 14. Therapeutic Exercise/Strengthening 15. Transfer Training TREATMENT PLAN: 
Effective Dates: 6.19.18 TO 9/17/2018 (90 days). Frequency/Duration: 2-3 times a week for 90 Days GOALS: (Goals have been discussed and agreed upon with patient.) Short Term Goals 4 weeks 1. Jasiel Monsalve will be independent with HEP 2. Jasiel Monsalve will participate in LE stretching program to increase flexibility 3. Jasiel Monsalve will participate in core stabilization exercises to help with stabilization during ADLs 4. Jasiel Monsalve will participate in LE strengthening program with weights as appropriate to help with gait and elevations 5. Jasiel Monsalve will participate in static and dynamic balance activities to decrease the risk for falls 6. Jasiel Monsalve will tolerate manual therapy/joint mobilizations/soft tissue to increase ROM and decrease pain 7. Jasiel Monsalve will be able to cross her legs with minimal difficulty and no pain. [address] will be able to get in and out of the car with minimal to no difficulty. 9. Jasiel Monsalve pelvis will stay level with regards to innominate position. Long Term Goals 8 weeks 1.  Jasiel Monsalve will demonstrate a 8 point improvement on the Oswestry to show improvement in function 2. Jasiel Monsalve will report 0/10 pain at rest and during ADLs 3. Jasiel Monsalve will demonstrate 5/5 LE strength on manual muscle testing 4. Jasiel Monsalve will be able to perform SLS >5 seconds bilaterally to help with gait and improve balance HISTORY:  
History of Present Injury/Illness (Reason for Referral): Jasiel Monsalve cannot cross her legs anymore and she has pain in her legs. She has increased stiffness. She talked with MD and explained she cannot cross her legs. She gets pain with crossing legs. She's been seeing Racquel Christina at [facility] to address soft tissue and flexibility. [place] is from [place] and traveled a couple weekends ago. She had a PT treatment before she left and it was not as hard for her to sit in car as usual.  The pain is not localized and dull. She has been sitting for many years with her occupation. Denies hx of knee pain or back pain. Pain is mainly to lateral hips. PMHx reveals: 
 
was trying to work out at [employer] but has been having more issues with walking Has been having issues of leg and hip pain bilateral for 2 yrs Her mobility has become restricted to the point she is unable to cross her legs and one leg is higher She has difficulty with squats and pain with prolonged sitting Immobility  In her glutes, psoas. Adductors and hamstring and quads 
  
Sent her for initial evaluation at [facility] and working on decrease in her pain and working on soft tissue  
  
Now would like to pursue more PT thru [facility] as needs strengthening and conditioning as well 
 
 
 
-Present symptoms/complaints (on day of evaluation) Pain Scale: · Current: 3/10 · Best: 3/10 · Worst:  7-8/10 Past Medical History/Comorbidities: Ms. Abran Perez  has a past medical history of Abnormal finding on EKG; Chicken pox; Depression; Headache; Insomnia, transient (12/13/12);  Measles; Mumps; Thyroid disease; and Uterine prolapse (12/13/12). Ms. Kj Kelsey  has a past surgical history that includes hx hysteroscopy with endometrial ablation (2002); hx appendectomy (age 12); hx lap cholecystectomy (~2013); hx splenectomy; and hx heent. Social History/Living Environment:  
 
Gagandeep Gonazlez [place] front office @ [facility] Social History Social History  Marital status:  Spouse name: N/A  
 Number of children: N/A  
 Years of education: N/A Occupational History  Not on file. Social History Main Topics  Smoking status: Former Smoker Packs/day: 0.25 Years: 10.00 Quit date: 2001  Smokeless tobacco: Never Used  Alcohol use Yes Comment: 1-2 month  Drug use: No  
 Sexual activity: Yes  
  Partners: Male Birth control/ protection: Surgical  
   Comment: vasectomy Other Topics Concern  Not on file Social History Narrative Prior Level of Function/Work/Activity: 
Independent. Active Ambulatory Problems Diagnosis Date Noted  Insomnia 11/17/2015  Perimenopausal 11/17/2015  Goiter 11/17/2015  Rosacea 11/17/2015  Postoperative hypothyroidism 03/30/2016  Hypercholesterolemia 03/30/2016  Prediabetes 03/27/2017  Multinodular goiter 02/01/2018  Muscle ache of extremity 04/30/2018  Essential hypertension 06/06/2018 Resolved Ambulatory Problems Diagnosis Date Noted  No Resolved Ambulatory Problems Past Medical History:  
Diagnosis Date  Abnormal finding on EKG  Chicken pox  Depression  Headache  Insomnia, transient 12/13/12  Measles  Mumps  Thyroid disease  Uterine prolapse 12/13/12 Note: Patient denies any increase of symptoms with cough, sneeze or valsalva. Patient denies any saddle paresthesia or bowel/bladder deficits. Personal Factors:   
      Sex:  female Age:  65 y.o.  
Current Medications:   
Current Outpatient Prescriptions:  
  valACYclovir (VALTREX) 1 gram tablet, Take 1 Tab by mouth three (3) times daily. , Disp: 21 Tab, Rfl: 1   ibuprofen (MOTRIN) 800 mg tablet, Take 1 Tab by mouth every eight (8) hours. , Disp: 30 Tab, Rfl: 1   clonazePAM (KLONOPIN) 1 mg tablet, Take one tablet every evening., Disp: 30 Tab, Rfl: 5 
  losartan (COZAAR) 50 mg tablet, TAKE 1 TABLET DAILY FOR HYPERTENSION, Disp: 90 Tab, Rfl: 3 
  CINNAMON BARK-CHROMIUM PICOLIN PO, Take  by mouth., Disp: , Rfl:  
  LUTEIN PO, Take  by mouth., Disp: , Rfl:  
  MULTIVITAMIN WITH MINERALS (HAIR,SKIN AND NAILS PO), Take  by mouth., Disp: , Rfl:  
  L. ACID/L. CASEI/B.BIF/B.ARLYN/FOS (PROBIOTIC BLEND PO), Take  by mouth., Disp: , Rfl:   ASCORBATE CALCIUM (VITAMIN C PO), Take 1 Tab by mouth daily. Hold for surgery- last dose 2/6/17, Disp: , Rfl:  
  ZINC ACETATE PO, Take 1 Tab by mouth daily. Hold for surgery- last dose 2/6/17, Disp: , Rfl:  
  vitamin e 400 unit tab, Take 1 Tab by mouth daily. Hold for surgery- last dose 2/6/17, Disp: , Rfl:   Biotin 2,500 mcg cap, Take 1 Tab by mouth daily. Hold for surgery- last dose 2/6/17, Disp: , Rfl:  
  Calcium-Cholecalciferol, D3, (CALCIUM 600 WITH VITAMIN D3) 600 mg(1,500mg) -400 unit cap, Take 1 Tab by mouth daily. Hold for surgery- last dose 2/6/17, Disp: , Rfl:  
  omega-3 fatty acids-vitamin e (FISH OIL) 1,000 mg cap, Take 1 Cap by mouth daily. Hold for surgery- last dose 2/6/17, Disp: , Rfl:   
 
Date Last Reviewed:  7/25/2018 EXAMINATION:  
Observation/Orthostatic Postural Assessment:   
      Tightness B hip flexors. Palpation:   
       
ROM:   
       
AROM/PROM Joint: Eval Date: 6/19/18  Re-Assess Date:  Re-Assess Date: Active ROM RIGHT LEFT RIGHT LEFT RIGHT LEFT Knee Extension [facility] Knee Flexion 130  DEG Hip Flexion 40 deg  40 deg Hip Abduction 10 DEG 20 DEG Lumbar Flexion Lumbar Extension Lumbar Side-bending Lumbar Rotation Strength:   
 Eval Date:  6/19/18  Re-Assess Date:  Re-Assess Date:   
  RIGHT LEFT RIGHT LEFT RIGHT LEFT Knee Flexion  5/5 5/5 Knee Extension (L3, L4) 4/5 4/5 Hip Flexion (L1, L2) 5/5 5/5 Hip Abduction (L5, S1) 4-/5 4-/5 Ankle Dorsiflexion  5/5 5/5 Great Toe Extension (L5) 5/5 5/5 Single leg stance right/left: *Can perform, but fair 
            Deep squat:  Very difficulty with poor flexibility Ham 90/90: 
 RIGHT LE: 40 LEFT LE: 40 Special Tests: JOSE=  + B 
            SLR (<60 degrees)= Negative SLUMP=  Negative SI Joint Tests: + [place], JOSE, Thigh Thrust, ASIS Distraction, Sacral Compression Neurological Screen: t  
 RADIATING SYMPTOMS?: YES/NO--NO--Generalized pain B Hips. Functional Mobility:  Affecting participation in basic ADLs and functional tasks. Balance:   
    Fair Outcome Measure: Tool Used: Lower Extremity Functional Scale (LEFS) Score:  Initial: 43/80 Most Recent: X/80 (Date: -- ) Interpretation of Score: 20 questions each scored on a 5 point scale with 0 representing \"extreme difficulty or unable to perform\" and 4 representing \"no difficulty\". The lower the score, the greater the functional disability. 80/80 represents no disability. Minimal detectable change is 9 points. Score 80 79-63 62-48 47-32 31-16 15-1 0 Modifier CH CI CJ CK CL CM CN  
 
? Mobility - Walking and Moving Around:  
  - CURRENT STATUS: CK - 40%-59% impaired, limited or restricted  - GOAL STATUS: CJ - 20%-39% impaired, limited or restricted  - D/C STATUS:  ---------------To be determined--------------- Medical Necessity:  
· Skilled intervention continues to be required due to above deficits affecting participation in basic ADLs and overall functional tolerance.  
Reason for Services/Other Comments: 
· Patient continues to require skilled intervention due to  above deficits affecting participation in basic ADLs and overall functional tolerance. TREATMENT:  
(In addition to Assessment/Re-Assessment sessions the following treatments were rendered) THERAPEUTIC EXERCISE: (15 minutes):  Exercises per grid below to improve mobility, strength and balance. Required minimal visual and verbal cues to promote proper body alignment and promote proper body posture. Progressed resistance, range and complexity of movement as indicated. Date: 
*6/19/18 Date\:6/620/18 Date: 6/29/18 Date: 
7/3/18 Date: 
7/9/18 Date: 
7/11/18 Date: 
7/18/18 Date: 
7/24/18 Activity/Exercise Parameters Parameters Parameters LTR 10\"x10 IT Band 30\"x3 Hamstring Stretch 30\"x3  30\"x3 manual  30\"x3 manual  30\"x3 manual 30\"X3 manual 30\"X3 manual 30\"x3 manual    
Prone hip extension   10x 10x 2x10 2x10 2x10 2x10 Prone hip flexor stretch Prone HSC  10x 10x Hooklying hip abduction    Purple 2x10 Purple 2x10 Purple 2x10, adduction 20x Bridge on ball     2x10 on ball 2x20 on ball Standing hip extension and abduction     TB Blue 10x 4\" holds EACH TB Blue 10x 5\" Angel MONZON  Fire hydrant 20x B    
RDL SL and Double leg        7# DL, 2# SL 10x e on 6\" box 10x at table height practicing technique Stool Scoot       4x walking 4x walking on carpet Shuttle       50# R LE, L LE 2X10 Bridge Rolling back Cynthia [facility] 
 
MANUAL THERAPY: (25 minutes): Joint mobilization, Soft tissue mobilization and Manipulation was utilized and necessary because of the patient's restricted joint motion, painful spasm, restricted motion of soft tissue. (Used abbreviations: MET - muscle energy technique; PNF - proprioceptive neuromuscular facilitation; NMR - neuromuscular re-education; AP - anterior to posterior; PA - posterior to anterior) MANUAL stretching B HS, piriformis, hip flexor. STM lateral hip. Grade IV mobs e 5 bouts L sacrum. .  Manual traction supine. As well as LE long axis distraction B LE. Treatment/Session Assessment:  Osman Moses presents today somewhat sore after last session. Continuing to progress strengthening as well as making sure she is doing exercises correctly at home. Showed spouse how to perform exercises/stretching at home. Minimal improvement though she knows it will take time. · Pain/ Symptoms: Initial:   0-1/10   Post Session:  1/10 · Compliance with Program/Exercises: Will assess as treatment progresses. · Recommendations/Intent for next treatment session: \"Next visit will focus on advancements to more challenging activities\". Future Appointments Date Time Provider Fernanda Resendiz 8/2/2018 4:15 PM Kendra Sun [facility]PT [facility]  
8/3/2018 1:45 PM Kendra Sun, JACOBO [facility]  
8/7/2018 4:15 PM Kendra Sun DPT [facility]  
8/9/2018 4:15 PM Kendra Sun, DPT [facility]  
9/4/2018 4:15 PM Kendramanuela Douglasse, DPT [facility]  
9/6/2018 4:15 PM Kendra Douglasse, DPT [facility]  
9/11/2018 4:15 PM Kendra Douglasse, DPT [facility]  
9/13/2018 4:15 PM Kendra Sun, INOCENCIOT [facility]  
9/20/2018 4:15 PM Kendra Sun, JACOBO [facility]  
9/21/2018 8:00 AM Kendra Sun [facility]PT [facility]  
9/27/2018 4:15 PM Kendra Sun DPEDDIE [facility] Total Treatment Duration: 36 ' PT Patient Time In/Time Out Time In: 1600 Time Out: 0815 Kendra Sun DPT

## 2018-08-02 ENCOUNTER — HOSPITAL ENCOUNTER (OUTPATIENT)
Dept: PHYSICAL THERAPY | Age: 58
Discharge: HOME | End: 2018-08-02
Attending: INTERNAL MEDICINE
Payer: COMMERCIAL

## 2018-08-02 PROCEDURE — 97140 MANUAL THERAPY 1/> REGIONS: CPT

## 2018-08-02 NOTE — PROGRESS NOTES
Kathy Kan : 1960 Payor: Claudeen Lao / Plan: [place] [facility] / Product Type: PPO /  [address] at [facility] [address] [facility], [address], [facility], [address] [address] [phone]   [phone] OUTPATIENT PHYSICAL THERAPY:Daily Note 2018   Visit # 12 ICD-10: Treatment Diagnosis: Low back pain (M54.5) Bilateral leg pain [M79.604, M79.605] Difficulty walking [R26.2] Precautions/Allergies:  
Lamisil [terbinafine] Fall Risk Score: 0 (? 5 = High Risk) MD Orders: Eval and Treat  MEDICAL/REFERRING DIAGNOSIS: 
Bilateral leg pain DATE OF ONSET: Chronic REFERRING PHYSICIAN: Mahnaz Lind MD 
RETURN PHYSICIAN APPOINTMENT: TBD by Kathy Kan INITIAL ASSESSMENT:  Ms. Kathy Kan has attended 1 physical therapy session including initial evaluation. Kathy Kan presents with significant tightness to B hip internal rotators, and hip flexors. hamstrings and global LE (R > L). This is most likely due to her sitting at work and constantly in hip flexion/lower cross syndrome. Unable to perform JOSE L LE and requires much assist to get into position R LE. Strength additionally decreased as well as cardiopulmonary endurance. She voices that she has sat for majority of her work life and does not stretch regularly at home. Ambulation 30 minutes on treadmill is not painful. Pain exacerbates with prolonged sitting (>1 hour). Anterior rotation of R innominate present. Kathy Kan will benefit from skilled PT (medically necessary) to address above deficits affecting participation in basic ADLs and overall functional tolerance. Manual techniques (stretching, joint mobilizations, soft tissue mobilization/myofascial release), postural exercises/education, therapeutic techniques/activities, and HEP will be performed as appropriate addressing Antonietta Nielsenments's current condition. PROBLEM LIST (Impacting functional limitations): 1. Decreased Strength 2. Decreased ADL/Functional Activities 3. Decreased Transfer Abilities 4. Decreased Ambulation Ability/Technique 5. Decreased Balance 6. Increased Pain 7. Decreased Activity Tolerance 8. Increased Fatigue 9. Increased Shortness of Breath 10. Decreased Flexibility/Joint Mobility 11. Decreased [place] with Home Exercise Program INTERVENTIONS PLANNED: 
1. Balance Exercise 2. Bed Mobility 3. Cold 4. Cryotherapy 5. Electrical Stimulation 6. Family Education 7. Gait Training 8. Heat 9. Home Exercise Program (HEP) 10. Manual Therapy 11. Neuromuscular Re-education/Strengthening 12. Range of Motion (ROM) 13. Therapeutic Activites 14. Therapeutic Exercise/Strengthening 15. Transfer Training TREATMENT PLAN: 
Effective Dates: 6.19.18 TO 9/17/2018 (90 days). Frequency/Duration: 2-3 times a week for 90 Days GOALS: (Goals have been discussed and agreed upon with patient.) Short Term Goals 4 weeks 1. Sarah Wolf will be independent with HEP 2. Sarah Wolf will participate in LE stretching program to increase flexibility 3. Sarah Wolf will participate in core stabilization exercises to help with stabilization during ADLs 4. Sarah Wolf will participate in LE strengthening program with weights as appropriate to help with gait and elevations 5. Sarah Wolf will participate in static and dynamic balance activities to decrease the risk for falls 6. Sarah Wolf will tolerate manual therapy/joint mobilizations/soft tissue to increase ROM and decrease pain 7. Sarah Wolf will be able to cross her legs with minimal difficulty and no pain. [address] will be able to get in and out of the car with minimal to no difficulty. 9. Sarah Wolf pelvis will stay level with regards to innominate position. Long Term Goals 8 weeks 1.  Sarah Wolf will demonstrate a 8 point improvement on the Oswestry to show improvement in function 2. Zach Mike will report 0/10 pain at rest and during ADLs 3. Zach Mike will demonstrate 5/5 LE strength on manual muscle testing 4. Zach Mike will be able to perform SLS >5 seconds bilaterally to help with gait and improve balance HISTORY:  
History of Present Injury/Illness (Reason for Referral): Zach Mike cannot cross her legs anymore and she has pain in her legs. She has increased stiffness. She talked with MD and explained she cannot cross her legs. She gets pain with crossing legs. She's been seeing Oscar Kenney at [facility] to address soft tissue and flexibility. Zach Mike is from [place] and traveled a couple weekends ago. She had a PT treatment before she left and it was not as hard for her to sit in car as usual.  The pain is not localized and dull. She has been sitting for many years with her occupation. Denies hx of knee pain or back pain. Pain is mainly to lateral hips. PMHx reveals: 
 
was trying to work out at [employer] but has been having more issues with walking Has been having issues of leg and hip pain bilateral for 2 yrs Her mobility has become restricted to the point she is unable to cross her legs and one leg is higher She has difficulty with squats and pain with prolonged sitting Immobility  In her glutes, psoas. Adductors and hamstring and quads 
  
Sent her for initial evaluation at [facility] and working on decrease in her pain and working on soft tissue  
  
Now would like to pursue more PT thru [facility] as needs strengthening and conditioning as well 
 
 
 
-Present symptoms/complaints (on day of evaluation) Pain Scale: · Current: 3/10 · Best: 3/10 · Worst:  7-8/10 Past Medical History/Comorbidities: Ms. Jonnie Danielson  has a past medical history of Abnormal finding on EKG; Chicken pox; Depression; Headache; Insomnia, transient (12/13/12);  Measles; Mumps; Thyroid disease; and Uterine prolapse (12/13/12). Ms. Leeta Osler  has a past surgical history that includes hx hysteroscopy with endometrial ablation (2002); hx appendectomy (age 12); hx lap cholecystectomy (~2013); hx splenectomy; and hx heent. Social History/Living Environment:  
 
Peter Spaulding works front office @ [employer] Social History Social History  Marital status:  Spouse name: N/A  
 Number of children: N/A  
 Years of education: N/A Occupational History  Not on file. Social History Main Topics  Smoking status: Former Smoker Packs/day: 0.25 Years: 10.00 Quit date: 2001  Smokeless tobacco: Never Used  Alcohol use Yes Comment: 1-2 month  Drug use: No  
 Sexual activity: Yes  
  Partners: Male Birth control/ protection: Surgical  
   Comment: vasectomy Other Topics Concern  Not on file Social History Narrative Prior Level of Function/Work/Activity: 
Independent. Active Ambulatory Problems Diagnosis Date Noted  Insomnia 11/17/2015  Perimenopausal 11/17/2015  Goiter 11/17/2015  Rosacea 11/17/2015  Postoperative hypothyroidism 03/30/2016  Hypercholesterolemia 03/30/2016  Prediabetes 03/27/2017  Multinodular goiter 02/01/2018  Muscle ache of extremity 04/30/2018  Essential hypertension 06/06/2018 Resolved Ambulatory Problems Diagnosis Date Noted  No Resolved Ambulatory Problems Past Medical History:  
Diagnosis Date  Abnormal finding on EKG  Chicken pox  Depression  Headache  Insomnia, transient 12/13/12  Measles  Mumps  Thyroid disease  Uterine prolapse 12/13/12 Note: Patient denies any increase of symptoms with cough, sneeze or valsalva. Patient denies any saddle paresthesia or bowel/bladder deficits. Personal Factors:   
      Sex:  female Age:  65 y.o.  
Current Medications:   
Current Outpatient Prescriptions:  
  valACYclovir (VALTREX) 1 gram tablet, Take 1 Tab by mouth three (3) times daily. , Disp: 21 Tab, Rfl: 1   ibuprofen (MOTRIN) 800 mg tablet, Take 1 Tab by mouth every eight (8) hours. , Disp: 30 Tab, Rfl: 1   clonazePAM (KLONOPIN) 1 mg tablet, Take one tablet every evening., Disp: 30 Tab, Rfl: 5 
  losartan (COZAAR) 50 mg tablet, TAKE 1 TABLET DAILY FOR HYPERTENSION, Disp: 90 Tab, Rfl: 3 
  CINNAMON BARK-CHROMIUM PICOLIN PO, Take  by mouth., Disp: , Rfl:  
  LUTEIN PO, Take  by mouth., Disp: , Rfl:  
  MULTIVITAMIN WITH MINERALS (HAIR,SKIN AND NAILS PO), Take  by mouth., Disp: , Rfl:  
  L. ACID/L. CASEI/B.BIF/B.ARLYN/FOS (PROBIOTIC BLEND PO), Take  by mouth., Disp: , Rfl:   ASCORBATE CALCIUM (VITAMIN C PO), Take 1 Tab by mouth daily. Hold for surgery- last dose 2/6/17, Disp: , Rfl:  
  ZINC ACETATE PO, Take 1 Tab by mouth daily. Hold for surgery- last dose 2/6/17, Disp: , Rfl:  
  vitamin e 400 unit tab, Take 1 Tab by mouth daily. Hold for surgery- last dose 2/6/17, Disp: , Rfl:   Biotin 2,500 mcg cap, Take 1 Tab by mouth daily. Hold for surgery- last dose 2/6/17, Disp: , Rfl:  
  Calcium-Cholecalciferol, D3, (CALCIUM 600 WITH VITAMIN D3) 600 mg(1,500mg) -400 unit cap, Take 1 Tab by mouth daily. Hold for surgery- last dose 2/6/17, Disp: , Rfl:  
  omega-3 fatty acids-vitamin e (FISH OIL) 1,000 mg cap, Take 1 Cap by mouth daily. Hold for surgery- last dose 2/6/17, Disp: , Rfl:   
 
Date Last Reviewed:  8/2/2018 EXAMINATION:  
Observation/Orthostatic Postural Assessment:   
      Tightness B hip flexors. Palpation:   
       
ROM:   
       
AROM/PROM Joint: Eval Date: 6/19/18  Re-Assess Date:  Re-Assess Date: Active ROM RIGHT LEFT RIGHT LEFT RIGHT LEFT Knee Extension [facility] Knee Flexion 130  DEG Hip Flexion 40 deg  40 deg Hip Abduction 10 DEG 20 DEG Lumbar Flexion Lumbar Extension Lumbar Side-bending Lumbar Rotation Strength:   
 Eval Date:  6/19/18  Re-Assess Date:  Re-Assess Date:   
  RIGHT LEFT RIGHT LEFT RIGHT LEFT Knee Flexion  5/5 5/5 Knee Extension (L3, L4) 4/5 4/5 Hip Flexion (L1, L2) 5/5 5/5 Hip Abduction (L5, S1) 4-/5 4-/5 Ankle Dorsiflexion  5/5 5/5 Great Toe Extension (L5) 5/5 5/5 Single leg stance right/left: *Can perform, but fair 
            Deep squat:  Very difficulty with poor flexibility Ham 90/90: 
 RIGHT LE: 40 LEFT LE: 40 Special Tests: JOSE=  + B 
            SLR (<60 degrees)= Negative SLUMP=  Negative SI Joint Tests: + [place], JOSE, Thigh Thrust, ASIS Distraction, Sacral Compression Neurological Screen: t  
 RADIATING SYMPTOMS?: YES/NO--NO--Generalized pain B Hips. Functional Mobility:  Affecting participation in basic ADLs and functional tasks. Balance:   
    Fair Outcome Measure: Tool Used: Lower Extremity Functional Scale (LEFS) Score:  Initial: 43/80 Most Recent: X/80 (Date: -- ) Interpretation of Score: 20 questions each scored on a 5 point scale with 0 representing \"extreme difficulty or unable to perform\" and 4 representing \"no difficulty\". The lower the score, the greater the functional disability. 80/80 represents no disability. Minimal detectable change is 9 points. Score 80 79-63 62-48 47-32 31-16 15-1 0 Modifier CH CI CJ CK CL CM CN  
 
? Mobility - Walking and Moving Around:  
  - CURRENT STATUS: CK - 40%-59% impaired, limited or restricted  - GOAL STATUS: CJ - 20%-39% impaired, limited or restricted  - D/C STATUS:  ---------------To be determined--------------- Medical Necessity:  
· Skilled intervention continues to be required due to above deficits affecting participation in basic ADLs and overall functional tolerance.  
Reason for Services/Other Comments: 
· Patient continues to require skilled intervention due to  above deficits affecting participation in basic ADLs and overall functional tolerance. TREATMENT:  
(In addition to Assessment/Re-Assessment sessions the following treatments were rendered) THERAPEUTIC EXERCISE: (minutes):  Exercises per grid below to improve mobility, strength and balance. Required minimal visual and verbal cues to promote proper body alignment and promote proper body posture. Progressed resistance, range and complexity of movement as indicated. Date: 
*6/19/18 Date: 
6/20/18 Date: 6/29/18 Date: 
7/3/18 Date: 
7/9/18 Date: 
7/11/18 Date: 
7/18/18 Date: 
7/24/18 Date: 
8/2/18 Activity/Exercise Parameters Parameters Parameters LTR 10\"x10 IT Band 30\"x3 Hamstring Stretch 30\"x3  30\"x3 manual  30\"x3 manual  30\"x3 manual 30\"X3 manual 30\"X3 manual 30\"x3 manual  30\"x3 manual   
Prone hip extension   10x 10x 2x10 2x10 2x10 2x10 2X10 Prone hip flexor stretch Prone HSC  10x 10x Hooklying hip abduction    Purple 2x10 Purple 2x10 Purple 2x10, adduction 20x Bridge on ball     2x10 on ball 2x20 on ball Standing hip extension and abduction     TB Blue 10x 4\" holds EACH TB Blue 10x 5\" Ma [place]  Fire hydrant 20x B    
RDL SL and Double leg        7# DL, 2# SL 10x e on 6\" box 10x at table height practicing technique Stool Scoot       4x walking 4x walking on carpet Shuttle       50# R LE, L LE 2X10 Bridge Rolling back Sundeep [place] Doorway stretch         30\"x3 MedBridge Portal 
 
MANUAL THERAPY: (38 minutes): Joint mobilization, Soft tissue mobilization and Manipulation was utilized and necessary because of the patient's restricted joint motion, painful spasm, restricted motion of soft tissue.   
(Used abbreviations: MET - muscle energy technique; PNF - proprioceptive neuromuscular facilitation; NMR - neuromuscular re-education; AP - anterior to posterior; PA - posterior to anterior) MANUAL stretching B HS, piriformis, hip flexor. STM lateral hip. Grade IV mobs e 5 bouts L sacrum. .  Manual traction supine. As well as LE long axis distraction B LE. Treatment/Session Assessment:  Cha Zhu presents today somewhat sore after last session. Continuing to progress strengthening as well as making sure she is doing exercises correctly at home. Showed spouse how to perform exercises/stretching at home. Minimal improvement though she knows it will take time. Shown doorway stretch to open up chest.  
 
· Pain/ Symptoms: Initial:   0-1/10   Post Session:  1/10 · Compliance with Program/Exercises: Will assess as treatment progresses. · Recommendations/Intent for next treatment session: \"Next visit will focus on advancements to more challenging activities\". Future Appointments Date Time Provider Fernanda Resendiz 8/2/2018 4:15 PM Lynnann Vivien, DPT [facility]  
8/3/2018 1:45 PM Lynnann Vivien, DPT [facility]  
8/7/2018 4:15 PM Lynnann Vivien, DPT [facility]  
8/9/2018 4:15 PM Lynnann Vivien, DPT [facility]  
9/4/2018 4:15 PM Lynnann Vivien, DPT [facility]  
9/6/2018 4:15 PM Lynnann Vivien, DPT [facility]  
9/11/2018 4:15 PM Lynnann Vivien, [facility]PT [facility]  
9/13/2018 4:15 PM Lynnann Vivien, DPT [facility]  
9/20/2018 4:15 PM Lynnann Vivien, DPT [facility]  
9/21/2018 8:00 AM Lynnann Vivien, DPT [facility]  
9/27/2018 4:15 PM Lynnann Vivien, DPT [facility] Total Treatment Duration: 36 ' PT Patient Time In/Time Out Time In: 1600 Time Out: [id number] Lynnann Vivien, DPT

## 2018-08-03 ENCOUNTER — HOSPITAL ENCOUNTER (OUTPATIENT)
Dept: PHYSICAL THERAPY | Age: 58
Discharge: HOME | End: 2018-08-03
Attending: INTERNAL MEDICINE
Payer: COMMERCIAL

## 2018-08-03 PROCEDURE — 97110 THERAPEUTIC EXERCISES: CPT

## 2018-08-03 NOTE — PROGRESS NOTES
Rosendo Baumann : 1960 Payor: Magui De La Cruz / Plan: [place] [facility] / Product Type: PPO /  [address] at [facility] [address] [facility], [address] [facility], [facility], [address] [place] [address] [phone]   [phone] OUTPATIENT PHYSICAL THERAPY:Daily Note 8/3/2018   Visit # 12 ICD-10: Treatment Diagnosis: Low back pain (M54.5) Bilateral leg pain [M79.604, M79.605] Difficulty walking [R26.2] Precautions/Allergies:  
Lamisil [terbinafine] Fall Risk Score: 0 (? 5 = High Risk) MD Orders: Eval and Treat  MEDICAL/REFERRING DIAGNOSIS: 
Bilateral leg pain DATE OF ONSET: Chronic REFERRING PHYSICIAN: Surya Wong MD 
RETURN PHYSICIAN APPOINTMENT: TBD by Rosendo Baumann INITIAL ASSESSMENT:  Ms. Rosendo Baumann has attended 1 physical therapy session including initial evaluation. Rosendo Baumann presents with significant tightness to B hip internal rotators, and hip flexors. hamstrings and global LE (R > L). This is most likely due to her sitting at work and constantly in hip flexion/lower cross syndrome. Unable to perform JOSE L LE and requires much assist to get into position R LE. Strength additionally decreased as well as cardiopulmonary endurance. She voices that she has sat for majority of her work life and does not stretch regularly at home. Ambulation 30 minutes on treadmill is not painful. Pain exacerbates with prolonged sitting (>1 hour). Anterior rotation of R innominate present. Rosendo Baumann will benefit from skilled PT (medically necessary) to address above deficits affecting participation in basic ADLs and overall functional tolerance. Manual techniques (stretching, joint mobilizations, soft tissue mobilization/myofascial release), postural exercises/education, therapeutic techniques/activities, and HEP will be performed as appropriate addressing Boojalilnikolai Allyson Rea's current condition. PROBLEM LIST (Impacting functional limitations): 1. Decreased Strength 2. Decreased ADL/Functional Activities 3. Decreased Transfer Abilities 4. Decreased Ambulation Ability/Technique 5. Decreased Balance 6. Increased Pain 7. Decreased Activity Tolerance 8. Increased Fatigue 9. Increased Shortness of Breath 10. Decreased Flexibility/Joint Mobility 11. Decreased [place] with Home Exercise Program INTERVENTIONS PLANNED: 
1. Balance Exercise 2. Bed Mobility 3. Cold 4. Cryotherapy 5. Electrical Stimulation 6. Family Education 7. Gait Training 8. Heat 9. Home Exercise Program (HEP) 10. Manual Therapy 11. Neuromuscular Re-education/Strengthening 12. Range of Motion (ROM) 13. Therapeutic Activites 14. Therapeutic Exercise/Strengthening 15. Transfer Training TREATMENT PLAN: 
Effective Dates: 6.19.18 TO 9/17/2018 (90 days). Frequency/Duration: 2-3 times a week for 90 Days GOALS: (Goals have been discussed and agreed upon with patient.) Short Term Goals 4 weeks 1. Peter Spaulding will be independent with HEP 2. Peter Spaulding will participate in LE stretching program to increase flexibility 3. Peter Spaulding will participate in core stabilization exercises to help with stabilization during ADLs 4. Peter Spaulding will participate in LE strengthening program with weights as appropriate to help with gait and elevations 5. Peter Spaulding will participate in static and dynamic balance activities to decrease the risk for falls 6. Peter Spaulding will tolerate manual therapy/joint mobilizations/soft tissue to increase ROM and decrease pain 7. Peter Spaulding will be able to cross her legs with minimal difficulty and no pain. [address] will be able to get in and out of the car with minimal to no difficulty. 9. Peter Spaulding pelvis will stay level with regards to innominate position. Long Term Goals 8 weeks 1.  Peter Spaulding will demonstrate a 8 point improvement on the Oswestry to show improvement in function 2. Luis Kasper will report 0/10 pain at rest and during ADLs 3. Luis Kasper will demonstrate 5/5 LE strength on manual muscle testing 4. Luis Kasper will be able to perform SLS >5 seconds bilaterally to help with gait and improve balance HISTORY:  
History of Present Injury/Illness (Reason for Referral): Luis Kasper cannot cross her legs anymore and she has pain in her legs. She has increased stiffness. She talked with MD and explained she cannot cross her legs. She gets pain with crossing legs. She's been seeing Swapna Shah at [facility] to address soft tissue and flexibility. Luis Kasper is from [place] and traveled a couple weekends ago. She had a PT treatment before she left and it was not as hard for her to sit in car as usual.  The pain is not localized and dull. She has been sitting for many years with her occupation. Denies hx of knee pain or back pain. Pain is mainly to lateral hips. PMHx reveals: 
 
was trying to work out at [employer] but has been having more issues with walking Has been having issues of leg and hip pain bilateral for 2 yrs Her mobility has become restricted to the point she is unable to cross her legs and one leg is higher She has difficulty with squats and pain with prolonged sitting Immobility  In her glutes, psoas. Adductors and hamstring and quads 
  
Sent her for initial evaluation at [facility] and working on decrease in her pain and working on soft tissue  
  
Now would like to pursue more PT thru [facility] as needs strengthening and conditioning as well 
 
 
 
-Present symptoms/complaints (on day of evaluation) Pain Scale: · Current: 3/10 · Best: 3/10 · Worst:  7-8/10 Past Medical History/Comorbidities: Ms. Marge Posey  has a past medical history of Abnormal finding on EKG; Chicken pox; Depression; Headache; Insomnia, transient (12/13/12);  Measles; Mumps; Thyroid disease; and Uterine prolapse (12/13/12). Ms. Earl Romero  has a past surgical history that includes hx hysteroscopy with endometrial ablation (2002); hx appendectomy (age 12); hx lap cholecystectomy (~2013); hx splenectomy; and hx heent. Social History/Living Environment:  
 
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  
 Number of children: N/A  
 Years of education: N/A Occupational History  Not on file. Social History Main Topics  Smoking status: Former Smoker Packs/day: 0.25 Years: 10.00 Quit date: 2001  Smokeless tobacco: Never Used  Alcohol use Yes Comment: 1-2 month  Drug use: No  
 Sexual activity: Yes  
  Partners: Male Birth control/ protection: Surgical  
   Comment: vasectomy Other Topics Concern  Not on file Social History Narrative Prior Level of Function/Work/Activity: 
Independent. Active Ambulatory Problems Diagnosis Date Noted  Insomnia 11/17/2015  Perimenopausal 11/17/2015  Goiter 11/17/2015  Rosacea 11/17/2015  Postoperative hypothyroidism 03/30/2016  Hypercholesterolemia 03/30/2016  Prediabetes 03/27/2017  Multinodular goiter 02/01/2018  Muscle ache of extremity 04/30/2018  Essential hypertension 06/06/2018 Resolved Ambulatory Problems Diagnosis Date Noted  No Resolved Ambulatory Problems Past Medical History:  
Diagnosis Date  Abnormal finding on EKG  Chicken pox  Depression  Headache  Insomnia, transient 12/13/12  Measles  Mumps  Thyroid disease  Uterine prolapse 12/13/12 Note: Patient denies any increase of symptoms with cough, sneeze or valsalva. Patient denies any saddle paresthesia or bowel/bladder deficits. Personal Factors:   
      Sex:  female Age:  65 y.o.  
Current Medications:   
Current Outpatient Prescriptions:  
  valACYclovir (VALTREX) 1 gram tablet, Take 1 Tab by mouth three (3) times daily. , Disp: 21 Tab, Rfl: 1   ibuprofen (MOTRIN) 800 mg tablet, Take 1 Tab by mouth every eight (8) hours. , Disp: 30 Tab, Rfl: 1   clonazePAM (KLONOPIN) 1 mg tablet, Take one tablet every evening., Disp: 30 Tab, Rfl: 5 
  losartan (COZAAR) 50 mg tablet, TAKE 1 TABLET DAILY FOR HYPERTENSION, Disp: 90 Tab, Rfl: 3 
  CINNAMON BARK-CHROMIUM PICOLIN PO, Take  by mouth., Disp: , Rfl:  
  LUTEIN PO, Take  by mouth., Disp: , Rfl:  
  MULTIVITAMIN WITH MINERALS (HAIR,SKIN AND NAILS PO), Take  by mouth., Disp: , Rfl:  
  L. ACID/L. CASEI/B.BIF/B.ARLYN/FOS (PROBIOTIC BLEND PO), Take  by mouth., Disp: , Rfl:   ASCORBATE CALCIUM (VITAMIN C PO), Take 1 Tab by mouth daily. Hold for surgery- last dose 2/6/17, Disp: , Rfl:  
  ZINC ACETATE PO, Take 1 Tab by mouth daily. Hold for surgery- last dose 2/6/17, Disp: , Rfl:  
  vitamin e 400 unit tab, Take 1 Tab by mouth daily. Hold for surgery- last dose 2/6/17, Disp: , Rfl:   Biotin 2,500 mcg cap, Take 1 Tab by mouth daily. Hold for surgery- last dose 2/6/17, Disp: , Rfl:  
  Calcium-Cholecalciferol, D3, (CALCIUM 600 WITH VITAMIN D3) 600 mg(1,500mg) -400 unit cap, Take 1 Tab by mouth daily. Hold for surgery- last dose 2/6/17, Disp: , Rfl:  
  omega-3 fatty acids-vitamin e (FISH OIL) 1,000 mg cap, Take 1 Cap by mouth daily. Hold for surgery- last dose 2/6/17, Disp: , Rfl:   
 
Date Last Reviewed:  8/3/2018 EXAMINATION:  
Observation/Orthostatic Postural Assessment:   
      Tightness B hip flexors. Palpation:   
       
ROM:   
       
AROM/PROM Joint: Eval Date: 6/19/18  Re-Assess Date:  Re-Assess Date: Active ROM RIGHT LEFT RIGHT LEFT RIGHT LEFT Knee Extension [facility] Knee Flexion 130  DEG Hip Flexion 40 deg  40 deg Hip Abduction 10 DEG 20 DEG Lumbar Flexion Lumbar Extension Lumbar Side-bending Lumbar Rotation Strength:   
 Eval Date:  6/19/18  Re-Assess Date:  Re-Assess Date:   
  RIGHT LEFT RIGHT LEFT RIGHT LEFT Knee Flexion  5/5 5/5 Knee Extension (L3, L4) 4/5 4/5 Hip Flexion (L1, L2) 5/5 5/5 Hip Abduction (L5, S1) 4-/5 4-/5 Ankle Dorsiflexion  5/5 5/5 Great Toe Extension (L5) 5/5 5/5 Single leg stance right/left: *Can perform, but fair 
            Deep squat:  Very difficulty with poor flexibility Ham 90/90: 
 RIGHT LE: 40 LEFT LE: 40 Special Tests: JOSE=  + B 
            SLR (<60 degrees)= Negative SLUMP=  Negative SI Joint Tests: + [place], JOSE, Thigh Thrust, ASIS Distraction, Sacral Compression Neurological Screen: t  
 RADIATING SYMPTOMS?: YES/NO--NO--Generalized pain B Hips. Functional Mobility:  Affecting participation in basic ADLs and functional tasks. Balance:   
    Fair Outcome Measure: Tool Used: Lower Extremity Functional Scale (LEFS) Score:  Initial: 43/80 Most Recent: X/80 (Date: -- ) Interpretation of Score: 20 questions each scored on a 5 point scale with 0 representing \"extreme difficulty or unable to perform\" and 4 representing \"no difficulty\". The lower the score, the greater the functional disability. 80/80 represents no disability. Minimal detectable change is 9 points. Score 80 79-63 62-48 47-32 31-16 15-1 0 Modifier CH CI CJ CK CL CM CN  
 
? Mobility - Walking and Moving Around:  
  - CURRENT STATUS: CK - 40%-59% impaired, limited or restricted  - GOAL STATUS: CJ - 20%-39% impaired, limited or restricted  - D/C STATUS:  ---------------To be determined--------------- Medical Necessity:  
· Skilled intervention continues to be required due to above deficits affecting participation in basic ADLs and overall functional tolerance.  
Reason for Services/Other Comments: 
· Patient continues to require skilled intervention due to  above deficits affecting participation in basic ADLs and overall functional tolerance. TREATMENT:  
(In addition to Assessment/Re-Assessment sessions the following treatments were rendered) THERAPEUTIC EXERCISE: (40 minutes):  Exercises per grid below to improve mobility, strength and balance. Required minimal visual and verbal cues to promote proper body alignment and promote proper body posture. Progressed resistance, range and complexity of movement as indicated. Date: 
*6/19/18 Date: 
6/20/18 Date: 6/29/18 Date: 
7/3/18 Date: 
7/9/18 Date: 
7/11/18 Date: 
7/18/18 Date: 
7/24/18 Date: 
8/2/18 Date: 
8/3/18 Activity/Exercise Parameters Parameters Parameters LTR 10\"x10 IT Band 30\"x3 Hamstring Stretch 30\"x3  30\"x3 manual  30\"x3 manual  30\"x3 manual 30\"X3 manual 30\"X3 manual 30\"x3 manual  30\"x3 manual    
Prone hip extension   10x 10x 2x10 2x10 2x10 2x10 2X10 On BOSU  plank 20x Prone hip flexor stretch Prone HSC  10x 10x Hooklying hip abduction    Purple 2x10 Purple 2x10 Purple 2x10, adduction 20x Bridge on ball     2x10 on ball 2x20 on ball Standing hip extension and abduction     TB Blue 10x 4\" holds EACH TB Blue 10x 5\" Lo Slain NV  Fire hydrant 20x B     
RDL SL and Double leg        7# DL, 2# SL 10x e on 6\" box 10x at table height practicing technique Stool Scoot       4x walking 4x walking on carpet Shuttle       50# R LE, L LE 2X10 Bridge Rolling back          [employer] on BOSU 2x10 Plank          NV Doorway stretch         30\"x3 Row on BOSU           Blue x 20 Side to Side TB in hallway with TB Around arms also           X pattern Green HEP 2x SL Squat          NV  
1/2 Knee Core Press Out          Blue foam blue band 2x10 E  
1/2 Kneel Balance          NV Open Book          10\"x10 Treadmill          5' backwards incline 6 2 mph. MedBridge Portal 
 
MANUAL THERAPY: ( minutes): Joint mobilization, Soft tissue mobilization and Manipulation was utilized and necessary because of the patient's restricted joint motion, painful spasm, restricted motion of soft tissue. (Used abbreviations: MET - muscle energy technique; PNF - proprioceptive neuromuscular facilitation; NMR - neuromuscular re-education; AP - anterior to posterior; PA - posterior to anterior) MANUAL stretching B HS, piriformis, hip flexor. Lateral glide with belt on GT4 bouts at 100 deg hip flexion and through hip flexion range. Inferior glide with mobilization of femur into hip flexion;  Grade IV mobs e 5 bouts L sacrum. .  Manual traction supine. As well as LE long axis distraction B LE. Treatment/Session Assessment:  Gagandeep Gonzalez ready for strengthening and mobility work. Began on Treadmill and shown progression of core strengthening. Great progress! Sloght difficulty getting off BOSU but safe with supoprt from PT and PT. .  
 
· Pain/ Symptoms: Initial:   0-1/10   Post Session:  1/10 · Compliance with Program/Exercises: Will assess as treatment progresses. · Recommendations/Intent for next treatment session: \"Next visit will focus on advancements to more challenging activities\". Future Appointments Date Time Provider Fernanda Resendiz 8/7/2018 4:15 PM Gene Bonilla DPT [facility]  
8/9/2018 4:15 PM Gene Bonilla, DPT [facility]  
9/4/2018 4:15 PM Gene Bonilla, DPT [facility]  
9/6/2018 4:15 PM Gene Bonilla, DPT [facility]  
9/11/2018 4:15 PM Gene Bonilla, DPT [facility]  
9/13/2018 4:15 PM Gene Bonilla, DPT [facility]  
9/20/2018 4:15 PM Gene Bonilla, DPT [facility]  
9/21/2018 8:00 AM Gene Bonilla, DPT [facility]  
9/27/2018 4:15 PM Gene Bonilla, DPT [facility] Total Treatment Duration: 36 ' PT Patient Time In/Time Out 
Time In: 1230 Time Out: 7165 Kely Parker DPT

## 2018-08-07 ENCOUNTER — HOSPITAL ENCOUNTER (OUTPATIENT)
Dept: PHYSICAL THERAPY | Age: 58
Discharge: HOME | End: 2018-08-07
Attending: INTERNAL MEDICINE
Payer: COMMERCIAL

## 2018-08-07 PROCEDURE — 97140 MANUAL THERAPY 1/> REGIONS: CPT

## 2018-08-07 NOTE — PROGRESS NOTES
Osman Moses  : 1960      Payor: Tereso Welch / Plan: [facility] / Product Type: PPO /  [id number] [facility], [address] [facility], [id number] [place] [address]  [phone]   [phone]       OUTPATIENT PHYSICAL THERAPY:Progress Report 2018   Visit # 13     ICD-10: Treatment Diagnosis:    Low back pain (M54.5)        Bilateral leg pain [M79.604, M79.605]  Difficulty walking [R26.2]      Precautions/Allergies:   Lamisil [terbinafine]   Fall Risk Score: 0 (? 5 = High Risk)  MD Orders: Eval and Treat  MEDICAL/REFERRING DIAGNOSIS:  Bilateral leg pain   DATE OF ONSET: Chronic  REFERRING PHYSICIAN: Todd Santos MD  RETURN PHYSICIAN APPOINTMENT: TBD by Osman Wattsjoselyn    8.7 Progress Assessment:  Osman Moses has attended 13 sessions including initial evaluation. She reports fair compliance with stretching at home, but overalI do feel she is doing very well with regards to flexibility and strength. She has spent most of her work career in a chair which resulted in significant hip flexor tightness and glute weakness (lower crossed syndrome). She cannot cross leg at this time without assistance; however, the movement is more fluid and she is progressing towards goals. Sessions have incorporate strengthening as well as manual techniques. Note: Pain level has been 0/10 consistently over a few weeks. INITIAL ASSESSMENT:  Ms. Osman Moses has attended 1 physical therapy session including initial evaluation. Osman Moses presents with significant tightness to B hip internal rotators, and hip flexors. hamstrings and global LE (R > L). This is most likely due to her sitting at work and constantly in hip flexion/lower cross syndrome. Unable to perform JOSE L LE and requires much assist to get into position R LE. Strength additionally decreased as well as cardiopulmonary endurance.    She voices that she has sat for majority of her work life and does not stretch regularly at home. Ambulation 30 minutes on treadmill is not painful. Pain exacerbates with prolonged sitting (>1 hour). Anterior rotation of R innominate present. Kathy Kan will benefit from skilled PT (medically necessary) to address above deficits affecting participation in basic ADLs and overall functional tolerance. Manual techniques (stretching, joint mobilizations, soft tissue mobilization/myofascial release), postural exercises/education, therapeutic techniques/activities, and HEP will be performed as appropriate addressing [facility] Rea's current condition. PROBLEM LIST (Impacting functional limitations):  1. Decreased Strength  2. Decreased ADL/Functional Activities  3. Decreased Transfer Abilities  4. Decreased Ambulation Ability/Technique  5. Decreased Balance  6. Increased Pain  7. Decreased Activity Tolerance  8. Increased Fatigue  9. Increased Shortness of Breath  10. Decreased Flexibility/Joint Mobility  11. Decreased [place] with Home Exercise Program INTERVENTIONS PLANNED:  1. Balance Exercise  2. Bed Mobility  3. Cold  4. Cryotherapy  5. Electrical Stimulation  6. Family Education  7. Gait Training  8. Heat  9. Home Exercise Program (HEP)  10. Manual Therapy  11. Neuromuscular Re-education/Strengthening  12. Range of Motion (ROM)  13. Therapeutic Activites  14. Therapeutic Exercise/Strengthening  15. Transfer Training   TREATMENT PLAN:  Effective Dates: 6.19.18 TO 9/17/2018 (90 days). Frequency/Duration: 2-3 times a week for 90 Days  GOALS: (Goals have been discussed and agreed upon with patient.)  Short Term Goals 4 weeks Assessed 8.7.18  1. Kathy Kan will be independent with HEP   Goal Met 8.7.18  2. Kathy Kan will participate in LE stretching program to increase flexibility Goal Met 8.7.18  3.  Kathy Kan will participate in core stabilization exercises to help with stabilization during ADLs Goal Met 8.7.18  4. Zach Mike will participate in LE strengthening program with weights as appropriate to help with gait and elevations Goal Met 8.7.18  5. Zach Mike will participate in static and dynamic balance activities to decrease the risk for falls Goal Met 8.7.18  6. Zach Mike will tolerate manual therapy/joint mobilizations/soft tissue to increase ROM and decrease pain Goal Met 8.7.18  7. Zach Mike will be able to cross her legs with minimal difficulty and no pain. Continue--still limited  8. Zach Mike will be able to get in and out of the car with minimal to no difficulty. Improved   9. Zach Mike pelvis will stay level with regards to innominate position. Goal Met 8.7.18    Long Term Goals 8 weeks Assessed 8.7.18  1. Zach Mike will demonstrate a 8 point improvement on the Oswestry to show improvement in function Goal Met 8.7.18  2. Zach Mike will report 0/10 pain at rest and during ADLs Goal Met 8.7.18  3. Zach Mike will demonstrate 5/5 LE strength on manual muscle testing Goal Met 8.7.18  4. Zach Mike will be able to perform SLS >5 seconds bilaterally to help with gait and improve balance  Goal Met 8.7.18              HISTORY:   History of Present Injury/Illness (Reason for Referral): Zach Mike cannot cross her legs anymore and she has pain in her legs. She has increased stiffness. She talked with MD and explained she cannot cross her legs. She gets pain with crossing legs. She's been seeing Oscar Kenney at [facility] to address soft tissue and flexibility. Zach Mike is from [place] and traveled a couple weekends ago. She had a PT treatment before she left and it was not as hard for her to sit in car as usual.  The pain is not localized and dull. She has been sitting for many years with her occupation. Denies hx of knee pain or back pain. Pain is mainly to lateral hips.         PMHx reveals:    was trying to work out at [employer] but has been having more issues with walking  Has been having issues of leg and hip pain bilateral for 2 yrs  Her mobility has become restricted to the point she is unable to cross her legs and one leg is higher  She has difficulty with squats and pain with prolonged sitting  Immobility  In her glutes, psoas. Adductors and hamstring and quads     Sent her for initial evaluation at [facility] and working on decrease in her pain and working on soft tissue      Now would like to pursue more PT thru [facility] as needs strengthening and conditioning as well        -Present symptoms/complaints (on day of evaluation)  Pain Scale:  · Current: 3/10  · Best: 3/10  · Worst:  7-8/10      Past Medical History/Comorbidities:   Ms. Marge Posey  has a past medical history of Abnormal finding on EKG; Chicken pox; Depression; Headache; Insomnia, transient (12/13/12); Measles; Mumps; Thyroid disease; and Uterine prolapse (12/13/12). Ms. Marge Posey  has a past surgical history that includes hx hysteroscopy with endometrial ablation (2002); hx appendectomy (age 12); hx lap cholecystectomy (~2013); hx splenectomy; and hx heent. Social History/Living Environment:     Luis Kasper works front office @ [employer]         Social History     Social History    Marital status:      Spouse name: N/A    Number of children: N/A    Years of education: N/A     Occupational History    Not on file. Social History Main Topics    Smoking status: Former Smoker     Packs/day: 0.25     Years: 10.00     Quit date: 2001    Smokeless tobacco: Never Used    Alcohol use Yes      Comment: 1-2 month    Drug use: No    Sexual activity: Yes     Partners: Male     Birth control/ protection: Surgical      Comment: vasectomy     Other Topics Concern    Not on file     Social History Narrative     Prior Level of Function/Work/Activity:  Independent.      Active Ambulatory Problems     Diagnosis Date Noted    Insomnia 11/17/2015    Perimenopausal 11/17/2015    Goiter 11/17/2015    Rosacea 11/17/2015    Postoperative hypothyroidism 03/30/2016    Hypercholesterolemia 03/30/2016    Prediabetes 03/27/2017    Multinodular goiter 02/01/2018    Muscle ache of extremity 04/30/2018    Essential hypertension 06/06/2018     Resolved Ambulatory Problems     Diagnosis Date Noted    No Resolved Ambulatory Problems     Past Medical History:   Diagnosis Date    Abnormal finding on EKG     Chicken pox     Depression     Headache     Insomnia, transient 12/13/12    Measles     Mumps     Thyroid disease     Uterine prolapse 12/13/12     Note: Patient denies any increase of symptoms with cough, sneeze or valsalva. Patient denies any saddle paresthesia or bowel/bladder deficits. Personal Factors:          Sex:  female        Age:  65 y.o. Current Medications:    Current Outpatient Prescriptions:     valACYclovir (VALTREX) 1 gram tablet, Take 1 Tab by mouth three (3) times daily. , Disp: 21 Tab, Rfl: 1    ibuprofen (MOTRIN) 800 mg tablet, Take 1 Tab by mouth every eight (8) hours. , Disp: 30 Tab, Rfl: 1    clonazePAM (KLONOPIN) 1 mg tablet, Take one tablet every evening., Disp: 30 Tab, Rfl: 5    losartan (COZAAR) 50 mg tablet, TAKE 1 TABLET DAILY FOR HYPERTENSION, Disp: 90 Tab, Rfl: 3    CINNAMON BARK-CHROMIUM PICOLIN PO, Take  by mouth., Disp: , Rfl:     LUTEIN PO, Take  by mouth., Disp: , Rfl:     MULTIVITAMIN WITH MINERALS (HAIR,SKIN AND NAILS PO), Take  by mouth., Disp: , Rfl:     L. ACID/L. CASEI/B.BIF/B.ARLYN/FOS (PROBIOTIC BLEND PO), Take  by mouth., Disp: , Rfl:     ASCORBATE CALCIUM (VITAMIN C PO), Take 1 Tab by mouth daily. Hold for surgery- last dose 2/6/17, Disp: , Rfl:     ZINC ACETATE PO, Take 1 Tab by mouth daily. Hold for surgery- last dose 2/6/17, Disp: , Rfl:     vitamin e 400 unit tab, Take 1 Tab by mouth daily.  Hold for surgery- last dose 2/6/17, Disp: , Rfl:     Biotin 2,500 mcg cap, Take 1 Tab by mouth daily. Hold for surgery- last dose 2/6/17, Disp: , Rfl:     Calcium-Cholecalciferol, D3, (CALCIUM 600 WITH VITAMIN D3) 600 mg(1,500mg) -400 unit cap, Take 1 Tab by mouth daily. Hold for surgery- last dose 2/6/17, Disp: , Rfl:     omega-3 fatty acids-vitamin e (FISH OIL) 1,000 mg cap, Take 1 Cap by mouth daily. Hold for surgery- last dose 2/6/17, Disp: , Rfl:      Date Last Reviewed:  8/7/2018   EXAMINATION:   Observation/Orthostatic Postural Assessment:          Tightness B hip flexors. Palpation:            ROM:            AROM/PROM         Joint: Eval Date: 6/19/18  Re-Assess Date: 8/7/18  Re-Assess Date:    Active ROM RIGHT LEFT RIGHT LEFT RIGHT LEFT   Knee Extension [facility] WF     Knee Flexion 130   deg 130 deg     Hip Flexion 40 deg  40 deg 50 deg 50 deg     Hip Abduction 10 DEG 20 DEG       Lumbar Flexion         Lumbar Extension         Lumbar Side-bending         Lumbar Rotation               IR 3O deg on Left, IR 36 deg on R  ER 20 degrees B      Strength:     Eval Date:  6/19/18  Re-Assess Date: 8/7/18  Re-Assess Date:      RIGHT LEFT RIGHT LEFT RIGHT LEFT   Knee Flexion  5/5 5/5 5/5 5/5     Knee Extension (L3, L4) 4/5 4/5 4+/5 5/5     Hip Flexion (L1, L2) 5/5 5/5 5/5 5/5     Hip Abduction (L5, S1) 4-/5 4-/5 4/5 4/5     Ankle Dorsiflexion  5/5 5/5 5/5 5/5     Great Toe Extension (L5) 5/5 5/5 5/5 5/5                  Single leg stance right/left: *Can perform, but fair ---Improved               Deep squat:  Very difficulty with poor flexibility    Ham 90/90:   RIGHT LE: 40   LEFT LE: 40    Special Tests:               JOSE=  + B              SLR (<60 degrees)= Negative              SLUMP=  Negative                                  SI Joint Tests: + [place], JOSE, Thigh Thrust, ASIS Distraction, Sacral Compression  Neurological Screen: t    RADIATING SYMPTOMS?: YES/NO--NO--Generalized pain B Hips.     Functional Mobility:  Affecting participation in basic ADLs and functional tasks. Balance:        Fair   Outcome Measure: Tool Used: Lower Extremity Functional Scale (LEFS)  Score:  Initial: 43/80 Most Recent: 51/80 (Date: 8.7.18 )   Interpretation of Score: 20 questions each scored on a 5 point scale with 0 representing \"extreme difficulty or unable to perform\" and 4 representing \"no difficulty\". The lower the score, the greater the functional disability. 80/80 represents no disability. Minimal detectable change is 9 points. Score 80 79-63 62-48 47-32 31-16 15-1 0   Modifier CH CI CJ CK CL CM CN       ? Mobility - Walking and Moving Around:     - CURRENT STATUS: CK - 40%-59% impaired, limited or restricted    - GOAL STATUS: CJ - 20%-39% impaired, limited or restricted    - D/C STATUS:  ---------------To be determined---------------      Tool Used: Modified Oswestry Low Back Pain Questionnaire  Score:  8.7.18 : 10/50  Most Recent: X/50 (Date: -- )   Interpretation of Score: Each section is scored on a 0-5 scale, 5 representing the greatest disability. The scores of each section are added together for a total score of 50. Score 0 1-10 11-20 21-30 31-40 41-49 50   Modifier CH CI CJ CK CL CM CN     ? Mobility - Walking and Moving Around:     - CURRENT STATUS: CI - 1%-19% impaired, limited or restricted    - GOAL STATUS: CI - 1%-19% impaired, limited or restricted    - D/C STATUS:  ---------------To be determined---------------      Medical Necessity:   · Skilled intervention continues to be required due to above deficits affecting participation in basic ADLs and overall functional tolerance. Reason for Services/Other Comments:  · Patient continues to require skilled intervention due to  above deficits affecting participation in basic ADLs and overall functional tolerance.    TREATMENT:   (In addition to Assessment/Re-Assessment sessions the following treatments were rendered)  THERAPEUTIC EXERCISE: ( minutes):  Exercises per grid below to improve mobility, strength and balance. Required minimal visual and verbal cues to promote proper body alignment and promote proper body posture. Progressed resistance, range and complexity of movement as indicated. Date:  *6/19/18 Date:  6/20/18   Date: 6/29/18   Date:  7/3/18 Date:  7/9/18 Date:  7/11/18 Date:  7/18/18 Date:  7/24/18 Date:  8/2/18 Date:  8/3/18 Date:  8/7/18   Activity/Exercise Parameters Parameters Parameters           LTR 10\"x10             IT Band 30\"x3             Hamstring Stretch 30\"x3  30\"x3 manual  30\"x3 manual  30\"x3 manual 30\"X3 manual 30\"X3 manual 30\"x3 manual  30\"x3 manual      Prone hip extension   10x 10x 2x10 2x10 2x10 2x10 2X10 On BOSU  plank 20x    Prone hip flexor stretch              Prone HSC  10x 10x           Hooklying hip abduction    Purple 2x10 Purple 2x10 Purple 2x10, adduction 20x        Bridge on ball     2x10 on ball 2x20 on ball        Standing hip extension and abduction     TB Blue 10x 4\" holds EACH TB Blue 10x 5\"        Quadruped       NV  Fire hydrant 20x B       RDL SL and Double leg        7# DL, 2# SL 10x e on 6\" box    10x at table height practicing technique       Stool Scoot       4x walking 4x walking on carpet      Shuttle       50# R LE, L LE 2X10       Bridge Rolling back          SL Bridge on BOSU 2x10    Plank          NV    Doorway stretch         30\"x3      Row on BOSU           Blue x 20    Side to Side TB in hallway with TB Around arms also           X pattern Green HEP 2x    SL Squat          NV    1/2 Knee Core Press Out          Blue foam blue band 2x10 E    1/2 Kneel Balance          NV    Open Book          10\"x10    Treadmill          5' backwards incline 6 2 mph. [employer]    MANUAL THERAPY: (39 minutes): Joint mobilization, Soft tissue mobilization and Manipulation was utilized and necessary because of the patient's restricted joint motion, painful spasm, restricted motion of soft tissue.    (Used abbreviations: MET - muscle energy technique; PNF - proprioceptive neuromuscular facilitation; NMR - neuromuscular re-education; AP - anterior to posterior; PA - posterior to anterior)      MANUAL stretching B HS, piriformis, hip flexor. Lateral glide with belt on GT4 bouts at 100 deg hip flexion and through hip flexion range. Inferior glide with mobilization of femur into hip flexion;  Grade IV mobs e 5 bouts L sacrum. MET anterior rotation R innominate. .  Manual traction supine. As well as LE long axis distraction B LE. Treatment/Session Assessment:  Jasiel Monsalve ready for strengthening and mobility work. Tightness exhibited today as she went to  and worked out. She is showing good compliance with home exercises---We are showing progress with regards to Oswestry and mm strength. .     · Pain/ Symptoms: Initial:   0-1/10   Post Session:  1/10 ·   Compliance with Program/Exercises: Will assess as treatment progresses. · Recommendations/Intent for next treatment session: \"Next visit will focus on advancements to more challenging activities\".     Future Appointments  Date Time Provider Fernanda Resendiz   8/9/2018 4:15 PM Ortiz Aaron DPT [facility]   9/4/2018 4:15 PM Ortiz Aaron DPT [facility]   9/6/2018 4:15 PM Ortiz Aaron DPT [facility]   9/11/2018 4:15 PM [facility]   9/13/2018 4:15 PM Ortiz Aaron DPT [facility]   9/20/2018 4:15 PM Ortiz Aaron DPT [facility]   9/21/2018 8:00 AM Ortiz Aaron DPT [facility]   9/27/2018 4:15 PM Ortiz Aaron DPT [facility]       Total Treatment Duration: 44'  PT Patient Time In/Time Out  Time In: 1600  Time Out: 200 Yunier Tan DPT

## 2018-08-09 ENCOUNTER — HOSPITAL ENCOUNTER (OUTPATIENT)
Dept: PHYSICAL THERAPY | Age: 58
Discharge: HOME | End: 2018-08-09
Attending: INTERNAL MEDICINE
Payer: COMMERCIAL

## 2018-08-09 PROCEDURE — 97110 THERAPEUTIC EXERCISES: CPT

## 2018-08-09 PROCEDURE — 97140 MANUAL THERAPY 1/> REGIONS: CPT

## 2018-08-09 NOTE — PROGRESS NOTES
Jen Cagle  : 1960      Payor: Eloina Patino / Plan: [place] [facility] / Product Type: PPO /  [address] [facility], [address] [facility], [id number] [place] [address]  [phone]   [phone]       OUTPATIENT PHYSICAL THERAPY:Daily Note 2018   Visit # 14     ICD-10: Treatment Diagnosis:    Low back pain (M54.5)        Bilateral leg pain [M79.604, M79.605]  Difficulty walking [R26.2]      Precautions/Allergies:   Lamisil [terbinafine]   Fall Risk Score: 0 (? 5 = High Risk)  MD Orders: Eval and Treat  MEDICAL/REFERRING DIAGNOSIS:  Bilateral leg pain   DATE OF ONSET: Chronic  REFERRING PHYSICIAN: Gregory Kulkarni MD  RETURN PHYSICIAN APPOINTMENT: TBD by Jen Cagle    8.7 Progress Assessment:  Jen Cagle has attended 13 sessions including initial evaluation. She reports fair compliance with stretching at home, but overalI do feel she is doing very well with regards to flexibility and strength. She has spent most of her work career in a chair which resulted in significant hip flexor tightness and glute weakness (lower crossed syndrome). She cannot cross leg at this time without assistance; however, the movement is more fluid and she is progressing towards goals. Sessions have incorporate strengthening as well as manual techniques. Note: Pain level has been 0/10 consistently over a few weeks. INITIAL ASSESSMENT:  Ms. Jen Cagle has attended 1 physical therapy session including initial evaluation. Jen Cagle presents with significant tightness to B hip internal rotators, and hip flexors. hamstrings and global LE (R > L). This is most likely due to her sitting at work and constantly in hip flexion/lower cross syndrome. Unable to perform JOSE L LE and requires much assist to get into position R LE. Strength additionally decreased as well as cardiopulmonary endurance.    She voices that she has sat for majority of her work life and does not stretch regularly at home. Ambulation 30 minutes on treadmill is not painful. Pain exacerbates with prolonged sitting (>1 hour). Anterior rotation of R innominate present. Colletta Ceo will benefit from skilled PT (medically necessary) to address above deficits affecting participation in basic ADLs and overall functional tolerance. Manual techniques (stretching, joint mobilizations, soft tissue mobilization/myofascial release), postural exercises/education, therapeutic techniques/activities, and HEP will be performed as appropriate addressing Sharyn Rea's current condition. PROBLEM LIST (Impacting functional limitations):  1. Decreased Strength  2. Decreased ADL/Functional Activities  3. Decreased Transfer Abilities  4. Decreased Ambulation Ability/Technique  5. Decreased Balance  6. Increased Pain  7. Decreased Activity Tolerance  8. Increased Fatigue  9. Increased Shortness of Breath  10. Decreased Flexibility/Joint Mobility  11. Decreased [place] with Home Exercise Program INTERVENTIONS PLANNED:  1. Balance Exercise  2. Bed Mobility  3. Cold  4. Cryotherapy  5. Electrical Stimulation  6. Family Education  7. Gait Training  8. Heat  9. Home Exercise Program (HEP)  10. Manual Therapy  11. Neuromuscular Re-education/Strengthening  12. Range of Motion (ROM)  13. Therapeutic Activites  14. Therapeutic Exercise/Strengthening  15. Transfer Training   TREATMENT PLAN:  Effective Dates: 6.19.18 TO 9/17/2018 (90 days). Frequency/Duration: 2-3 times a week for 90 Days  GOALS: (Goals have been discussed and agreed upon with patient.)  Short Term Goals 4 weeks Assessed 8.7.18  1. Colletta Ceo will be independent with HEP   Goal Met 8.7.18  2. Colletta Ceo will participate in LE stretching program to increase flexibility Goal Met 8.7.18  3.  Colletta Ceo will participate in core stabilization exercises to help with stabilization during ADLs Goal Met 8.7.18  4. Osman Moses will participate in LE strengthening program with weights as appropriate to help with gait and elevations Goal Met 8.7.18  5. Osman Moses will participate in static and dynamic balance activities to decrease the risk for falls Goal Met 8.7.18  6. Osman Moses will tolerate manual therapy/joint mobilizations/soft tissue to increase ROM and decrease pain Goal Met 8.7.18  7. Osman Moses will be able to cross her legs with minimal difficulty and no pain. Continue--still limited  8. Osman Moses will be able to get in and out of the car with minimal to no difficulty. Improved   9. Osman Moses pelvis will stay level with regards to innominate position. Goal Met 8.7.18    Long Term Goals 8 weeks Assessed 8.7.18  1. Osman Moses will demonstrate a 8 point improvement on the Oswestry to show improvement in function Goal Met 8.7.18  2. Osman Moses will report 0/10 pain at rest and during ADLs Goal Met 8.7.18  3. Osman Moses will demonstrate 5/5 LE strength on manual muscle testing Goal Met 8.7.18  4. Osman Moses will be able to perform SLS >5 seconds bilaterally to help with gait and improve balance  Goal Met 8.7.18              HISTORY:   History of Present Injury/Illness (Reason for Referral): Osman Moses cannot cross her legs anymore and she has pain in her legs. She has increased stiffness. She talked with MD and explained she cannot cross her legs. She gets pain with crossing legs. She's been seeing Tanner Gary at [facility] to address soft tissue and flexibility. Osman Moses is from [place] and traveled a couple weekends ago. She had a PT treatment before she left and it was not as hard for her to sit in car as usual.  The pain is not localized and dull. She has been sitting for many years with her occupation. Denies hx of knee pain or back pain. Pain is mainly to lateral hips.         PMHx reveals:    was trying to work out at [employer] but has been having more issues with walking  Has been having issues of leg and hip pain bilateral for 2 yrs  Her mobility has become restricted to the point she is unable to cross her legs and one leg is higher  She has difficulty with squats and pain with prolonged sitting  Immobility  In her glutes, psoas. Adductors and hamstring and quads     [facility] and working on decrease in her pain and working on soft tissue      Now would like to pursue more PT thru [facility] as needs strengthening and conditioning as well        -Present symptoms/complaints (on day of evaluation)  Pain Scale:  · Current: 3/10  · Best: 3/10  · Worst:  7-8/10      Past Medical History/Comorbidities:   Ms. Ignacio Guerrier  has a past medical history of Abnormal finding on EKG; Chicken pox; Depression; Headache; Insomnia, transient (12/13/12); Measles; Mumps; Thyroid disease; and Uterine prolapse (12/13/12). Ms. Ignacio Guerrier  has a past surgical history that includes hx hysteroscopy with endometrial ablation (2002); hx appendectomy (age 12); hx lap cholecystectomy (~2013); hx splenectomy; and hx heent. Social History/Living Environment:     Michelle Bowen works front office @ [employer]         Social History     Social History    Marital status:      Spouse name: N/A    Number of children: N/A    Years of education: N/A     Occupational History    Not on file. Social History Main Topics    Smoking status: Former Smoker     Packs/day: 0.25     Years: 10.00     Quit date: 2001    Smokeless tobacco: Never Used    Alcohol use Yes      Comment: 1-2 month    Drug use: No    Sexual activity: Yes     Partners: Male     Birth control/ protection: Surgical      Comment: vasectomy     Other Topics Concern    Not on file     Social History Narrative     Prior Level of Function/Work/Activity:  Independent.      Active Ambulatory Problems     Diagnosis Date Noted    Insomnia 11/17/2015    Perimenopausal 11/17/2015    Goiter 11/17/2015    Rosacea 11/17/2015    Postoperative hypothyroidism 03/30/2016    Hypercholesterolemia 03/30/2016    Prediabetes 03/27/2017    Multinodular goiter 02/01/2018    Muscle ache of extremity 04/30/2018    Essential hypertension 06/06/2018     Resolved Ambulatory Problems     Diagnosis Date Noted    No Resolved Ambulatory Problems     Past Medical History:   Diagnosis Date    Abnormal finding on EKG     Chicken pox     Depression     Headache     Insomnia, transient 12/13/12    Measles     Mumps     Thyroid disease     Uterine prolapse 12/13/12     Note: Patient denies any increase of symptoms with cough, sneeze or valsalva. Patient denies any saddle paresthesia or bowel/bladder deficits. Personal Factors:          Sex:  female        Age:  65 y.o. Current Medications:    Current Outpatient Prescriptions:     valACYclovir (VALTREX) 1 gram tablet, Take 1 Tab by mouth three (3) times daily. , Disp: 21 Tab, Rfl: 1    ibuprofen (MOTRIN) 800 mg tablet, Take 1 Tab by mouth every eight (8) hours. , Disp: 30 Tab, Rfl: 1    clonazePAM (KLONOPIN) 1 mg tablet, Take one tablet every evening., Disp: 30 Tab, Rfl: 5    losartan (COZAAR) 50 mg tablet, TAKE 1 TABLET DAILY FOR HYPERTENSION, Disp: 90 Tab, Rfl: 3    CINNAMON BARK-CHROMIUM PICOLIN PO, Take  by mouth., Disp: , Rfl:     LUTEIN PO, Take  by mouth., Disp: , Rfl:     MULTIVITAMIN WITH MINERALS (HAIR,SKIN AND NAILS PO), Take  by mouth., Disp: , Rfl:     L. ACID/L. CASEI/B.BIF/B.ARLYN/FOS (PROBIOTIC BLEND PO), Take  by mouth., Disp: , Rfl:     ASCORBATE CALCIUM (VITAMIN C PO), Take 1 Tab by mouth daily. Hold for surgery- last dose 2/6/17, Disp: , Rfl:     ZINC ACETATE PO, Take 1 Tab by mouth daily. Hold for surgery- last dose 2/6/17, Disp: , Rfl:     vitamin e 400 unit tab, Take 1 Tab by mouth daily.  Hold for surgery- last dose 2/6/17, Disp: , Rfl:     Biotin 2,500 mcg cap, Take 1 Tab by mouth daily. Hold for surgery- last dose 2/6/17, Disp: , Rfl:     Calcium-Cholecalciferol, D3, (CALCIUM 600 WITH VITAMIN D3) 600 mg(1,500mg) -400 unit cap, Take 1 Tab by mouth daily. Hold for surgery- last dose 2/6/17, Disp: , Rfl:     omega-3 fatty acids-vitamin e (FISH OIL) 1,000 mg cap, Take 1 Cap by mouth daily. Hold for surgery- last dose 2/6/17, Disp: , Rfl:      Date Last Reviewed:  8/9/2018   EXAMINATION:   Observation/Orthostatic Postural Assessment:          Tightness B hip flexors. Palpation:            ROM:            AROM/PROM         Joint: Eval Date: 6/19/18  Re-Assess Date: 8/7/18  Re-Assess Date:    Active ROM RIGHT LEFT RIGHT LEFT RIGHT LEFT   Knee Extension [facility]     Knee Flexion 130   deg 130 deg     Hip Flexion 40 deg  40 deg 50 deg 50 deg     Hip Abduction 10 DEG 20 DEG       Lumbar Flexion         Lumbar Extension         Lumbar Side-bending         Lumbar Rotation               IR 3O deg on Left, IR 36 deg on R  ER 20 degrees B      Strength:     Eval Date:  6/19/18  Re-Assess Date: 8/7/18  Re-Assess Date:      RIGHT LEFT RIGHT LEFT RIGHT LEFT   Knee Flexion  5/5 5/5 5/5 5/5     Knee Extension (L3, L4) 4/5 4/5 4+/5 5/5     Hip Flexion (L1, L2) 5/5 5/5 5/5 5/5     Hip Abduction (L5, S1) 4-/5 4-/5 4/5 4/5     Ankle Dorsiflexion  5/5 5/5 5/5 5/5     Great Toe Extension (L5) 5/5 5/5 5/5 5/5                  Single leg stance right/left: *Can perform, but fair ---Improved               Deep squat:  Very difficulty with poor flexibility    Ham 90/90:   RIGHT LE: 40   LEFT LE: 40    Special Tests:               JOSE=  + B              SLR (<60 degrees)= Negative              SLUMP=  Negative                                  SI Joint Tests: + [place], JOSE, Thigh Thrust, ASIS Distraction, Sacral Compression  Neurological Screen: t    RADIATING SYMPTOMS?: YES/NO--NO--Generalized pain B Hips.     Functional Mobility:  Affecting participation in basic ADLs and functional tasks. Balance:        Fair   Outcome Measure: Tool Used: Lower Extremity Functional Scale (LEFS)  Score:  Initial: 43/80 Most Recent: 51/80 (Date: 8.7.18 )   Interpretation of Score: 20 questions each scored on a 5 point scale with 0 representing \"extreme difficulty or unable to perform\" and 4 representing \"no difficulty\". The lower the score, the greater the functional disability. 80/80 represents no disability. Minimal detectable change is 9 points. Score 80 79-63 62-48 47-32 31-16 15-1 0   Modifier CH CI CJ CK CL CM CN       ? Mobility - Walking and Moving Around:     - CURRENT STATUS: CK - 40%-59% impaired, limited or restricted    - GOAL STATUS: CJ - 20%-39% impaired, limited or restricted    - D/C STATUS:  ---------------To be determined---------------      Tool Used: Modified Oswestry Low Back Pain Questionnaire  Score:  8.7.18 : 10/50  Most Recent: X/50 (Date: -- )   Interpretation of Score: Each section is scored on a 0-5 scale, 5 representing the greatest disability. The scores of each section are added together for a total score of 50. Score 0 1-10 11-20 21-30 31-40 41-49 50   Modifier CH CI CJ CK CL CM CN     ? Mobility - Walking and Moving Around:     - CURRENT STATUS: CI - 1%-19% impaired, limited or restricted    - GOAL STATUS: CI - 1%-19% impaired, limited or restricted    - D/C STATUS:  ---------------To be determined---------------      Medical Necessity:   · Skilled intervention continues to be required due to above deficits affecting participation in basic ADLs and overall functional tolerance. Reason for Services/Other Comments:  · Patient continues to require skilled intervention due to  above deficits affecting participation in basic ADLs and overall functional tolerance.    TREATMENT:   (In addition to Assessment/Re-Assessment sessions the following treatments were rendered)  THERAPEUTIC EXERCISE: (29 minutes):  Exercises per grid below to improve mobility, strength and balance. Required minimal visual and verbal cues to promote proper body alignment and promote proper body posture. Progressed resistance, range and complexity of movement as indicated. Date:  *6/19/18 Date:  6/20/18   Date: 6/29/18   Date:  7/3/18 Date:  7/9/18 Date:  7/11/18 Date:  7/18/18 Date:  7/24/18 Date:  8/2/18 Date:  8/3/18 Date:  8/7/18 Date:  8/9/18   Activity/Exercise Parameters Parameters Parameters            LTR 10\"x10              IT Band 30\"x3              Hamstring Stretch 30\"x3  30\"x3 manual  30\"x3 manual  30\"x3 manual 30\"X3 manual 30\"X3 manual 30\"x3 manual  30\"x3 manual       Prone hip extension   10x 10x 2x10 2x10 2x10 2x10 2X10 On BOSU  plank 20x     Prone hip flexor stretch               Prone HSC  10x 10x            Hooklying hip abduction    Purple 2x10 Purple 2x10 Purple 2x10, adduction 20x         Bridge on ball     2x10 on ball 2x20 on ball         Standing hip extension and abduction     TB Blue 10x 4\" holds EACH TB Blue 10x 5\"         Quadruped       NV  Fire hydrant 20x B        RDL SL and Double leg        7# DL, 2# SL 10x e on 6\" box    10x at table height practicing technique        Stool Scoot       4x walking 4x walking on carpet       Shuttle       50# R LE, L LE 2X10        Bridge Rolling back          SL Bridge on BOSU 2x10     Plank          NV     Doorway stretch         30\"x3       Row on BOSU           Blue x 20     Side to Side TB in hallway with TB Around arms also           X pattern Green HEP 2x     SL Squat          NV     1/2 Knee Core Press Out          Blue foam blue band 2x10 E     1/2 Kneel Balance          NV     Open Book          10\"x10     Treadmill          5' backwards incline 6 2 mph.      Side Step TB               Heel Raises Seated            25x   Toe Raises Seated            25x   Single knee to chest            30\"x3   Piriformis seated            30\"x3   Quad stretch standing            30\"x3    Education on [employer] rolling onto glute            5'   L/S Extensor Stretch seated with rotation and lateral to side            30\"X3      Med[employer] Portal    MANUAL THERAPY: (10 minutes): Joint mobilization, Soft tissue mobilization and Manipulation was utilized and necessary because of the patient's restricted joint motion, painful spasm, restricted motion of soft tissue. (Used abbreviations: MET - muscle energy technique; PNF - proprioceptive neuromuscular facilitation; NMR - neuromuscular re-education; AP - anterior to posterior; PA - posterior to anterior)      MANUAL stretching B HS, piriformis, hip flexor. Lateral glide with belt on GT4 bouts at 100 deg hip flexion and through hip flexion range (NOT TODAY)  Inferior glide with mobilization of femur into hip flexion;  (NOT TODAY)  Grade IV mobs e 5 bouts L sacrum. (NOT TODAY)  MET anterior rotation R innominate. . (NOT TODAY)  Manual traction supine  LE long axis distraction B LE. Treatment/Session Assessment:  Colletta Ceo was shown beneficial stretches and activities for airplane. She will not be seen until after her flight to [facility]. She shows better ability to pull knee to chest and externally rotate femur as compared to previous sessions. Additionally manual techniques end of session to decrease stiffness. I provided her with lacrosse ball and showed her ways to improve trigger points/mm release. · Pain/ Symptoms: Initial:   0-1/10   Post Session:  1/10 ·   Compliance with Program/Exercises: Will assess as treatment progresses. · Recommendations/Intent for next treatment session: \"Next visit will focus on advancements to more challenging activities\".     Future Appointments  Date Time Provider Fernanda Resendiz   8/9/2018 4:15 PM Akhil Edwards DPT [facility]   9/4/2018 4:15 PM Akhil Edwards DPT [facility]   9/6/2018 4:15 PM Akhil Edwards DPT [facility]   9/11/2018 4:15 PM Akhil Edwards DPT [facility]   9/13/2018 4:15 PM Mearl Cranker, DPT [facility]   9/20/2018 4:15 PM Mearl Cranker, DPT [facility]   9/21/2018 8:00 AM Mearl Cranker, DPT [facility]   9/27/2018 4:15 PM [facility]       Total Treatment Duration: 44'  PT Patient Time In/Time Out  Time In: 1600  Time Out: Ace Rollins DPT

## 2018-08-13 ENCOUNTER — APPOINTMENT (OUTPATIENT)
Dept: PHYSICAL THERAPY | Age: 58
End: 2018-08-13
Attending: INTERNAL MEDICINE
Payer: COMMERCIAL

## 2018-09-04 ENCOUNTER — HOSPITAL ENCOUNTER (OUTPATIENT)
Dept: PHYSICAL THERAPY | Age: 58
Discharge: HOME | End: 2018-09-04
Attending: INTERNAL MEDICINE
Payer: COMMERCIAL

## 2018-09-04 PROCEDURE — 97140 MANUAL THERAPY 1/> REGIONS: CPT

## 2018-09-04 NOTE — PROGRESS NOTES
Arcenio Marcelo : 1960 Payor: Shiela Miramontes / Plan: [facility] / Product Type: PPO /  [id number] at [facility] [address] [facility], [address] [facility] [place], [facility], [address] [place] [address] [phone]   [phone] OUTPATIENT PHYSICAL THERAPY:Progress Report 2018   Visit # 15 ICD-10: Treatment Diagnosis: Low back pain (M54.5) Bilateral leg pain [M79.604, M79.605] Difficulty walking [R26.2] Precautions/Allergies:  
Lamisil [terbinafine] Fall Risk Score: 0 (? 5 = High Risk) MD Orders: Eval and Treat  MEDICAL/REFERRING DIAGNOSIS: 
Bilateral leg pain DATE OF ONSET: Chronic REFERRING PHYSICIAN: Mecca Huff MD 
RETURN PHYSICIAN APPOINTMENT: TBD by Arcenio Marcelo 18 Arcenio Marcelo has attended 15 sessions including initial evaluation. She was not seen for about a month as she and her family have been traveling. She has indicated that she feels better when she stretches and is honest about her poor compliance with exercises at home. We have been seeing her for quite some time and at this point I do feel as though she is reaching plateau with regards to flexibility. PT sessions have been able to \"loosen her up\" and improve pain with manual stretching; however, this is temporary--tightness returns as she is not stretching regularly at home. Consistent stretching and home strengthening can be performed at home. .. Over the next visit I will review and then encourage her to continue these stretches at home. Arcenio Marcelo will benefit from skilled PT (medically necessary) to address above deficits affecting participation in basic ADLs and overall functional tolerance.   Manual techniques (stretching, joint mobilizations, soft tissue mobilization/myofascial release), postural exercises/education, therapeutic techniques/activities, and HEP will be performed as appropriate addressing Patrick Rea's current condition. PROBLEM LIST (Impacting functional limitations): 1. Decreased Strength 2. Decreased ADL/Functional Activities 3. Decreased Transfer Abilities 4. Decreased Ambulation Ability/Technique 5. Decreased Balance 6. Increased Pain 7. Decreased Activity Tolerance 8. Increased Fatigue 9. Increased Shortness of Breath 10. Decreased Flexibility/Joint Mobility 11. Decreased [place] with Home Exercise Program INTERVENTIONS PLANNED: 
1. Balance Exercise 2. Bed Mobility 3. Cold 4. Cryotherapy 5. Electrical Stimulation 6. Family Education 7. Gait Training 8. Heat 9. Home Exercise Program (HEP) 10. Manual Therapy 11. Neuromuscular Re-education/Strengthening 12. Range of Motion (ROM) 13. Therapeutic Activites 14. Therapeutic Exercise/Strengthening 15. Transfer Training TREATMENT PLAN: 
Effective Dates: 6.19.18 TO 9/17/2018 (90 days). Frequency/Duration: 2-3 times a week for 90 Days GOALS: (Goals have been discussed and agreed upon with patient.) Short Term Goals 4 weeks Assessed 8.7.18; 9.4.18 
1. Arcenio Marcelo will be independent with HEP   Goal Met 8.7.18; 9.4.18 
2. Arcenio Marcelo will participate in LE stretching program to increase flexibility Goal Met 8.7.18; 9.4.18 3. Arcenio Marcelo will participate in core stabilization exercises to help with stabilization during ADLs Goal Met 8.7.18; 9.4.18 4. Arcenio Marcelo will participate in LE strengthening program with weights as appropriate to help with gait and elevations Goal Met 8.7.18; 9.4.18 5. Arcenio Marcelo will participate in static and dynamic balance activities to decrease the risk for falls Goal Met 8.7.18; 9.4.18 6. Arcenio Marcelo will tolerate manual therapy/joint mobilizations/soft tissue to increase ROM and decrease pain Goal Met 8.7.18; 9.4.18 7.  Arcenio Marcelo will be able to cross her legs with minimal difficulty and no pain. Continue--still limited 8. Arcenio Marcelo will be able to get in and out of the car with minimal to no difficulty. Improved 9. Arcenio Marcelo pelvis will stay level with regards to innominate position. Goal Met 8.7.18; 9.4.18 Long Term Goals 8 weeks Assessed 8.7.18; 9.4.18 
1. Arcenio Marcelo will demonstrate a 8 point improvement on the Oswestry to show improvement in function Goal Met 8.7.18; 9.4.18 
2. Arcenio Marcelo will report 0/10 pain at rest and during ADLs Goal Met 8.7.18; 9.4.18 3. Arcenio Marcelo will demonstrate 5/5 LE strength on manual muscle testing Goal Met 8.7.18; 9.4.18 4. Arcenio Marcelo will be able to perform SLS >5 seconds bilaterally to help with gait and improve balance  Goal Met 8.7.18; 9.4.18 HISTORY:  
History of Present Injury/Illness (Reason for Referral): Arcenio Marcelo cannot cross her legs anymore and she has pain in her legs. She has increased stiffness. She talked with MD and explained she cannot cross her legs. She gets pain with crossing legs. She's been seeing Ofelia Holstein at [facility] to address soft tissue and flexibility. Arcenio Marcel[place] is from [place] and traveled a couple weekends ago. She had a PT treatment before she left and it was not as hard for her to sit in car as usual.  The pain is not localized and dull. She has been sitting for many years with her occupation. Denies hx of knee pain or back pain. Pain is mainly to lateral hips. PMHx reveals: 
 
was trying to work out at [employer] but has been having more issues with walking Has been having issues of leg and hip pain bilateral for 2 yrs Her mobility has become restricted to the point she is unable to cross her legs and one leg is higher She has difficulty with squats and pain with prolonged sitting Immobility  In her glutes, psoas.  Adductors and hamstring and quads 
  
Sent her for initial evaluation at [facility] and working on decrease in her pain and working on soft tissue  
  
Now would like to pursue more PT thru [facility] as needs strengthening and conditioning as well 
 
 
 
-Present symptoms/complaints (on day of evaluation) Pain Scale: · Current: 3/10 · Best: 3/10 · Worst:  7-8/10 Past Medical History/Comorbidities: Ms. Dahiana Reese  has a past medical history of Abnormal finding on EKG; Chicken pox; Depression; Headache; Insomnia, transient (12/13/12); Measles; Mumps; Thyroid disease; and Uterine prolapse (12/13/12). Ms. Dahiana Reese  has a past surgical history that includes hx hysteroscopy with endometrial ablation (2002); hx appendectomy (age 12); hx lap cholecystectomy (~2013); hx splenectomy; and hx heent. Social History/Living Environment:  
 
Emeli Adams works front office @ [employer] Social History Social History  Marital status:  Spouse name: N/A  
 Number of children: N/A  
 Years of education: N/A Occupational History  Not on file. Social History Main Topics  Smoking status: Former Smoker Packs/day: 0.25 Years: 10.00 Quit date: 2001  Smokeless tobacco: Never Used  Alcohol use Yes Comment: 1-2 month  Drug use: No  
 Sexual activity: Yes  
  Partners: Male Birth control/ protection: Surgical  
   Comment: vasectomy Other Topics Concern  Not on file Social History Narrative Prior Level of Function/Work/Activity: 
Independent. Active Ambulatory Problems Diagnosis Date Noted  Insomnia 11/17/2015  Perimenopausal 11/17/2015  Goiter 11/17/2015  Rosacea 11/17/2015  Postoperative hypothyroidism 03/30/2016  Hypercholesterolemia 03/30/2016  Prediabetes 03/27/2017  Multinodular goiter 02/01/2018  Muscle ache of extremity 04/30/2018  Essential hypertension 06/06/2018 Resolved Ambulatory Problems Diagnosis Date Noted  No Resolved Ambulatory Problems Past Medical History: Diagnosis Date  Abnormal finding on EKG  Chicken pox  Depression  Headache  Insomnia, transient 12/13/12  Measles  Mumps  Thyroid disease  Uterine prolapse 12/13/12 Note: Patient denies any increase of symptoms with cough, sneeze or valsalva. Patient denies any saddle paresthesia or bowel/bladder deficits. Personal Factors:   
      Sex:  female Age:  63 y.o. Current Medications:   
Current Outpatient Prescriptions:  
  ibuprofen (MOTRIN) 800 mg tablet, TAKE 1 TABLET BY MOUTH EVERY 8 HOURS, Disp: 30 Tab, Rfl: 1   valACYclovir (VALTREX) 1 gram tablet, Take 1 Tab by mouth three (3) times daily. , Disp: 21 Tab, Rfl: 1   clonazePAM (KLONOPIN) 1 mg tablet, Take one tablet every evening., Disp: 30 Tab, Rfl: 5 
  losartan (COZAAR) 50 mg tablet, TAKE 1 TABLET DAILY FOR HYPERTENSION, Disp: 90 Tab, Rfl: 3 
  CINNAMON BARK-CHROMIUM PICOLIN PO, Take  by mouth., Disp: , Rfl:  
  LUTEIN PO, Take  by mouth., Disp: , Rfl:  
  MULTIVITAMIN WITH MINERALS (HAIR,SKIN AND NAILS PO), Take  by mouth., Disp: , Rfl:  
  L. ACID/L. CASEI/B.BIF/B.ARLYN/FOS (PROBIOTIC BLEND PO), Take  by mouth., Disp: , Rfl:   ASCORBATE CALCIUM (VITAMIN C PO), Take 1 Tab by mouth daily. Hold for surgery- last dose 2/6/17, Disp: , Rfl:  
  ZINC ACETATE PO, Take 1 Tab by mouth daily. Hold for surgery- last dose 2/6/17, Disp: , Rfl:  
  vitamin e 400 unit tab, Take 1 Tab by mouth daily. Hold for surgery- last dose 2/6/17, Disp: , Rfl:   Biotin 2,500 mcg cap, Take 1 Tab by mouth daily. Hold for surgery- last dose 2/6/17, Disp: , Rfl:  
  Calcium-Cholecalciferol, D3, (CALCIUM 600 WITH VITAMIN D3) 600 mg(1,500mg) -400 unit cap, Take 1 Tab by mouth daily. Hold for surgery- last dose 2/6/17, Disp: , Rfl:  
  omega-3 fatty acids-vitamin e (FISH OIL) 1,000 mg cap, Take 1 Cap by mouth daily. Hold for surgery- last dose 2/6/17, Disp: , Rfl:   
 
Date Last Reviewed:  9/6/2018 EXAMINATION:  
 Observation/Orthostatic Postural Assessment:   
      Tightness B hip flexors. Palpation:   
       
ROM:   
       
AROM/PROM Joint: Eval Date: 6/19/18  Re-Assess Date: 8/7/18 and 9/4/18  Re-Assess Date: 5 Active ROM RIGHT LEFT RIGHT LEFT RIGHT LEFT Knee Extension [facility] Knee Flexion 130   deg 130 deg Hip Flexion 40 deg  40 deg 50 deg 50 deg Hip Abduction 10 DEG 20 DEG 10 deg 20 deg Lumbar Flexion   ER limited --can only put L ankle to bottom of R patella. ER limited, but can get R foot to top of L knee. Lumbar Extension Lumbar Side-bending Lumbar Rotation IR 3O deg on Left, IR 36 deg on R 
ER 20 degrees B Strength:   
 Eval Date:  6/19/18  Re-Assess Date: 8/7/18 and 9/4/18  Re-Assess Date:   
  RIGHT LEFT RIGHT LEFT RIGHT LEFT Knee Flexion  5/5 5/5 5/5 5/5 Knee Extension (L3, L4) 4/5 4/5 4+/5 5/5 Hip Flexion (L1, L2) 5/5 5/5 5/5 5/5 Hip Abduction (L5, S1) 4-/5 4-/5 4/5 4/5 Ankle Dorsiflexion  5/5 5/5 5/5 5/5 Great Toe Extension (L5) 5/5 5/5 5/5 5/5 Single leg stance right/left: *Can perform, but fair ---Improved             Deep squat:  Very difficulty with poor flexibility Ham 90/90:  
 RIGHT LE: 40----50 deg LEFT LE: 40----50 deg Special Tests: JOSE=  + B 
            SLR (<60 degrees)= Negative SLUMP=  Negative SI Joint Tests: + [place], JOSE, Thigh Thrust, ASIS Distraction, Sacral Compression Neurological Screen: t  
 RADIATING SYMPTOMS?: YES/NO--NO--Generalized pain B Hips. Functional Mobility:  Affecting participation in basic ADLs and functional tasks. Balance:   
    Fair Outcome Measure: Tool Used: Lower Extremity Functional Scale (LEFS) Score:  Initial: 43/80 Most Recent: 57/80 (Date: 9/4.18 ) Interpretation of Score: 20 questions each scored on a 5 point scale with 0 representing \"extreme difficulty or unable to perform\" and 4 representing \"no difficulty\". The lower the score, the greater the functional disability. 80/80 represents no disability. Minimal detectable change is 9 points. Score 80 79-63 62-48 47-32 31-16 15-1 0 Modifier CH CI CJ CK CL CM CN  
 
 
? Mobility - Walking and Moving Around:  
  - CURRENT STATUS: CJ - 20%-39% impaired, limited or restricted  - GOAL STATUS: CJ - 20%-39% impaired, limited or restricted  - D/C STATUS:  ---------------To be determined--------------- Tool Used: Modified Oswestry Low Back Pain Questionnaire Score:  8.7.18 : 10/50  Most Recent: X/50 (Date: -- ) Interpretation of Score: Each section is scored on a 0-5 scale, 5 representing the greatest disability. The scores of each section are added together for a total score of 50. Score 0 1-10 11-20 21-30 31-40 41-49 50 Modifier CH CI CJ CK CL CM CN  
 
? Mobility - Walking and Moving Around:  
  - CURRENT STATUS: CI - 1%-19% impaired, limited or restricted  - GOAL STATUS: CI - 1%-19% impaired, limited or restricted  - D/C STATUS:  ---------------To be determined--------------- Medical Necessity:  
· Skilled intervention continues to be required due to above deficits affecting participation in basic ADLs and overall functional tolerance. Reason for Services/Other Comments: 
· Patient continues to require skilled intervention due to  above deficits affecting participation in basic ADLs and overall functional tolerance. TREATMENT:  
(In addition to Assessment/Re-Assessment sessions the following treatments were rendered) THERAPEUTIC EXERCISE: (- minutes):  Exercises per grid below to improve mobility, strength and balance. Required minimal visual and verbal cues to promote proper body alignment and promote proper body posture. Progressed resistance, range and complexity of movement as indicated. Date: 
*6/19/18 Date: 6/20/18 Date: 6/29/18 Date: 
7/3/18 Date: 
7/9/18 Date: 
7/11/18 Date: 
7/18/18 Date: 
7/24/18 Date: 
8/2/18 Date: 
8/3/18 Date: 
8/7/18 Date: 
8/9/18 Date: 
9/4/18 Activity/Exercise Parameters Parameters Parameters LTR 10\"x10 IT Band 30\"x3 Hamstring Stretch 30\"x3  30\"x3 manual  30\"x3 manual  30\"x3 manual 30\"X3 manual 30\"X3 manual 30\"x3 manual  30\"x3 manual       
Prone hip extension   10x 10x 2x10 2x10 2x10 2x10 2X10 On BOSU  plank 20x Prone hip flexor stretch Prone HSC  10x 10x Hooklying hip abduction    Purple 2x10 Purple 2x10 Purple 2x10, adduction 20x Bridge on ball     2x10 on ball 2x20 on ball Standing hip extension and abduction     TB Blue 10x 4\" holds EACH TB Blue 10x 5\" Luis Enriuqe Pump NV  Fire hydrant 20x B        
RDL SL and Double leg        7# DL, 2# SL 10x e on 6\" box 10x at table height practicing technique Stool Scoot       4x walking 4x walking on carpet Shuttle       50# R LE, L LE 2X10 Bridge Rolling back          [employer] on BOSU 2x10 Plank          NV Doorway stretch         30\"x3 Row on BOSU           Blue x 20 Side to Side TB in hallway with TB Around arms also           X pattern Green HEP 2x SL Squat          NV     
1/2 Knee Core Press Out          Blue foam blue band 2x10 E     
1/2 Kneel Balance          NV Open Book          10\"x10 Treadmill          5' backwards incline 6 2 mph. Side Step TB Heel Raises Seated            25x Toe Raises Seated            25x Single knee to chest            30\"x3 Piriformis seated            30\"x3 Quad stretch standing            30\"x3 Education on [employer] onto glute            5'   
L/S Extensor Stretch seated with rotation and lateral to side            30\"X3 [employer] 
 
 MANUAL THERAPY: (40 minutes): Joint mobilization, Soft tissue mobilization and Manipulation was utilized and necessary because of the patient's restricted joint motion, painful spasm, restricted motion of soft tissue. (Used abbreviations: MET - muscle energy technique; PNF - proprioceptive neuromuscular facilitation; NMR - neuromuscular re-education; AP - anterior to posterior; PA - posterior to anterior) MANUAL stretching B HS, piriformis, hip flexor. Lateral glide with belt on GT4 bouts at 100 deg hip flexion and through hip flexion range (NOT TODAY) Inferior glide with mobilization of femur into hip flexion;  (NOT TODAY) Grade IV mobs e 5 bouts L sacrum. MET anterior rotation R innominate. Nidhi Glenn Manual traction supine LE long axis distraction B LE. Hip quadrant Grade III through range with ER oscillations 4 bouts. Treatment/Session Assessment:  Aidee Elliott has not been seen since 8/8 as she went to [facility] for a few weeks. She was in definite need of stretching and manual all of which we performed today. · Pain/ Symptoms: Initial:   0-1/10   Post Session:  1/10 · Compliance with Program/Exercises: Will assess as treatment progresses. · Recommendations/Intent for next treatment session: \"Next visit will focus on advancements to more challenging activities\". Future Appointments Date Time Provider Fernanda Resendiz 9/6/2018 4:15 PM Liza Gomez, DPT [facility]  
9/11/2018 4:15 PM Liza Gomez, DPT [facility]  
9/13/2018 4:15 PM Lizayecenia Gomez, DPT [facility]  
9/20/2018 4:15 PM Liza Petersmo, DPT [facility]  
9/21/2018 8:00 AM Liza Petersmo, DPT [facility]  
9/27/2018 4:15 PM Lizayecenia Petersmo, DPT [facility]  
10/1/2018 4:15 PM Lizayecenia Gomez, DPT [facility]  
10/3/2018 4:15 PM Liza Gomez, DPT [facility]  
10/9/2018 4:15 PM Liza Gomez, [facility]PT [facility] 10/11/2018 4:15 PM Carrafie Jerry, DPT [facility]  
10/16/2018 4:15 PM Carrafie Jerry, DPT [facility]  
10/18/2018 4:15 PM Carrafie Jerry, DPT [facility]  
10/23/2018 4:15 PM Carrafie Jerry, DPT [facility]  
10/25/2018 4:15 PM Akhil Edwards, DPT [facility]  
10/29/2018 4:15 PM Carrafie Jerry, DPT [facility]  
10/31/2018 4:15 PM Carrafie Jerry, DPT [facility] Total Treatment Duration: 36' PT Patient Time In/Time Out Time In: 0253 Time Out: 1700 Akhil Edwards DPT

## 2018-09-06 ENCOUNTER — HOSPITAL ENCOUNTER (OUTPATIENT)
Dept: PHYSICAL THERAPY | Age: 58
Discharge: HOME | End: 2018-09-06
Attending: INTERNAL MEDICINE
Payer: COMMERCIAL

## 2018-09-06 PROCEDURE — 97140 MANUAL THERAPY 1/> REGIONS: CPT

## 2018-09-06 NOTE — PROGRESS NOTES
Kathy Kan : 1960 Payor: Claudeen Lao / Plan: [facility] / Product Type: PPO /  [address] at [facility] [address] [facility], [place], [facility], [address], [facility], [address] [phone]   [phone] OUTPATIENT PHYSICAL THERAPY:Daily Note 2018   Visit # 16 ICD-10: Treatment Diagnosis: Low back pain (M54.5) Bilateral leg pain [M79.604, M79.605] Difficulty walking [R26.2] Precautions/Allergies:  
Lamisil [terbinafine] Fall Risk Score: 0 (? 5 = High Risk) MD Orders: Eval and Treat  MEDICAL/REFERRING DIAGNOSIS: 
Bilateral leg pain DATE OF ONSET: Chronic REFERRING PHYSICIAN: Mahnaz Lind MD 
RETURN PHYSICIAN APPOINTMENT: TBD by Kathy Kan 18 Kathy Kan has attended 15 sessions including initial evaluation. She was not seen for about a month as she and her family have been traveling. She has indicated that she feels better when she stretches and is honest about her poor compliance with exercises at home. We have been seeing her for quite some time and at this point I do feel as though she is reaching plateau with regards to flexibility. PT sessions have been able to \"loosen her up\" and improve pain with manual stretching; however, this is temporary--tightness returns as she is not stretching regularly at home. Consistent stretching and home strengthening can be performed at home--I have had her spouse come in to teach him proper manual techniques. .. Over the next visit I will review and then encourage her to continue these stretches at home. Plan of care ends 18. Kathy Kan will benefit from skilled PT (medically necessary) to address above deficits affecting participation in basic ADLs and overall functional tolerance.   Manual techniques (stretching, joint mobilizations, soft tissue mobilization/myofascial release), postural exercises/education, therapeutic techniques/activities, and HEP will be performed as appropriate addressing Toan Rea's current condition. PROBLEM LIST (Impacting functional limitations): 1. Decreased Strength 2. Decreased ADL/Functional Activities 3. Decreased Transfer Abilities 4. Decreased Ambulation Ability/Technique 5. Decreased Balance 6. Increased Pain 7. Decreased Activity Tolerance 8. Increased Fatigue 9. Increased Shortness of Breath 10. Decreased Flexibility/Joint Mobility 11. Decreased Bremer with Home Exercise Program INTERVENTIONS PLANNED: 
1. Balance Exercise 2. Bed Mobility 3. Cold 4. Cryotherapy 5. Electrical Stimulation 6. Family Education 7. Gait Training 8. Heat 9. Home Exercise Program (HEP) 10. Manual Therapy 11. Neuromuscular Re-education/Strengthening 12. Range of Motion (ROM) 13. Therapeutic Activites 14. Therapeutic Exercise/Strengthening 15. Transfer Training TREATMENT PLAN: 
Effective Dates: 6.19.18 TO 9/17/2018 (90 days). Frequency/Duration: 2-3 times a week for 90 Days GOALS: (Goals have been discussed and agreed upon with patient.) Short Term Goals 4 weeks Assessed 8.7.18; 9.4.18 
1. Richardson Closs will be independent with HEP   Goal Met 8.7.18; 9.4.18 
2. Richardson Closs will participate in LE stretching program to increase flexibility Goal Met 8.7.18; 9.4.18 3. Richardson Closs will participate in core stabilization exercises to help with stabilization during ADLs Goal Met 8.7.18; 9.4.18 4. Richardson Closs will participate in LE strengthening program with weights as appropriate to help with gait and elevations Goal Met 8.7.18; 9.4.18 5. Richardson Closs will participate in static and dynamic balance activities to decrease the risk for falls Goal Met 8.7.18; 9.4.18 6. Richardson Closs will tolerate manual therapy/joint mobilizations/soft tissue to increase ROM and decrease pain Goal Met 8.7.18; 9.4.18 [address] will be able to cross her legs with minimal difficulty and no pain. Continue--still limited 8. Ragini Perla will be able to get in and out of the car with minimal to no difficulty. Improved 9. Raigni Perla pelvis will stay level with regards to innominate position. Goal Met 8.7.18; 9.4.18 Long Term Goals 8 weeks Assessed 8.7.18; 9.4.18 
1. Ragini Perla will demonstrate a 8 point improvement on the Oswestry to show improvement in function Goal Met 8.7.18; 9.4.18 
2. Ragini Perla will report 0/10 pain at rest and during ADLs Goal Met 8.7.18; 9.4.18 3. Ragini Perla will demonstrate 5/5 LE strength on manual muscle testing Goal Met 8.7.18; 9.4.18 4. Ragini Perla will be able to perform SLS >5 seconds bilaterally to help with gait and improve balance  Goal Met 8.7.18; 9.4.18 HISTORY:  
History of Present Injury/Illness (Reason for Referral): Ragini Perla cannot cross her legs anymore and she has pain in her legs. She has increased stiffness. She talked with MD and explained she cannot cross her legs. She gets pain with crossing legs. She's been seeing Dawna Monsivais at [facility] to address soft tissue and flexibility. Ragini Perla is from [place] and traveled a couple weekends ago. She had a PT treatment before she left and it was not as hard for her to sit in car as usual.  The pain is not localized and dull. She has been sitting for many years with her occupation. Denies hx of knee pain or back pain. Pain is mainly to lateral hips. PMHx reveals: 
 
was trying to work out at [employer] but has been having more issues with walking Has been having issues of leg and hip pain bilateral for 2 yrs Her mobility has become restricted to the point she is unable to cross her legs and one leg is higher She has difficulty with squats and pain with prolonged sitting Immobility  In her glutes, psoas.  Adductors and hamstring and quads 
  
 Sent her for initial evaluation at [facility] and working on decrease in her pain and working on soft tissue  
  
Now would like to pursue more PT thru [facility] as needs strengthening and conditioning as well 
 
 
 
-Present symptoms/complaints (on day of evaluation) Pain Scale: · Current: 3/10 · Best: 3/10 · Worst:  7-8/10 Past Medical History/Comorbidities: Ms. Jennifer Mehta  has a past medical history of Abnormal finding on EKG; Chicken pox; Depression; Headache; Insomnia, transient (12/13/12); Measles; Mumps; Thyroid disease; and Uterine prolapse (12/13/12). Ms. Jennifer Mehta  has a past surgical history that includes hx hysteroscopy with endometrial ablation (2002); hx appendectomy (age 12); hx lap cholecystectomy (~2013); hx splenectomy; and hx heent. Social History/Living Environment:  
 
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  
 Number of children: N/A  
 Years of education: N/A Occupational History  Not on file. Social History Main Topics  Smoking status: Former Smoker Packs/day: 0.25 Years: 10.00 Quit date: 2001  Smokeless tobacco: Never Used  Alcohol use Yes Comment: 1-2 month  Drug use: No  
 Sexual activity: Yes  
  Partners: Male Birth control/ protection: Surgical  
   Comment: vasectomy Other Topics Concern  Not on file Social History Narrative Prior Level of Function/Work/Activity: 
Independent. Active Ambulatory Problems Diagnosis Date Noted  Insomnia 11/17/2015  Perimenopausal 11/17/2015  Goiter 11/17/2015  Rosacea 11/17/2015  Postoperative hypothyroidism 03/30/2016  Hypercholesterolemia 03/30/2016  Prediabetes 03/27/2017  Multinodular goiter 02/01/2018  Muscle ache of extremity 04/30/2018  Essential hypertension 06/06/2018 Resolved Ambulatory Problems Diagnosis Date Noted  No Resolved Ambulatory Problems Past Medical History:  
Diagnosis Date  Abnormal finding on EKG  Chicken pox  Depression  Headache  Insomnia, transient 12/13/12  Measles  Mumps  Thyroid disease  Uterine prolapse 12/13/12 Note: Patient denies any increase of symptoms with cough, sneeze or valsalva. Patient denies any saddle paresthesia or bowel/bladder deficits. Personal Factors:   
      Sex:  female Age:  63 y.o. Current Medications:   
Current Outpatient Prescriptions:  
  ibuprofen (MOTRIN) 800 mg tablet, TAKE 1 TABLET BY MOUTH EVERY 8 HOURS, Disp: 30 Tab, Rfl: 1   valACYclovir (VALTREX) 1 gram tablet, Take 1 Tab by mouth three (3) times daily. , Disp: 21 Tab, Rfl: 1   clonazePAM (KLONOPIN) 1 mg tablet, Take one tablet every evening., Disp: 30 Tab, Rfl: 5 
  losartan (COZAAR) 50 mg tablet, TAKE 1 TABLET DAILY FOR HYPERTENSION, Disp: 90 Tab, Rfl: 3 
  CINNAMON BARK-CHROMIUM PICOLIN PO, Take  by mouth., Disp: , Rfl:  
  LUTEIN PO, Take  by mouth., Disp: , Rfl:  
  MULTIVITAMIN WITH MINERALS (HAIR,SKIN AND NAILS PO), Take  by mouth., Disp: , Rfl:  
  L. ACID/L. CASEI/B.BIF/B.ARLYN/FOS (PROBIOTIC BLEND PO), Take  by mouth., Disp: , Rfl:   ASCORBATE CALCIUM (VITAMIN C PO), Take 1 Tab by mouth daily. Hold for surgery- last dose 2/6/17, Disp: , Rfl:  
  ZINC ACETATE PO, Take 1 Tab by mouth daily. Hold for surgery- last dose 2/6/17, Disp: , Rfl:  
  vitamin e 400 unit tab, Take 1 Tab by mouth daily. Hold for surgery- last dose 2/6/17, Disp: , Rfl:   Biotin 2,500 mcg cap, Take 1 Tab by mouth daily. Hold for surgery- last dose 2/6/17, Disp: , Rfl:  
  Calcium-Cholecalciferol, D3, (CALCIUM 600 WITH VITAMIN D3) 600 mg(1,500mg) -400 unit cap, Take 1 Tab by mouth daily. Hold for surgery- last dose 2/6/17, Disp: , Rfl:  
  omega-3 fatty acids-vitamin e (FISH OIL) 1,000 mg cap, Take 1 Cap by mouth daily.  Hold for surgery- last dose 2/6/17, Disp: , Rfl:   
 
 Date Last Reviewed:  9/6/2018 EXAMINATION:  
Observation/Orthostatic Postural Assessment:   
      Tightness B hip flexors. Palpation:   
       
ROM:   
       
AROM/PROM Joint: Eval Date: 6/19/18  Re-Assess Date: 8/7/18 and 9/4/18  Re-Assess Date: Active ROM RIGHT LEFT RIGHT LEFT RIGHT LEFT Knee Extension [facility] Knee Flexion 130   deg 130 deg Hip Flexion 40 deg  40 deg 50 deg 50 deg Hip Abduction 10 DEG 20 DEG 10 deg 20 deg Lumbar Flexion   ER limited --can only put L ankle to bottom of R patella. ER limited, but can get R foot to top of L knee. Lumbar Extension Lumbar Side-bending Lumbar Rotation IR 3O deg on Left, IR 36 deg on R 
ER 20 degrees B Strength:   
 Eval Date:  6/19/18  Re-Assess Date: 8/7/18 and 9/4/18  Re-Assess Date:   
  RIGHT LEFT RIGHT LEFT RIGHT LEFT Knee Flexion  5/5 5/5 5/5 5/5 Knee Extension (L3, L4) 4/5 4/5 4+/5 5/5 Hip Flexion (L1, L2) 5/5 5/5 5/5 5/5 Hip Abduction (L5, S1) 4-/5 4-/5 4/5 4/5 Ankle Dorsiflexion  5/5 5/5 5/5 5/5 Great Toe Extension (L5) 5/5 5/5 5/5 5/5 Single leg stance right/left: *Can perform, but fair ---Improved             Deep squat:  Very difficulty with poor flexibility Ham 90/90:  
 RIGHT LE: 40----50 deg LEFT LE: 40----50 deg Special Tests: JOSE=  + B 
            SLR (<60 degrees)= Negative SLUMP=  Negative SI Joint Tests: + [place], JOSE, Thigh Thrust, ASIS Distraction, Sacral Compression Neurological Screen: t  
 RADIATING SYMPTOMS?: YES/NO--NO--Generalized pain B Hips. Functional Mobility:  Affecting participation in basic ADLs and functional tasks. Balance:   
    Fair Outcome Measure: Tool Used: Lower Extremity Functional Scale (LEFS) Score:  Initial: 43/80 Most Recent: 57/80 (Date: 9/4.18 ) Interpretation of Score: 20 questions each scored on a 5 point scale with 0 representing \"extreme difficulty or unable to perform\" and 4 representing \"no difficulty\". The lower the score, the greater the functional disability. 80/80 represents no disability. Minimal detectable change is 9 points. Score 80 79-63 62-48 47-32 31-16 15-1 0 Modifier CH CI CJ CK CL CM CN  
 
 
? Mobility - Walking and Moving Around:  
  - CURRENT STATUS: CJ - 20%-39% impaired, limited or restricted  - GOAL STATUS: CJ - 20%-39% impaired, limited or restricted  - D/C STATUS:  ---------------To be determined--------------- Tool Used: Modified Oswestry Low Back Pain Questionnaire Score:  8.7.18 : 10/50  Most Recent: X/50 (Date: -- ) Interpretation of Score: Each section is scored on a 0-5 scale, 5 representing the greatest disability. The scores of each section are added together for a total score of 50. Score 0 1-10 11-20 21-30 31-40 41-49 50 Modifier CH CI CJ CK CL CM CN  
 
? Mobility - Walking and Moving Around:  
  - CURRENT STATUS: CI - 1%-19% impaired, limited or restricted  - GOAL STATUS: CI - 1%-19% impaired, limited or restricted  - D/C STATUS:  ---------------To be determined--------------- Medical Necessity:  
· Skilled intervention continues to be required due to above deficits affecting participation in basic ADLs and overall functional tolerance. Reason for Services/Other Comments: 
· Patient continues to require skilled intervention due to  above deficits affecting participation in basic ADLs and overall functional tolerance. TREATMENT:  
(In addition to Assessment/Re-Assessment sessions the following treatments were rendered) THERAPEUTIC EXERCISE: (- minutes):  Exercises per grid below to improve mobility, strength and balance. Required minimal visual and verbal cues to promote proper body alignment and promote proper body posture. Progressed resistance, range and complexity of movement as indicated. Date: 
*6/19/18 Date: 
6/20/18 Date: 6/29/18 Date: 
7/3/18 Date: 
7/9/18 Date: 
7/11/18 Date: 
7/18/18 Date: 
7/24/18 Date: 
8/2/18 Date: 
8/3/18 Date: 
8/7/18 Date: 
8/9/18 Date: 
9/4/18 Activity/Exercise Parameters Parameters Parameters LTR 10\"x10 IT Band 30\"x3 Hamstring Stretch 30\"x3  30\"x3 manual  30\"x3 manual  30\"x3 manual 30\"X3 manual 30\"X3 manual 30\"x3 manual  30\"x3 manual       
Prone hip extension   10x 10x 2x10 2x10 2x10 2x10 2X10 On BOSU  plank 20x Prone hip flexor stretch Prone HSC  10x 10x Hooklying hip abduction    Purple 2x10 Purple 2x10 Purple 2x10, adduction 20x Bridge on ball     2x10 on ball 2x20 on ball Standing hip extension and abduction     TB Blue 10x 4\" holds EACH TB Blue 10x 5\" Carly  NV  Fire hydrant 20x B        
RDL SL and Double leg        7# DL, 2# SL 10x e on 6\" box 10x at table height practicing technique Stool Scoot       4x walking 4x walking on carpet Shuttle       50# R LE, L LE 2X10 Bridge Rolling back          [employer] on BOSU 2x10 Plank          NV Doorway stretch         30\"x3 Row on BOSU           Blue x 20 Side to Side TB in hallway with TB Around arms also           X pattern Green HEP 2x SL Squat          NV     
1/2 Knee Core Press Out          Blue foam blue band 2x10 E     
1/2 Kneel Balance          NV Open Book          10\"x10 Treadmill          5' backwards incline 6 2 mph. Side Step TB Heel Raises Seated            25x Toe Raises Seated            25x Single knee to chest            30\"x3 Piriformis seated            30\"x3 Quad stretch standing            30\"x3 Education on Automatic Data onto glute            5' L/S Extensor Stretch seated with rotation and lateral to side            30\"X3 MedBridge Portal 
 
MANUAL THERAPY: (40 minutes): Joint mobilization, Soft tissue mobilization and Manipulation was utilized and necessary because of the patient's restricted joint motion, painful spasm, restricted motion of soft tissue. (Used abbreviations: MET - muscle energy technique; PNF - proprioceptive neuromuscular facilitation; NMR - neuromuscular re-education; AP - anterior to posterior; PA - posterior to anterior) MANUAL stretching B HS, piriformis, hip flexor. Lateral glide with belt on GT4 bouts at 100 deg hip flexion and through hip flexion range (NOT TODAY) Inferior glide with mobilization of femur into hip flexion;  (NOT TODAY) Grade IV mobs e 5 bouts L sacrum. MET anterior rotation R innominate (NOT TODAY). Harpreet Guerrero Manual traction supine LE long axis distraction B LE. Hip quadrant Grade III through range with ER oscillations 4 bouts. Treatment/Session Assessment:  Jordi Rodriguez is nearing the end of her plan of care. Continued manual technique as she was stiff from jet lag from [place]---we will review home exercises next session to make sure that she is performing appropriately. Encouraged her to have better compliance at home. · Pain/ Symptoms: Initial:   0-1/10   Post Session:  1/10 · Compliance with Program/Exercises: Will assess as treatment progresses. · Recommendations/Intent for next treatment session: \"Next visit will focus on advancements to more challenging activities\". Future Appointments Date Time Provider Fernanda Resendiz 9/6/2018 4:15 PM [facility]  
9/11/2018 4:15 PM JACOBO White  
9/13/2018 4:15 PM JACOBO White [facility] Total Treatment Duration: 36' PT Patient Time In/Time Out Time In: 1600 Time Out: 1233 Mayo Michaels DPT

## 2018-09-11 ENCOUNTER — HOSPITAL ENCOUNTER (OUTPATIENT)
Dept: PHYSICAL THERAPY | Age: 58
Discharge: HOME | End: 2018-09-11
Attending: INTERNAL MEDICINE
Payer: COMMERCIAL

## 2018-09-11 PROCEDURE — 97140 MANUAL THERAPY 1/> REGIONS: CPT

## 2018-09-11 NOTE — PROGRESS NOTES
Vina Cockayne : 1960 Payor: Diallo Guillermo / Plan: [place] [facility] / Product Type: PPO /  [address] at [facility] [address] [facility], [address] [place] [address], [facility], [address] [place] [address] [phone]   [phone] OUTPATIENT PHYSICAL THERAPY:Daily Note 2018   Visit # 17 ICD-10: Treatment Diagnosis: Low back pain (M54.5) Bilateral leg pain [M79.604, M79.605] Difficulty walking [R26.2] Precautions/Allergies:  
Lamisil [terbinafine] Fall Risk Score: 0 (? 5 = High Risk) MD Orders: Eval and Treat  MEDICAL/REFERRING DIAGNOSIS: 
Bilateral leg pain DATE OF ONSET: Chronic REFERRING PHYSICIAN: Larry Li MD 
RETURN PHYSICIAN APPOINTMENT: TBD by [place] Cockayne 18 Vina Cockayne has attended 15 sessions including initial evaluation. She was not seen for about a month as she and her family have been traveling. She has indicated that she feels better when she stretches and is honest about her poor compliance with exercises at home. We have been seeing her for quite some time and at this point I do feel as though she is reaching plateau with regards to flexibility. PT sessions have been able to \"loosen her up\" and improve pain with manual stretching; however, this is temporary--tightness returns as she is not stretching regularly at home. Consistent stretching and home strengthening can be performed at home--I have had her spouse come in to teach him proper manual techniques. .. Over the next visit I will review and then encourage her to continue these stretches at home. Plan of care ends 18. Vina Cockayne will benefit from skilled PT (medically necessary) to address above deficits affecting participation in basic ADLs and overall functional tolerance.   Manual techniques (stretching, joint mobilizations, soft tissue mobilization/myofascial release), postural exercises/education, therapeutic techniques/activities, and HEP will be performed as appropriate addressing David Rea's current condition. PROBLEM LIST (Impacting functional limitations): 1. Decreased Strength 2. Decreased ADL/Functional Activities 3. Decreased Transfer Abilities 4. Decreased Ambulation Ability/Technique 5. Decreased Balance 6. Increased Pain 7. Decreased Activity Tolerance 8. Increased Fatigue 9. Increased Shortness of Breath 10. Decreased Flexibility/Joint Mobility 11. Decreased [place] with Home Exercise Program INTERVENTIONS PLANNED: 
1. Balance Exercise 2. Bed Mobility 3. Cold 4. Cryotherapy 5. Electrical Stimulation 6. Family Education 7. Gait Training 8. Heat 9. Home Exercise Program (HEP) 10. Manual Therapy 11. Neuromuscular Re-education/Strengthening 12. Range of Motion (ROM) 13. Therapeutic Activites 14. Therapeutic Exercise/Strengthening 15. Transfer Training TREATMENT PLAN: 
Effective Dates: 6.19.18 TO 9/17/2018 (90 days). Frequency/Duration: 2-3 times a week for 90 Days GOALS: (Goals have been discussed and agreed upon with patient.) Short Term Goals 4 weeks Assessed 8.7.18; 9.4.18 
1. Ragini Perla will be independent with HEP   Goal Met 8.7.18; 9.4.18 
2. Ragini Perla will participate in LE stretching program to increase flexibility Goal Met 8.7.18; 9.4.18 3. Ragini Perla will participate in core stabilization exercises to help with stabilization during ADLs Goal Met 8.7.18; 9.4.18 4. Ragini Perla will participate in LE strengthening program with weights as appropriate to help with gait and elevations Goal Met 8.7.18; 9.4.18 5. Ragini Perla will participate in static and dynamic balance activities to decrease the risk for falls Goal Met 8.7.18; 9.4.18 6. Ragini Perla will tolerate manual therapy/joint mobilizations/soft tissue to increase ROM and decrease pain Goal Met 8.7.18; 9.4.18 [address] will be able to cross her legs with minimal difficulty and no pain. Continue--still limited 8. Lata Martinez will be able to get in and out of the car with minimal to no difficulty. Improved 9. Lata Martinez pelvis will stay level with regards to innominate position. Goal Met 8.7.18; 9.4.18 Long Term Goals 8 weeks Assessed 8.7.18; 9.4.18 
1. Lata Martinez will demonstrate a 8 point improvement on the Oswestry to show improvement in function Goal Met 8.7.18; 9.4.18 
2. Lata Martinez will report 0/10 pain at rest and during ADLs Goal Met 8.7.18; 9.4.18 3. Lata Martinez will demonstrate 5/5 LE strength on manual muscle testing Goal Met 8.7.18; 9.4.18 4. Lata Martinez will be able to perform SLS >5 seconds bilaterally to help with gait and improve balance  Goal Met 8.7.18; 9.4.18 HISTORY:  
History of Present Injury/Illness (Reason for Referral): Lata Martinez cannot cross her legs anymore and she has pain in her legs. She has increased stiffness. She talked with MD and explained she cannot cross her legs. She gets pain with crossing legs. She's been seeing An Zamora at [facility] to address soft tissue and flexibility. Lata Martinez is from [place] and traveled a couple weekends ago. She had a PT treatment before she left and it was not as hard for her to sit in car as usual.  The pain is not localized and dull. She has been sitting for many years with her occupation. Denies hx of knee pain or back pain. Pain is mainly to lateral hips. PMHx reveals: 
 
was trying to work out at [employer] but has been having more issues with walking Has been having issues of leg and hip pain bilateral for 2 yrs Her mobility has become restricted to the point she is unable to cross her legs and one leg is higher She has difficulty with squats and pain with prolonged sitting Immobility  In her glutes, psoas.  Adductors and hamstring and quads 
  
 Sent her for initial evaluation at [facility] and working on decrease in her pain and working on soft tissue  
  
Now would like to pursue more PT thru [facility] as needs strengthening and conditioning as well 
 
 
 
-Present symptoms/complaints (on day of evaluation) Pain Scale: · Current: 3/10 · Best: 3/10 · Worst:  7-8/10 Past Medical History/Comorbidities: Ms. Lakia Miller  has a past medical history of Abnormal finding on EKG; Chicken pox; Depression; Headache; Insomnia, transient (12/13/12); Measles; Mumps; Thyroid disease; and Uterine prolapse (12/13/12). Ms. Lakia Miller  has a past surgical history that includes hx hysteroscopy with endometrial ablation (2002); hx appendectomy (age 12); hx lap cholecystectomy (~2013); hx splenectomy; and hx heent. Social History/Living Environment:  
 
Richardson Closs works front office @ [employer] Social History Social History  Marital status:  Spouse name: N/A  
 Number of children: N/A  
 Years of education: N/A Occupational History  Not on file. Social History Main Topics  Smoking status: Former Smoker Packs/day: 0.25 Years: 10.00 Quit date: 2001  Smokeless tobacco: Never Used  Alcohol use Yes Comment: 1-2 month  Drug use: No  
 Sexual activity: Yes  
  Partners: Male Birth control/ protection: Surgical  
   Comment: vasectomy Other Topics Concern  Not on file Social History Narrative Prior Level of Function/Work/Activity: 
Independent. Active Ambulatory Problems Diagnosis Date Noted  Insomnia 11/17/2015  Perimenopausal 11/17/2015  Goiter 11/17/2015  Rosacea 11/17/2015  Postoperative hypothyroidism 03/30/2016  Hypercholesterolemia 03/30/2016  Prediabetes 03/27/2017  Multinodular goiter 02/01/2018  Muscle ache of extremity 04/30/2018  Essential hypertension 06/06/2018 Resolved Ambulatory Problems Diagnosis Date Noted  No Resolved Ambulatory Problems Past Medical History:  
Diagnosis Date  Abnormal finding on EKG  Chicken pox  Depression  Headache  Insomnia, transient 12/13/12  Measles  Mumps  Thyroid disease  Uterine prolapse 12/13/12 Note: Patient denies any increase of symptoms with cough, sneeze or valsalva. Patient denies any saddle paresthesia or bowel/bladder deficits. Personal Factors:   
      Sex:  female Age:  63 y.o. Current Medications:   
Current Outpatient Prescriptions:  
  ibuprofen (MOTRIN) 800 mg tablet, TAKE 1 TABLET BY MOUTH EVERY 8 HOURS, Disp: 30 Tab, Rfl: 1   valACYclovir (VALTREX) 1 gram tablet, Take 1 Tab by mouth three (3) times daily. , Disp: 21 Tab, Rfl: 1   clonazePAM (KLONOPIN) 1 mg tablet, Take one tablet every evening., Disp: 30 Tab, Rfl: 5 
  losartan (COZAAR) 50 mg tablet, TAKE 1 TABLET DAILY FOR HYPERTENSION, Disp: 90 Tab, Rfl: 3 
  CINNAMON BARK-CHROMIUM PICOLIN PO, Take  by mouth., Disp: , Rfl:  
  LUTEIN PO, Take  by mouth., Disp: , Rfl:  
  MULTIVITAMIN WITH MINERALS (HAIR,SKIN AND NAILS PO), Take  by mouth., Disp: , Rfl:  
  L. ACID/L. CASEI/B.BIF/B.ARLYN/FOS (PROBIOTIC BLEND PO), Take  by mouth., Disp: , Rfl:   ASCORBATE CALCIUM (VITAMIN C PO), Take 1 Tab by mouth daily. Hold for surgery- last dose 2/6/17, Disp: , Rfl:  
  ZINC ACETATE PO, Take 1 Tab by mouth daily. Hold for surgery- last dose 2/6/17, Disp: , Rfl:  
  vitamin e 400 unit tab, Take 1 Tab by mouth daily. Hold for surgery- last dose 2/6/17, Disp: , Rfl:   Biotin 2,500 mcg cap, Take 1 Tab by mouth daily. Hold for surgery- last dose 2/6/17, Disp: , Rfl:  
  Calcium-Cholecalciferol, D3, (CALCIUM 600 WITH VITAMIN D3) 600 mg(1,500mg) -400 unit cap, Take 1 Tab by mouth daily. Hold for surgery- last dose 2/6/17, Disp: , Rfl:  
  omega-3 fatty acids-vitamin e (FISH OIL) 1,000 mg cap, Take 1 Cap by mouth daily.  Hold for surgery- last dose 2/6/17, Disp: , Rfl:   
 
 Date Last Reviewed:  9/11/2018 EXAMINATION:  
Observation/Orthostatic Postural Assessment:   
      Tightness B hip flexors. Palpation:   
       
ROM:   
       
AROM/PROM Joint: Eval Date: 6/19/18  Re-Assess Date: 8/7/18 and 9/4/18  Re-Assess Date: Active ROM RIGHT LEFT RIGHT LEFT RIGHT LEFT Knee Extension [facility] Knee Flexion 130   deg 130 deg Hip Flexion 40 deg  40 deg 50 deg 50 deg Hip Abduction 10 DEG 20 DEG 10 deg 20 deg Lumbar Flexion   ER limited --can only put L ankle to bottom of R patella. ER limited, but can get R foot to top of L knee. Lumbar Extension Lumbar Side-bending Lumbar Rotation IR 3O deg on Left, IR 36 deg on R 
ER 20 degrees B Strength:   
 Eval Date:  6/19/18  Re-Assess Date: 8/7/18 and 9/4/18  Re-Assess Date:   
  RIGHT LEFT RIGHT LEFT RIGHT LEFT Knee Flexion  5/5 5/5 5/5 5/5 Knee Extension (L3, L4) 4/5 4/5 4+/5 5/5 Hip Flexion (L1, L2) 5/5 5/5 5/5 5/5 Hip Abduction (L5, S1) 4-/5 4-/5 4/5 4/5 Ankle Dorsiflexion  5/5 5/5 5/5 5/5 Great Toe Extension (L5) 5/5 5/5 5/5 5/5 Single leg stance right/left: *Can perform, but fair ---Improved             Deep squat:  Very difficulty with poor flexibility Ham 90/90:  
 RIGHT LE: 40----50 deg LEFT LE: 40----50 deg Special Tests: JOSE=  + B 
            SLR (<60 degrees)= Negative SLUMP=  Negative SI Joint Tests: + [place], JOSE, Thigh Thrust, ASIS Distraction, Sacral Compression Neurological Screen: t  
 RADIATING SYMPTOMS?: YES/NO--NO--Generalized pain B Hips. Functional Mobility:  Affecting participation in basic ADLs and functional tasks. Balance:   
    Fair Outcome Measure: Tool Used: Lower Extremity Functional Scale (LEFS) Score:  Initial: 43/80 Most Recent: 57/80 (Date: 9/4.18 ) Interpretation of Score: 20 questions each scored on a 5 point scale with 0 representing \"extreme difficulty or unable to perform\" and 4 representing \"no difficulty\". The lower the score, the greater the functional disability. 80/80 represents no disability. Minimal detectable change is 9 points. Score 80 79-63 62-48 47-32 31-16 15-1 0 Modifier CH CI CJ CK CL CM CN  
 
 
? Mobility - Walking and Moving Around:  
  - CURRENT STATUS: CJ - 20%-39% impaired, limited or restricted  - GOAL STATUS: CJ - 20%-39% impaired, limited or restricted  - D/C STATUS:  ---------------To be determined--------------- Tool Used: Modified Oswestry Low Back Pain Questionnaire Score:  8.7.18 : 10/50  Most Recent: X/50 (Date: -- ) Interpretation of Score: Each section is scored on a 0-5 scale, 5 representing the greatest disability. The scores of each section are added together for a total score of 50. Score 0 1-10 11-20 21-30 31-40 41-49 50 Modifier CH CI CJ CK CL CM CN  
 
? Mobility - Walking and Moving Around:  
  - CURRENT STATUS: CI - 1%-19% impaired, limited or restricted  - GOAL STATUS: CI - 1%-19% impaired, limited or restricted  - D/C STATUS:  ---------------To be determined--------------- Medical Necessity:  
· Skilled intervention continues to be required due to above deficits affecting participation in basic ADLs and overall functional tolerance. Reason for Services/Other Comments: 
· Patient continues to require skilled intervention due to  above deficits affecting participation in basic ADLs and overall functional tolerance. TREATMENT:  
(In addition to Assessment/Re-Assessment sessions the following treatments were rendered) THERAPEUTIC EXERCISE: (- minutes):  Exercises per grid below to improve mobility, strength and balance. Required minimal visual and verbal cues to promote proper body alignment and promote proper body posture. Progressed resistance, range and complexity of movement as indicated. Date: 
*6/19/18 Date: 
6/20/18 Date: 6/29/18 Date: 
7/3/18 Date: 
7/9/18 Date: 
7/11/18 Date: 
7/18/18 Date: 
7/24/18 Date: 
8/2/18 Date: 
8/3/18 Date: 
8/7/18 Date: 
8/9/18 Date: 
9/4/18 Activity/Exercise Parameters Parameters Parameters LTR 10\"x10 IT Band 30\"x3 Hamstring Stretch 30\"x3  30\"x3 manual  30\"x3 manual  30\"x3 manual 30\"X3 manual 30\"X3 manual 30\"x3 manual  30\"x3 manual       
Prone hip extension   10x 10x 2x10 2x10 2x10 2x10 2X10 On BOSU  plank 20x Prone hip flexor stretch Prone HSC  10x 10x Hooklying hip abduction    Purple 2x10 Purple 2x10 Purple 2x10, adduction 20x Bridge on ball     2x10 on ball 2x20 on ball Standing hip extension and abduction     TB Blue 10x 4\" holds EACH TB Blue 10x 5\" Sylvester Em NV  Fire hydrant 20x B        
RDL SL and Double leg        7# DL, 2# SL 10x e on 6\" box 10x at table height practicing technique Stool Scoot       4x walking 4x walking on carpet Shuttle       50# R LE, L LE 2X10 Bridge Rolling back          [employer] on BOSU 2x10 Plank          NV Doorway stretch         30\"x3 Row on BOSU           Blue x 20 Side to Side TB in hallway with TB Around arms also           X pattern Green HEP 2x SL Squat          NV     
1/2 Knee Core Press Out          Blue foam blue band 2x10 E     
1/2 Kneel Balance          NV Open Book          10\"x10 Treadmill          5' backwards incline 6 2 mph. Side Step TB Heel Raises Seated            25x Toe Raises Seated            25x Single knee to chest            30\"x3 Piriformis seated            30\"x3 Quad stretch standing            30\"x3 Education on Automatic Data onto glute            5' L/S Extensor Stretch seated with rotation and lateral to side            30\"X3 MedBridge Portal 
 
MANUAL THERAPY: (40 minutes): Joint mobilization, Soft tissue mobilization and Manipulation was utilized and necessary because of the patient's restricted joint motion, painful spasm, restricted motion of soft tissue. (Used abbreviations: MET - muscle energy technique; PNF - proprioceptive neuromuscular facilitation; NMR - neuromuscular re-education; AP - anterior to posterior; PA - posterior to anterior) MANUAL stretching B HS, piriformis, hip flexor. Lateral glide with belt on GT4 bouts at 100 deg hip flexion and through hip flexion range (NOT TODAY) Inferior glide with mobilization of femur into hip flexion;  (NOT TODAY) Grade IV mobs e 5 bouts L sacrum. MET anterior rotation R innominate (NOT TODAY). Oris Wilkes Manual traction supine LE long axis distraction B LE. Hip quadrant Grade III through range with ER oscillations 4 bouts. Treatment/Session Assessment:  Rudy Hence is nearing the end of her plan of care. Rudy Hence forgot exercises to bring for us to review, so reverted back to stretching. I strongly encouraged her to bring these exercises on Thursday. One more session remaining. · Pain/ Symptoms: Initial:   0-1/10   Post Session:  1/10 · Compliance with Program/Exercises: Will assess as treatment progresses. · Recommendations/Intent for next treatment session: \"Next visit will focus on advancements to more challenging activities\". Future Appointments Date Time Provider Fernanda Resendiz 9/13/2018 4:15 PM Jing Hameed DPT [facility] Total Treatment Duration: 36' PT Patient Time In/Time Out Time In: 6300 Time Out: 1700 Jing Hameed DPT

## 2018-09-13 ENCOUNTER — HOSPITAL ENCOUNTER (OUTPATIENT)
Dept: PHYSICAL THERAPY | Age: 58
Discharge: HOME | End: 2018-09-13
Attending: INTERNAL MEDICINE
Payer: COMMERCIAL

## 2018-09-13 PROCEDURE — 97140 MANUAL THERAPY 1/> REGIONS: CPT

## 2018-09-13 NOTE — PROGRESS NOTES
Juana Brittle : 1960 Payor: Rashmi Carlos / Plan: [place] [employer] [facility] / Product Type: PPO /  Silas Cast at [facility] [address] [place] [address], [address] [place] [address], [facility], [address] [place] [address] [phone]   [phone] OUTPATIENT PHYSICAL THERAPY:Daily Note and Discharge 2018   Visit # 18 ICD-10: Treatment Diagnosis: Low back pain (M54.5) Bilateral leg pain [M79.604, M79.605] Difficulty walking [R26.2] Precautions/Allergies:  
Lamisil [terbinafine] Fall Risk Score: 0 (? 5 = High Risk) MD Orders: Eval and Treat  MEDICAL/REFERRING DIAGNOSIS: 
Bilateral leg pain DATE OF ONSET: Chronic REFERRING PHYSICIAN: Aleida Martin MD 
RETURN PHYSICIAN APPOINTMENT: TBD by Raziaana Brittle 18 Juana Brittle has attended 18 sessions including intiial evaluation. She has reached end of POC and has plateau'd with regards to progress at this time. We reviewed HEP and Juana Brittle verbalizes understanding of home exercise program - I have encouraged her to contact PT at any time if I can be of future assistance. See below for last progress report---> 
 
18 Juana Brittle has attended 15 sessions including initial evaluation. She was not seen for about a month as she and her family have been traveling. She has indicated that she feels better when she stretches and is honest about her poor compliance with exercises at home. We have been seeing her for quite some time and at this point I do feel as though she is reaching plateau with regards to flexibility. PT sessions have been able to \"loosen her up\" and improve pain with manual stretching; however, this is temporary--tightness returns as she is not stretching regularly at home.   Consistent stretching and home strengthening can be performed at home--I have had her spouse come in to teach him proper manual techniques. .. Over the next visit I will review and then encourage her to continue these stretches at home. Plan of care ends 9.17.18. PROBLEM LIST (Impacting functional limitations): 1. Decreased Strength 2. Decreased ADL/Functional Activities 3. Decreased Transfer Abilities 4. Decreased Ambulation Ability/Technique 5. Decreased Balance 6. Increased Pain 7. Decreased Activity Tolerance 8. Increased Fatigue 9. Increased Shortness of Breath 10. Decreased Flexibility/Joint Mobility 11. Decreased [place] with Home Exercise Program INTERVENTIONS PLANNED: 
1. Balance Exercise 2. Bed Mobility 3. Cold 4. Cryotherapy 5. Electrical Stimulation 6. Family Education 7. Gait Training 8. Heat 9. Home Exercise Program (HEP) 10. Manual Therapy 11. Neuromuscular Re-education/Strengthening 12. Range of Motion (ROM) 13. Therapeutic Activites 14. Therapeutic Exercise/Strengthening 15. Transfer Training TREATMENT PLAN: 
Effective Dates: 6.19.18 TO 9/17/2018 (90 days). Frequency/Duration: 2-3 times a week for 90 Days GOALS: (Goals have been discussed and agreed upon with patient.) Short Term Goals 4 weeks Assessed 8.7.18; 9.4.18; 9.13.18 
1Kumar Gamez will be independent with HEP   Goal Met 8.7.18; 9.4.18; 9.13.18 
2. Boubacar Gamez will participate in LE stretching program to increase flexibility Goal Met 8.7.18; 9.4.18; 9.13.18 
3. Boubacar Gamez will participate in core stabilization exercises to help with stabilization during ADLs Goal Met 8.7.18; 9.4.18; 9.13.18 
4. Boubacar Gamez will participate in LE strengthening program with weights as appropriate to help with gait and elevations Goal Met 8.7.18; 9.4.18; 9.13.18 
5. Boubacar Gamez will participate in static and dynamic balance activities to decrease the risk for falls Goal Met 8.7.18; 9.4.18; 9.13.18 
6Kumar Gamez will tolerate manual therapy/joint mobilizations/soft tissue to increase ROM and decrease pain Goal Met 8.7.18; 9.4.18; 9.13.18 
7. Ragini Perla will be able to cross her legs with minimal difficulty and no pain. Continue--still limited 8. Ragini Perla will be able to get in and out of the car with minimal to no difficulty. Improved 9. Ragini Perla pelvis will stay level with regards to innominate position. Goal Met 8.7.18; 9.4.18; 9.13.18 Long Term Goals 8 weeks Assessed 8.7.18; 9.4.18; 9.13.18 
1. Ragini Perla will demonstrate a 8 point improvement on the Oswestry to show improvement in function Goal Met 8.7.18; 9.4.18; 9.13.18 
2. Ragini Perla will report 0/10 pain at rest and during ADLs Goal Met 8.7.18; 9.4.18; 9.13.18 
3. Ragini Perla will demonstrate 5/5 LE strength on manual muscle testing Goal Met 8.7.18; 9.4.18; 9.13.18 
4. Ragini Perla will be able to perform SLS >5 seconds bilaterally to help with gait and improve balance  Goal Met 8.7.18; 9.4.18; 9.13.18 HISTORY:  
History of Present Injury/Illness (Reason for Referral): Ragini Perla cannot cross her legs anymore and she has pain in her legs. She has increased stiffness. She talked with MD and explained she cannot cross her legs. She gets pain with crossing legs. She's been seeing Dawna Monsivais at [facility] to address soft tissue and flexibility. Ragini Perla is from [place] and traveled a couple weekends ago. She had a PT treatment before she left and it was not as hard for her to sit in car as usual.  The pain is not localized and dull. She has been sitting for many years with her occupation. Denies hx of knee pain or back pain. Pain is mainly to lateral hips. PMHx reveals: 
 
was trying to work out at [employer] but has been having more issues with walking Has been having issues of leg and hip pain bilateral for 2 yrs Her mobility has become restricted to the point she is unable to cross her legs and one leg is higher She has difficulty with squats and pain with prolonged sitting Immobility  In her glutes, psoas. Adductors and hamstring and quads 
  
Sent her for initial evaluation at [facility] and working on decrease in her pain and working on soft tissue  
  
Now would like to pursue more PT thru [facility] as needs strengthening and conditioning as well 
 
 
 
-Present symptoms/complaints (on day of evaluation) Pain Scale: · Current: 3/10 · Best: 3/10 · Worst:  7-8/10 Past Medical History/Comorbidities: Ms. Liz Yu  has a past medical history of Abnormal finding on EKG; Chicken pox; Depression; Headache; Insomnia, transient (12/13/12); Measles; Mumps; Thyroid disease; and Uterine prolapse (12/13/12). Ms. Liz Yu  has a past surgical history that includes hx hysteroscopy with endometrial ablation (2002); hx appendectomy (age 12); hx lap cholecystectomy (~2013); hx splenectomy; and hx heent. Social History/Living Environment:  
 
Osman Moses works front office @ [employer] Social History Social History  Marital status:  Spouse name: N/A  
 Number of children: N/A  
 Years of education: N/A Occupational History  Not on file. Social History Main Topics  Smoking status: Former Smoker Packs/day: 0.25 Years: 10.00 Quit date: 2001  Smokeless tobacco: Never Used  Alcohol use Yes Comment: 1-2 month  Drug use: No  
 Sexual activity: Yes  
  Partners: Male Birth control/ protection: Surgical  
   Comment: vasectomy Other Topics Concern  Not on file Social History Narrative Prior Level of Function/Work/Activity: 
Independent. Active Ambulatory Problems Diagnosis Date Noted  Insomnia 11/17/2015  Perimenopausal 11/17/2015  Goiter 11/17/2015  Rosacea 11/17/2015  Postoperative hypothyroidism 03/30/2016  Hypercholesterolemia 03/30/2016  Prediabetes 03/27/2017  Multinodular goiter 02/01/2018  Muscle ache of extremity 04/30/2018  Essential hypertension 06/06/2018 Resolved Ambulatory Problems Diagnosis Date Noted  No Resolved Ambulatory Problems Past Medical History:  
Diagnosis Date  Abnormal finding on EKG  Chicken pox  Depression  Headache  Insomnia, transient 12/13/12  Measles  Mumps  Thyroid disease  Uterine prolapse 12/13/12 Note: Patient denies any increase of symptoms with cough, sneeze or valsalva. Patient denies any saddle paresthesia or bowel/bladder deficits. Personal Factors:   
      Sex:  female Age:  63 y.o. Current Medications:   
Current Outpatient Prescriptions:  
  ibuprofen (MOTRIN) 800 mg tablet, TAKE 1 TABLET BY MOUTH EVERY 8 HOURS, Disp: 30 Tab, Rfl: 1   valACYclovir (VALTREX) 1 gram tablet, Take 1 Tab by mouth three (3) times daily. , Disp: 21 Tab, Rfl: 1   clonazePAM (KLONOPIN) 1 mg tablet, Take one tablet every evening., Disp: 30 Tab, Rfl: 5 
  losartan (COZAAR) 50 mg tablet, TAKE 1 TABLET DAILY FOR HYPERTENSION, Disp: 90 Tab, Rfl: 3 
  CINNAMON BARK-CHROMIUM PICOLIN PO, Take  by mouth., Disp: , Rfl:  
  LUTEIN PO, Take  by mouth., Disp: , Rfl:  
  MULTIVITAMIN WITH MINERALS (HAIR,SKIN AND NAILS PO), Take  by mouth., Disp: , Rfl:  
  L. ACID/L. CASEI/B.BIF/B.ARLYN/FOS (PROBIOTIC BLEND PO), Take  by mouth., Disp: , Rfl:   ASCORBATE CALCIUM (VITAMIN C PO), Take 1 Tab by mouth daily. Hold for surgery- last dose 2/6/17, Disp: , Rfl:  
  ZINC ACETATE PO, Take 1 Tab by mouth daily. Hold for surgery- last dose 2/6/17, Disp: , Rfl:  
  vitamin e 400 unit tab, Take 1 Tab by mouth daily. Hold for surgery- last dose 2/6/17, Disp: , Rfl:   Biotin 2,500 mcg cap, Take 1 Tab by mouth daily. Hold for surgery- last dose 2/6/17, Disp: , Rfl:  
  Calcium-Cholecalciferol, D3, (CALCIUM 600 WITH VITAMIN D3) 600 mg(1,500mg) -400 unit cap, Take 1 Tab by mouth daily.  Hold for surgery- last dose 2/6/17, Disp: , Rfl:  
   omega-3 fatty acids-vitamin e (FISH OIL) 1,000 mg cap, Take 1 Cap by mouth daily. Hold for surgery- last dose 2/6/17, Disp: , Rfl:   
 
Date Last Reviewed:  9/13/2018 EXAMINATION:  
Observation/Orthostatic Postural Assessment:   
      Tightness B hip flexors. Palpation:   
       
ROM:   
       
AROM/PROM Joint: Eval Date: 6/19/18  Re-Assess Date: 8/7/18 and 9/4/18  Re-Assess Date:  9/13/18 Active ROM RIGHT LEFT RIGHT LEFT RIGHT LEFT Knee Extension [facility] Knee Flexion 130   deg 130 deg 130 130 Hip Flexion 40 deg  40 deg 50 deg 50 deg Hip Abduction 10 DEG 20 DEG 10 deg 20 deg Lumbar Flexion   ER limited --can only put L ankle to bottom of R patella. ER limited, but can get R foot to top of L knee. Lumbar Extension Lumbar Side-bending Lumbar Rotation IR 3O deg on Left, IR 36 deg on R 
ER 20 degrees B Strength:   
 Eval Date:  6/19/18  Re-Assess Date: 8/7/18 and 9/4/18  Re-Assess Date 9/13/18:   
  RIGHT LEFT RIGHT LEFT RIGHT LEFT Knee Flexion  5/5 5/5 5/5 5/5 5/5 5/5 Knee Extension (L3, L4) 4/5 4/5 4+/5 5/5 5/5 5/5 Hip Flexion (L1, L2) 5/5 5/5 5/5 5/5 5/5 5/5 Hip Abduction (L5, S1) 4-/5 4-/5 4/5 4/5 4/5 4/5 Ankle Dorsiflexion  5/5 5/5 5/5 5/5 5/5 5/5 Great Toe Extension (L5) 5/5 5/5 5/5 5/5  5/5 Single leg stance right/left: *Can perform, but fair ---Improved             Deep squat:  Very difficulty with poor flexibility Ham 90/90:  
 RIGHT LE: 40----50 deg LEFT LE: 40----50 deg Special Tests: JOSE=  + B 
            SLR (<60 degrees)= Negative SLUMP=  Negative SI Joint Tests: + JOSE Vidal, Thigh Thrust, ASIS Distraction, Sacral Compression Neurological Screen: t  
 RADIATING SYMPTOMS?: YES/NO--NO--Generalized pain B Hips. Functional Mobility:  Affecting participation in basic ADLs and functional tasks. Balance:   
    Fair Outcome Measure: Tool Used: Lower Extremity Functional Scale (LEFS) Score:  Initial: 43/80 Most Recent: 57/80 (Date: 9/13.18 ) Interpretation of Score: 20 questions each scored on a 5 point scale with 0 representing \"extreme difficulty or unable to perform\" and 4 representing \"no difficulty\". The lower the score, the greater the functional disability. 80/80 represents no disability. Minimal detectable change is 9 points. Score 80 79-63 62-48 47-32 31-16 15-1 0 Modifier CH CI CJ CK CL CM CN  
 
 
? Mobility - Walking and Moving Around:  
  - CURRENT STATUS: CJ - 20%-39% impaired, limited or restricted  - GOAL STATUS: CJ - 20%-39% impaired, limited or restricted  - D/C STATUS:  ---------------To be determined--------------- 
 
  
TREATMENT:  
(In addition to Assessment/Re-Assessment sessions the following treatments were rendered) THERAPEUTIC EXERCISE: (- minutes):  Exercises per grid below to improve mobility, strength and balance. Required minimal visual and verbal cues to promote proper body alignment and promote proper body posture. Progressed resistance, range and complexity of movement as indicated. Date: 
*6/19/18 Date: 
6/20/18 Date: 6/29/18 Date: 
7/3/18 Date: 
7/9/18 Date: 
7/11/18 Date: 
7/18/18 Date: 
7/24/18 Date: 
8/2/18 Date: 
8/3/18 Date: 
8/7/18 Date: 
8/9/18 Date: 
9/4/18 Date: 
9/13/18 Activity/Exercise Parameters Parameters Parameters LTR 10\"x10 IT Band 30\"x3 Hamstring Stretch 30\"x3  30\"x3 manual  30\"x3 manual  30\"x3 manual 30\"X3 manual 30\"X3 manual 30\"x3 manual  30\"x3 manual        
Prone hip extension   10x 10x 2x10 2x10 2x10 2x10 2X10 On BOSU  plank 20x Prone hip flexor stretch Prone HSC  10x 10x Hooklying hip abduction    Purple 2x10 Purple 2x10 Purple 2x10, adduction 20x Bridge on ball     2x10 on ball 2x20 on ball Standing hip extension and abduction     TB Blue 10x 4\" holds EACH TB Blue 10x 5\" Cloteal Alejandro [place]  Fire hydrant 20x B         
RDL SL and Double leg        7# DL, 2# SL 10x e on 6\" box 10x at table height practicing technique Stool Scoot       4x walking 4x walking on carpet Shuttle       50# R LE, L LE 2X10 Bridge Rolling back          [employer] on BOSU 2x10 Plank          NV Doorway stretch         30\"x3 Row on BOSU           Blue x 20 Side to Side TB in hallway with TB Around arms also           X pattern Green HEP 2x SL Squat          NV      
1/2 Knee Core Press Out          Blue foam blue band 2x10 E      
1/2 Kneel Balance          NV Open Book          10\"x10 Treadmill          5' backwards incline 6 2 mph. Side Step TB Heel Raises Seated            25x Toe Raises Seated            25x Single knee to chest            30\"x3 Piriformis seated            30\"x3 Quad stretch standing            30\"x3 Education on [employer] onto glute            5'    
L/S Extensor Stretch seated with rotation and lateral to side            30\"X3 MedBridge Portal 
 
MANUAL THERAPY: (38  minutes): Joint mobilization, Soft tissue mobilization and Manipulation was utilized and necessary bbecause of the patient's restricted joint motion, painful spasm, restricted motion of soft tissue. (Used abbreviations: MET - muscle energy technique; PNF - proprioceptive neuromuscular facilitation; NMR - neuromuscular re-education; AP - anterior to posterior; PA - posterior to anterior) MANUAL stretching B HS, piriformis, hip flexor. Lateral glide with belt on GT4 bouts at 100 deg hip flexion and through hip flexion range (NOT TODAY) Inferior glide with mobilization of femur into hip flexion;  (NOT TODAY) Grade IV mobs e 5 bouts L sacrum. MET anterior rotation R innominate (NOT TODAY). Giovanny Rizzo Manual traction supine LE long axis distraction B LE. Hip quadrant Grade III through range with ER oscillations 4 bouts. Treatment/Session Assessment:  Emil Aver this session. Reviewed HEP. Ready for DC. · Pain/ Symptoms: Initial:   0-1/10   Post Session:  1/10 · Future Appointments Date Time Provider Fernanda Astrid 9/13/2018 4:15 PM Aleksander Selby DPT [facility] Total Treatment Duration: 36' PT Patient Time In/Time Out Time In: 0234 Time Out: 6161 Aleksander Selby DPT

## 2018-09-20 ENCOUNTER — APPOINTMENT (OUTPATIENT)
Dept: PHYSICAL THERAPY | Age: 58
End: 2018-09-20
Attending: INTERNAL MEDICINE
Payer: COMMERCIAL

## 2018-09-21 ENCOUNTER — APPOINTMENT (OUTPATIENT)
Dept: PHYSICAL THERAPY | Age: 58
End: 2018-09-21
Attending: INTERNAL MEDICINE
Payer: COMMERCIAL

## 2018-09-27 ENCOUNTER — APPOINTMENT (OUTPATIENT)
Dept: PHYSICAL THERAPY | Age: 58
End: 2018-09-27
Attending: INTERNAL MEDICINE
Payer: COMMERCIAL

## 2018-09-28 PROBLEM — Z80.3 FAMILY HISTORY OF BREAST CANCER IN FIRST DEGREE RELATIVE: Status: ACTIVE | Noted: 2018-09-28

## 2018-09-28 PROBLEM — R92.2 DENSE BREASTS: Status: ACTIVE | Noted: 2018-09-28

## 2018-10-01 ENCOUNTER — APPOINTMENT (OUTPATIENT)
Dept: PHYSICAL THERAPY | Age: 58
End: 2018-10-01
Attending: INTERNAL MEDICINE

## 2018-10-03 ENCOUNTER — APPOINTMENT (OUTPATIENT)
Dept: PHYSICAL THERAPY | Age: 58
End: 2018-10-03
Attending: INTERNAL MEDICINE

## 2018-10-09 ENCOUNTER — APPOINTMENT (OUTPATIENT)
Dept: PHYSICAL THERAPY | Age: 58
End: 2018-10-09
Attending: INTERNAL MEDICINE

## 2018-10-11 ENCOUNTER — APPOINTMENT (OUTPATIENT)
Dept: PHYSICAL THERAPY | Age: 58
End: 2018-10-11
Attending: INTERNAL MEDICINE

## 2018-10-16 ENCOUNTER — APPOINTMENT (OUTPATIENT)
Dept: PHYSICAL THERAPY | Age: 58
End: 2018-10-16
Attending: INTERNAL MEDICINE

## 2018-10-18 ENCOUNTER — APPOINTMENT (OUTPATIENT)
Dept: PHYSICAL THERAPY | Age: 58
End: 2018-10-18
Attending: INTERNAL MEDICINE

## 2018-10-23 ENCOUNTER — APPOINTMENT (OUTPATIENT)
Dept: PHYSICAL THERAPY | Age: 58
End: 2018-10-23
Attending: INTERNAL MEDICINE

## 2018-10-25 ENCOUNTER — APPOINTMENT (OUTPATIENT)
Dept: PHYSICAL THERAPY | Age: 58
End: 2018-10-25
Attending: INTERNAL MEDICINE

## 2018-10-25 ENCOUNTER — HOSPITAL ENCOUNTER (OUTPATIENT)
Dept: MRI IMAGING | Age: 58
Discharge: HOME | End: 2018-10-25
Attending: INTERNAL MEDICINE
Payer: COMMERCIAL

## 2018-10-25 DIAGNOSIS — R92.2 DENSE BREASTS: ICD-10-CM

## 2018-10-25 DIAGNOSIS — Z80.3 FAMILY HISTORY OF BREAST CANCER IN FIRST DEGREE RELATIVE: ICD-10-CM

## 2018-10-25 PROCEDURE — 74011000258 HC RX REV CODE- 258

## 2018-10-25 PROCEDURE — 74011250636 HC RX REV CODE- 250/636

## 2018-10-25 PROCEDURE — 77059 MRI BREAST BI W WO CONT: CPT

## 2018-10-25 PROCEDURE — A9575 INJ GADOTERATE MEGLUMI 0.1ML: HCPCS

## 2018-10-25 RX ORDER — SODIUM CHLORIDE 0.9 % (FLUSH) 0.9 %
10 SYRINGE (ML) INJECTION
Status: COMPLETED | OUTPATIENT
Start: 2018-10-25 | End: 2018-10-25

## 2018-10-25 RX ORDER — GADOTERATE MEGLUMINE 376.9 MG/ML
15 INJECTION INTRAVENOUS
Status: COMPLETED | OUTPATIENT
Start: 2018-10-25 | End: 2018-10-25

## 2018-10-25 RX ADMIN — GADOTERATE MEGLUMINE 15 ML: 376.9 INJECTION INTRAVENOUS at 08:38

## 2018-10-25 RX ADMIN — SODIUM CHLORIDE 100 ML: 900 INJECTION, SOLUTION INTRAVENOUS at 08:38

## 2018-10-25 RX ADMIN — Medication 10 ML: at 08:38

## 2018-10-25 NOTE — LETTER
10/26/2018 9:51 AM 
 
Ms. Kathryn Pool [address] Dear Kathryn Pool: 
 
Please find your most recent results below. Resulted Orders MRI BREAST BI W WO CONT Narrative MRI of the Breasts INDICATION:  Family history of mother with breast cancer at age 48, 
heterogeneously dense breast tissue. Standard MRI sequences were obtained through the breasts in multiple planes. Images were obtained before and after intravenous infusion of 15 mL of Dotarem. FINDINGS: 
There is a 1 cm well-defined mildly enhancing nodule at 12:00, mid level of the 
left breast.  The nodule is high signal on T2 sequences and is stable 
mammographically compared to the study from 05/06/2017. This is likely a benign 
fibroadenoma. Several other scattered small foci of enhancement are noted in both breasts, all 
4 mm or less in size. There is no significant adenopathy. There are no chest 
wall lesions. Impression IMPRESSION: Small fibroadenoma in the central left breast 12:00. No evidence of 
malignancy. BI-RADS Assessment Category 2: Benign finding. AM EDT Small fibroadenoma in left breast- appears benign Copy to pt Please call me if you have any questions: [phone] Sincerely, [facility]

## 2018-10-29 ENCOUNTER — APPOINTMENT (OUTPATIENT)
Dept: PHYSICAL THERAPY | Age: 58
End: 2018-10-29
Attending: INTERNAL MEDICINE

## 2018-10-31 ENCOUNTER — APPOINTMENT (OUTPATIENT)
Dept: PHYSICAL THERAPY | Age: 58
End: 2018-10-31
Attending: INTERNAL MEDICINE

## 2019-08-10 ENCOUNTER — HOSPITAL ENCOUNTER (OUTPATIENT)
Dept: MAMMOGRAPHY | Age: 59
Discharge: HOME | End: 2019-08-10
Attending: INTERNAL MEDICINE
Payer: COMMERCIAL

## 2019-08-10 DIAGNOSIS — Z12.31 VISIT FOR SCREENING MAMMOGRAM: ICD-10-CM

## 2019-08-10 PROCEDURE — 77063 BREAST TOMOSYNTHESIS BI: CPT

## 2019-09-30 ENCOUNTER — HOSPITAL ENCOUNTER (OUTPATIENT)
Dept: GENERAL RADIOLOGY | Age: 59
Discharge: HOME | End: 2019-09-30

## 2019-09-30 DIAGNOSIS — M25.552 CHRONIC HIP PAIN, BILATERAL: ICD-10-CM

## 2019-09-30 DIAGNOSIS — M25.551 CHRONIC HIP PAIN, BILATERAL: ICD-10-CM

## 2019-09-30 DIAGNOSIS — M54.50 CHRONIC BILATERAL LOW BACK PAIN WITHOUT SCIATICA: ICD-10-CM

## 2019-09-30 DIAGNOSIS — G89.29 CHRONIC BILATERAL LOW BACK PAIN WITHOUT SCIATICA: ICD-10-CM

## 2019-09-30 DIAGNOSIS — G89.29 CHRONIC HIP PAIN, BILATERAL: ICD-10-CM

## 2019-09-30 NOTE — PROGRESS NOTES
Has arthritis of both hips and lumbar spine  And loss of disc ht in lumbar spine with osteopenia so need to update bone density  Notify pt and update via [employer]

## 2020-06-17 ENCOUNTER — HOSPITAL ENCOUNTER (OUTPATIENT)
Dept: MAMMOGRAPHY | Age: 60
Discharge: HOME | End: 2020-06-17
Attending: INTERNAL MEDICINE
Payer: COMMERCIAL

## 2020-06-17 ENCOUNTER — HOSPITAL ENCOUNTER (OUTPATIENT)
Dept: ULTRASOUND IMAGING | Age: 60
Discharge: HOME | End: 2020-06-17
Attending: SURGERY
Payer: COMMERCIAL

## 2020-06-17 DIAGNOSIS — M81.0 POSTMENOPAUSAL BONE LOSS: ICD-10-CM

## 2020-06-17 DIAGNOSIS — M85.80 OSTEOPENIA, UNSPECIFIED LOCATION: ICD-10-CM

## 2020-06-17 DIAGNOSIS — E04.2 MULTINODULAR GOITER: ICD-10-CM

## 2020-06-17 PROCEDURE — 77080 DXA BONE DENSITY AXIAL: CPT

## 2020-06-17 PROCEDURE — 76536 US EXAM OF HEAD AND NECK: CPT

## 2020-06-17 NOTE — PROGRESS NOTES
Has osteopenia - early bone loss changes but does not require medication  Need regular exercise and daily Vitamin D3   Copy to pt via [email or web address]

## 2020-09-04 ENCOUNTER — HOSPITAL ENCOUNTER (OUTPATIENT)
Dept: MAMMOGRAPHY | Age: 60
Discharge: HOME | End: 2020-09-04
Attending: INTERNAL MEDICINE
Payer: COMMERCIAL

## 2020-09-04 DIAGNOSIS — Z12.31 VISIT FOR SCREENING MAMMOGRAM: ICD-10-CM

## 2020-09-04 PROCEDURE — 77063 BREAST TOMOSYNTHESIS BI: CPT

## 2021-06-09 ENCOUNTER — TRANSCRIBE ORDER (OUTPATIENT)
Dept: SCHEDULING | Age: 61
End: 2021-06-09

## 2021-06-09 DIAGNOSIS — Z12.31 SCREENING MAMMOGRAM FOR HIGH-RISK PATIENT: Primary | ICD-10-CM

## 2021-09-11 ENCOUNTER — HOSPITAL ENCOUNTER (OUTPATIENT)
Dept: MAMMOGRAPHY | Age: 61
Discharge: HOME | End: 2021-09-11
Attending: INTERNAL MEDICINE
Payer: COMMERCIAL

## 2021-09-11 DIAGNOSIS — Z12.31 SCREENING MAMMOGRAM FOR HIGH-RISK PATIENT: ICD-10-CM

## 2021-09-11 PROCEDURE — 77063 BREAST TOMOSYNTHESIS BI: CPT

## 2021-11-11 PROBLEM — N81.10 VAGINAL PROLAPSE: Status: ACTIVE | Noted: 2021-11-11

## 2022-01-25 ENCOUNTER — HOSPITAL ENCOUNTER (OUTPATIENT)
Dept: SURGERY | Age: 62
Discharge: HOME | End: 2022-01-25
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
## 2022-01-31 ENCOUNTER — ANESTHESIA EVENT (OUTPATIENT)
Dept: SURGERY | Age: 62
End: 2022-01-31
Payer: COMMERCIAL

## 2022-01-31 VITALS — BODY MASS INDEX: 27.64 KG/M2 | HEIGHT: 63 IN | WEIGHT: 156 LBS

## 2022-01-31 NOTE — PERIOP NOTES
Patient verified name and . Order for consent  found in EHR; patient verifies procedure. Spoke with Emely Thomas in scheduling to verify procedure. Verito to verify procedure with order for consents. Type 1B surgery, Phone assessment complete. Orders  received. Labs per surgeon: none  Labs per anesthesia protocol: none    Patient COVID test date completed at [facility] [address], [place]. Patient answered medical/surgical history questions at their best of ability. All prior to admission medications documented in [facility]. Patient instructed to take the following medications the day of surgery according to anesthesia guidelines with a small sip of water: Klonopin. Hold all vitamins 7 days prior to surgery and NSAIDS 5 days prior to surgery. Prescription meds to hold: Vitamin C, Biotin,Vitamin D, Vitamin B12, Lutein, and Zinc.        Patient instructed on the following:    > Arrive at A Entrance, time of arrival to be called the day before by 1700  > NPO after midnight including gum, mints, and ice chips  > Responsible adult must drive patient to the hospital, stay during surgery, and patient will need supervision 24 hours after anesthesia  > Use antibacterial Soap in shower the night before surgery and on the morning of surgery  > All piercings must be removed prior to arrival.    > Leave all valuables (money and jewelry) at home but bring insurance card and ID on DOS.   > You may be required to pay a deductible or co-pay on the day of your procedure. You can pre-pay by calling [phone] if your surgery is at the [facility] or [phone] if your surgery is at the [facility]. > Do not wear make-up, nail polish, lotions, cologne, perfumes, powders, or oil on skin. Artificial nails are not permitted.

## 2022-02-01 ENCOUNTER — HOSPITAL ENCOUNTER (OUTPATIENT)
Age: 62
Setting detail: OUTPATIENT SURGERY
Discharge: HOME | End: 2022-02-01
Attending: OBSTETRICS & GYNECOLOGY | Admitting: OBSTETRICS & GYNECOLOGY
Payer: COMMERCIAL

## 2022-02-01 ENCOUNTER — ANESTHESIA (OUTPATIENT)
Dept: SURGERY | Age: 62
End: 2022-02-01
Payer: COMMERCIAL

## 2022-02-01 VITALS
OXYGEN SATURATION: 98 % | HEIGHT: 63 IN | HEART RATE: 65 BPM | SYSTOLIC BLOOD PRESSURE: 96 MMHG | WEIGHT: 158.2 LBS | RESPIRATION RATE: 16 BRPM | BODY MASS INDEX: 28.03 KG/M2 | TEMPERATURE: 97.8 F | DIASTOLIC BLOOD PRESSURE: 57 MMHG

## 2022-02-01 DIAGNOSIS — G89.18 POSTOPERATIVE PAIN: Primary | ICD-10-CM

## 2022-02-01 DIAGNOSIS — N81.10 VAGINAL PROLAPSE: ICD-10-CM

## 2022-02-01 LAB
COVID-19 RAPID TEST, COVR: NOT DETECTED
SOURCE, COVRS: NORMAL

## 2022-02-01 PROCEDURE — 58400 SUSPENSION OF UTERUS: CPT | Performed by: OBSTETRICS & GYNECOLOGY

## 2022-02-01 PROCEDURE — 77030019927 HC TBNG IRR CYSTO BAXT -A: Performed by: OBSTETRICS & GYNECOLOGY

## 2022-02-01 PROCEDURE — 57285 REPAIR PARAVAG DEFECT VAG: CPT | Performed by: OBSTETRICS & GYNECOLOGY

## 2022-02-01 PROCEDURE — 77030040922 HC BLNKT HYPOTHRM STRY -A: Performed by: ANESTHESIOLOGY

## 2022-02-01 PROCEDURE — 74011250637 HC RX REV CODE- 250/637: Performed by: OBSTETRICS & GYNECOLOGY

## 2022-02-01 PROCEDURE — 74011250636 HC RX REV CODE- 250/636: Performed by: OBSTETRICS & GYNECOLOGY

## 2022-02-01 PROCEDURE — 77030002880 HC SUT CAPIO BSC -B: Performed by: OBSTETRICS & GYNECOLOGY

## 2022-02-01 PROCEDURE — 77030008462 HC STPLR SKN PROX J&J -A: Performed by: OBSTETRICS & GYNECOLOGY

## 2022-02-01 PROCEDURE — 77030021052 HC RNG RETRCTR STAY COOP -A: Performed by: OBSTETRICS & GYNECOLOGY

## 2022-02-01 PROCEDURE — 77030031139 HC SUT VCRL2 J&J -A: Performed by: OBSTETRICS & GYNECOLOGY

## 2022-02-01 PROCEDURE — 74011000250 HC RX REV CODE- 250: Performed by: NURSE ANESTHETIST, CERTIFIED REGISTERED

## 2022-02-01 PROCEDURE — 74011250636 HC RX REV CODE- 250/636: Performed by: ANESTHESIOLOGY

## 2022-02-01 PROCEDURE — 76210000006 HC OR PH I REC 0.5 TO 1 HR: Performed by: OBSTETRICS & GYNECOLOGY

## 2022-02-01 PROCEDURE — 74011250637 HC RX REV CODE- 250/637: Performed by: ANESTHESIOLOGY

## 2022-02-01 PROCEDURE — 77030040830 HC CATH URETH FOL MDII -A: Performed by: OBSTETRICS & GYNECOLOGY

## 2022-02-01 PROCEDURE — 74011250636 HC RX REV CODE- 250/636: Performed by: NURSE ANESTHETIST, CERTIFIED REGISTERED

## 2022-02-01 PROCEDURE — 77030040361 HC SLV COMPR DVT MDII -B: Performed by: OBSTETRICS & GYNECOLOGY

## 2022-02-01 PROCEDURE — 87635 SARS-COV-2 COVID-19 AMP PRB: CPT

## 2022-02-01 PROCEDURE — 77030008064 HC RNG RETRCTR COOP -B: Performed by: OBSTETRICS & GYNECOLOGY

## 2022-02-01 PROCEDURE — 77030018836 HC SOL IRR NACL ICUM -A: Performed by: OBSTETRICS & GYNECOLOGY

## 2022-02-01 PROCEDURE — 2709999900 HC NON-CHARGEABLE SUPPLY: Performed by: OBSTETRICS & GYNECOLOGY

## 2022-02-01 PROCEDURE — 76010000162 HC OR TIME 1.5 TO 2 HR INTENSV-TIER 1: Performed by: OBSTETRICS & GYNECOLOGY

## 2022-02-01 PROCEDURE — 77030037088 HC TUBE ENDOTRACH ORAL NSL COVD-A: Performed by: ANESTHESIOLOGY

## 2022-02-01 PROCEDURE — 77030019908 HC STETH ESOPH SIMS -A: Performed by: ANESTHESIOLOGY

## 2022-02-01 PROCEDURE — 77030039425 HC BLD LARYNG TRULITE DISP TELE -A: Performed by: ANESTHESIOLOGY

## 2022-02-01 PROCEDURE — 74011000250 HC RX REV CODE- 250: Performed by: OBSTETRICS & GYNECOLOGY

## 2022-02-01 PROCEDURE — 76060000034 HC ANESTHESIA 1.5 TO 2 HR: Performed by: OBSTETRICS & GYNECOLOGY

## 2022-02-01 PROCEDURE — 77030002966 HC SUT PDS J&J -A: Performed by: OBSTETRICS & GYNECOLOGY

## 2022-02-01 PROCEDURE — 57282 COLPOPEXY EXTRAPERITONEAL: CPT | Performed by: OBSTETRICS & GYNECOLOGY

## 2022-02-01 PROCEDURE — 76210000023 HC REC RM PH II 2 TO 2.5 HR: Performed by: OBSTETRICS & GYNECOLOGY

## 2022-02-01 RX ORDER — OXYCODONE HYDROCHLORIDE 5 MG/1
10 TABLET ORAL
Status: COMPLETED | OUTPATIENT
Start: 2022-02-01 | End: 2022-02-01

## 2022-02-01 RX ORDER — HYDROMORPHONE HYDROCHLORIDE 2 MG/ML
0.5 INJECTION, SOLUTION INTRAMUSCULAR; INTRAVENOUS; SUBCUTANEOUS
Status: DISCONTINUED | OUTPATIENT
Start: 2022-02-01 | End: 2022-02-01 | Stop reason: HOSPADM

## 2022-02-01 RX ORDER — SODIUM CHLORIDE, SODIUM LACTATE, POTASSIUM CHLORIDE, CALCIUM CHLORIDE 600; 310; 30; 20 MG/100ML; MG/100ML; MG/100ML; MG/100ML
75 INJECTION, SOLUTION INTRAVENOUS CONTINUOUS
Status: DISCONTINUED | OUTPATIENT
Start: 2022-02-01 | End: 2022-02-01 | Stop reason: HOSPADM

## 2022-02-01 RX ORDER — ACETAMINOPHEN 500 MG
1000 TABLET ORAL ONCE
Status: COMPLETED | OUTPATIENT
Start: 2022-02-01 | End: 2022-02-01

## 2022-02-01 RX ORDER — SODIUM CHLORIDE, SODIUM LACTATE, POTASSIUM CHLORIDE, CALCIUM CHLORIDE 600; 310; 30; 20 MG/100ML; MG/100ML; MG/100ML; MG/100ML
100 INJECTION, SOLUTION INTRAVENOUS CONTINUOUS
Status: DISCONTINUED | OUTPATIENT
Start: 2022-02-01 | End: 2022-02-01 | Stop reason: HOSPADM

## 2022-02-01 RX ORDER — LIDOCAINE HYDROCHLORIDE AND EPINEPHRINE 10; 10 MG/ML; UG/ML
INJECTION, SOLUTION INFILTRATION; PERINEURAL AS NEEDED
Status: DISCONTINUED | OUTPATIENT
Start: 2022-02-01 | End: 2022-02-01 | Stop reason: HOSPADM

## 2022-02-01 RX ORDER — LIDOCAINE HYDROCHLORIDE 20 MG/ML
INJECTION, SOLUTION EPIDURAL; INFILTRATION; INTRACAUDAL; PERINEURAL AS NEEDED
Status: DISCONTINUED | OUTPATIENT
Start: 2022-02-01 | End: 2022-02-01 | Stop reason: HOSPADM

## 2022-02-01 RX ORDER — ONDANSETRON 2 MG/ML
4 INJECTION INTRAMUSCULAR; INTRAVENOUS ONCE
Status: DISCONTINUED | OUTPATIENT
Start: 2022-02-01 | End: 2022-02-01 | Stop reason: HOSPADM

## 2022-02-01 RX ORDER — OXYCODONE HYDROCHLORIDE 5 MG/1
5 TABLET ORAL
Status: DISCONTINUED | OUTPATIENT
Start: 2022-02-01 | End: 2022-02-01 | Stop reason: HOSPADM

## 2022-02-01 RX ORDER — KETAMINE HYDROCHLORIDE 50 MG/ML
INJECTION, SOLUTION INTRAMUSCULAR; INTRAVENOUS AS NEEDED
Status: DISCONTINUED | OUTPATIENT
Start: 2022-02-01 | End: 2022-02-01 | Stop reason: HOSPADM

## 2022-02-01 RX ORDER — DIPHENHYDRAMINE HYDROCHLORIDE 50 MG/ML
12.5 INJECTION, SOLUTION INTRAMUSCULAR; INTRAVENOUS ONCE
Status: DISCONTINUED | OUTPATIENT
Start: 2022-02-01 | End: 2022-02-01 | Stop reason: HOSPADM

## 2022-02-01 RX ORDER — NALOXONE HYDROCHLORIDE 0.4 MG/ML
0.1 INJECTION, SOLUTION INTRAMUSCULAR; INTRAVENOUS; SUBCUTANEOUS AS NEEDED
Status: DISCONTINUED | OUTPATIENT
Start: 2022-02-01 | End: 2022-02-01 | Stop reason: HOSPADM

## 2022-02-01 RX ORDER — MIDAZOLAM HYDROCHLORIDE 1 MG/ML
2 INJECTION, SOLUTION INTRAMUSCULAR; INTRAVENOUS ONCE
Status: COMPLETED | OUTPATIENT
Start: 2022-02-01 | End: 2022-02-01

## 2022-02-01 RX ORDER — CEFAZOLIN SODIUM/WATER 2 G/20 ML
2 SYRINGE (ML) INTRAVENOUS ONCE
Status: COMPLETED | OUTPATIENT
Start: 2022-02-01 | End: 2022-02-01

## 2022-02-01 RX ORDER — ONDANSETRON 4 MG/1
4 TABLET, ORALLY DISINTEGRATING ORAL
Qty: 10 TABLET | Refills: 0 | Status: SHIPPED | OUTPATIENT
Start: 2022-02-01 | End: 2022-02-11 | Stop reason: ALTCHOICE

## 2022-02-01 RX ORDER — DEXAMETHASONE SODIUM PHOSPHATE 4 MG/ML
INJECTION, SOLUTION INTRA-ARTICULAR; INTRALESIONAL; INTRAMUSCULAR; INTRAVENOUS; SOFT TISSUE AS NEEDED
Status: DISCONTINUED | OUTPATIENT
Start: 2022-02-01 | End: 2022-02-01 | Stop reason: HOSPADM

## 2022-02-01 RX ORDER — HEPARIN SODIUM 5000 [USP'U]/ML
5000 INJECTION, SOLUTION INTRAVENOUS; SUBCUTANEOUS EVERY 12 HOURS
Status: DISCONTINUED | OUTPATIENT
Start: 2022-02-01 | End: 2022-02-01 | Stop reason: HOSPADM

## 2022-02-01 RX ORDER — FENTANYL CITRATE 50 UG/ML
INJECTION, SOLUTION INTRAMUSCULAR; INTRAVENOUS AS NEEDED
Status: DISCONTINUED | OUTPATIENT
Start: 2022-02-01 | End: 2022-02-01 | Stop reason: HOSPADM

## 2022-02-01 RX ORDER — DOCUSATE SODIUM 100 MG/1
100 CAPSULE, LIQUID FILLED ORAL 2 TIMES DAILY
Qty: 60 CAPSULE | Refills: 2 | Status: SHIPPED | OUTPATIENT
Start: 2022-02-01 | End: 2022-05-02

## 2022-02-01 RX ORDER — PHENAZOPYRIDINE HYDROCHLORIDE 95 MG/1
95 TABLET ORAL ONCE
Status: COMPLETED | OUTPATIENT
Start: 2022-02-01 | End: 2022-02-01

## 2022-02-01 RX ORDER — ROCURONIUM BROMIDE 10 MG/ML
INJECTION, SOLUTION INTRAVENOUS AS NEEDED
Status: DISCONTINUED | OUTPATIENT
Start: 2022-02-01 | End: 2022-02-01 | Stop reason: HOSPADM

## 2022-02-01 RX ORDER — MIDAZOLAM HYDROCHLORIDE 1 MG/ML
2 INJECTION, SOLUTION INTRAMUSCULAR; INTRAVENOUS
Status: DISCONTINUED | OUTPATIENT
Start: 2022-02-01 | End: 2022-02-01 | Stop reason: HOSPADM

## 2022-02-01 RX ORDER — PROPOFOL 10 MG/ML
INJECTION, EMULSION INTRAVENOUS AS NEEDED
Status: DISCONTINUED | OUTPATIENT
Start: 2022-02-01 | End: 2022-02-01 | Stop reason: HOSPADM

## 2022-02-01 RX ORDER — POLYETHYLENE GLYCOL 3350 17 G/17G
17 POWDER, FOR SOLUTION ORAL DAILY
Qty: 30 PACKET | Refills: 3 | Status: SHIPPED | OUTPATIENT
Start: 2022-02-01

## 2022-02-01 RX ORDER — HYDROMORPHONE HYDROCHLORIDE 2 MG/1
2 TABLET ORAL
Qty: 10 TABLET | Refills: 0 | Status: SHIPPED | OUTPATIENT
Start: 2022-02-01 | End: 2022-02-04

## 2022-02-01 RX ORDER — FENTANYL CITRATE 50 UG/ML
100 INJECTION, SOLUTION INTRAMUSCULAR; INTRAVENOUS ONCE
Status: DISCONTINUED | OUTPATIENT
Start: 2022-02-01 | End: 2022-02-01 | Stop reason: HOSPADM

## 2022-02-01 RX ORDER — LIDOCAINE HYDROCHLORIDE 10 MG/ML
0.1 INJECTION INFILTRATION; PERINEURAL AS NEEDED
Status: DISCONTINUED | OUTPATIENT
Start: 2022-02-01 | End: 2022-02-01 | Stop reason: HOSPADM

## 2022-02-01 RX ADMIN — ROCURONIUM BROMIDE 40 MG: 50 INJECTION, SOLUTION INTRAVENOUS at 08:21

## 2022-02-01 RX ADMIN — HYDROMORPHONE HYDROCHLORIDE 0.5 MG: 2 INJECTION, SOLUTION INTRAMUSCULAR; INTRAVENOUS; SUBCUTANEOUS at 10:07

## 2022-02-01 RX ADMIN — FENTANYL CITRATE 50 MCG: 50 INJECTION INTRAMUSCULAR; INTRAVENOUS at 08:21

## 2022-02-01 RX ADMIN — PHENYLEPHRINE HYDROCHLORIDE 100 MCG: 10 INJECTION INTRAVENOUS at 09:30

## 2022-02-01 RX ADMIN — SODIUM CHLORIDE, SODIUM LACTATE, POTASSIUM CHLORIDE, AND CALCIUM CHLORIDE: 600; 310; 30; 20 INJECTION, SOLUTION INTRAVENOUS at 09:53

## 2022-02-01 RX ADMIN — FENTANYL CITRATE 50 MCG: 50 INJECTION INTRAMUSCULAR; INTRAVENOUS at 08:40

## 2022-02-01 RX ADMIN — KETAMINE HYDROCHLORIDE 20 MG: 50 INJECTION, SOLUTION INTRAMUSCULAR; INTRAVENOUS at 08:33

## 2022-02-01 RX ADMIN — KETAMINE HYDROCHLORIDE 10 MG: 50 INJECTION, SOLUTION INTRAMUSCULAR; INTRAVENOUS at 09:22

## 2022-02-01 RX ADMIN — MIDAZOLAM 2 MG: 1 INJECTION INTRAMUSCULAR; INTRAVENOUS at 07:44

## 2022-02-01 RX ADMIN — LIDOCAINE HYDROCHLORIDE 80 MG: 20 INJECTION, SOLUTION EPIDURAL; INFILTRATION; INTRACAUDAL; PERINEURAL at 08:21

## 2022-02-01 RX ADMIN — PROPOFOL 200 MG: 10 INJECTION, EMULSION INTRAVENOUS at 08:21

## 2022-02-01 RX ADMIN — PHENYLEPHRINE HYDROCHLORIDE 50 MCG: 10 INJECTION INTRAVENOUS at 09:21

## 2022-02-01 RX ADMIN — HEPARIN SODIUM 5000 UNITS: 5000 INJECTION INTRAVENOUS; SUBCUTANEOUS at 07:40

## 2022-02-01 RX ADMIN — DEXAMETHASONE SODIUM PHOSPHATE 10 MG: 4 INJECTION, SOLUTION INTRAMUSCULAR; INTRAVENOUS at 08:40

## 2022-02-01 RX ADMIN — PHENYLEPHRINE HYDROCHLORIDE 100 MCG: 10 INJECTION INTRAVENOUS at 09:03

## 2022-02-01 RX ADMIN — SODIUM CHLORIDE, SODIUM LACTATE, POTASSIUM CHLORIDE, AND CALCIUM CHLORIDE 100 ML/HR: 600; 310; 30; 20 INJECTION, SOLUTION INTRAVENOUS at 07:37

## 2022-02-01 RX ADMIN — OXYCODONE HYDROCHLORIDE 10 MG: 5 TABLET ORAL at 10:22

## 2022-02-01 RX ADMIN — Medication 2 G: at 08:30

## 2022-02-01 RX ADMIN — HYDROMORPHONE HYDROCHLORIDE 0.5 MG: 2 INJECTION, SOLUTION INTRAMUSCULAR; INTRAVENOUS; SUBCUTANEOUS at 10:19

## 2022-02-01 RX ADMIN — ACETAMINOPHEN 1000 MG: 500 TABLET, FILM COATED ORAL at 07:40

## 2022-02-01 RX ADMIN — URINARY PAIN RELIEF 95 MG: 95 TABLET ORAL at 07:40

## 2022-02-01 NOTE — OP NOTES
NAMES OF PROCEDURES:   1. Bilateral vaginal and paravaginal repairs   2. Sacrospinous ligament fixation   3. Uterine Suspension  4. Cystoscopy       PREOPERATIVE DIAGNOSES: Anterior Wall Prolapse, Vaginal Vault Prolapse. POSTOPERATIVE DIAGNOSIS: Anterior Wall Prolapse, Vaginal Vault Prolapse. IVF: 1000cc    EBL:25cc    Urine output: 200cc    No responsible provider has been recorded for the case. INDICATIONS FOR PROCEDURE: This is a 29-year-old female with a history of worsening symptomatic pelvic organ prolapse who desired definitive surgical treatment after being extensively counseled on the risks, benefits, indications, and alternatives of the procedure. Risks reviewed include bleeding, infection, damage to the bladder, ureters, urethra, bowel, blood vessels, nerves, postoperative urinary retention, postoperative incontinence, pelvic pain, dyspareunia, recurrence, mesh erosion, anesthetic complications, and death. The patient expressed understanding and informed consent was obtained. FINDINGS: There was spill noted from both ureteral orifices and normal-appearing urine. No evidence of any damage to the bladder or urethra. No stones, masses or ulcerations in the bladder or urethra. Normal rectal exam.    DESCRIPTION OF PROCEDURE: The patient was brought to the operating room, where she was placed in the supine position. Once in the supine position, she was placed under general anesthesia with an endotracheal tube. She was then placed in the dorsal lithotomy position with the [facility] stirr[facility], making sure that her ankles, knees and hips were in alignment toward the contralateral shoulders. She was prepped and draped in normal sterile fashion. A 16-Tajik Contreras catheter was placed in the patient's bladder. L[place] Star retractor was placed on the patient's pelvis, with hooks along the vaginal epithelium.  Two Allis clamps were placed along the anterior wall of the vagina, one at the UVJ, and the other at the apex of the vagina. Local, consisting of 100 mL of injectable saline, 30 mL of 0.25% Marcaine and 30 mL of 1% lidocaine with epinephrine, was then mixed together. A portion of this was then injected into the anterior wall. Using a scalpel, an incision was made between the 2 Allis clamps. Using the Metzenbaum scissors, the pubocervical fascia was sharply dissected toward the pubic ramus on both left and right sides. Using my finger, dissection was carried through into the space of Retzius, palpating the ischial spine to the arcus tendineus fascia pelvis and the sacrospinous ligaments. This was done on both the left and right sides. Using the Capio device, two PDS suture was placed through the right sacrospinous ligament. These sutures were placed approximately 2cm away from the ischial spines. Once this was done, then a total of 3 PDS sutures were placed through the arcus tendineus fascia pelvis. The first suture was placed approximately 2 cm above the ischial spines, and then each suture was placed one cm away from the other. This was repeated on the left with 3 sutures through the arcus tendineus fascia pelvis. Next, the 6 lateral sutures were placed through the connective tissue lateral to the bladder, and then the sacrospinous sutures were placed through the apex of the vagina, making sure not to go through-and-through the vaginal epithelium. Once this was done, the area was irrigated with sterile water. Next, the left uterosacral ligament was shortened with a 2-0 vicryl to suspend the uterus on the left. The 8 sutures were then tied into place, bringing the apex of the vagina to its proper location and the anterior compartment to its proper location. Once this was done, the area was irrigated with sterile water. The vaginal epithelium was then closed using 2-0 Vicryl in a running fashion.  The Contreras catheter was then removed, and a 70-degree cystoscope was introduced into the patient's urethra and bladder. Findings were as above. The cystoscope was then removed, and the Contreras catheter was placed back in. Sponge, lap and needle counts were correct x2. The patient was then awakened and brought to the PACU in a stable condition.

## 2022-02-01 NOTE — PERIOP NOTES
Dr. Missy Araujo notified of inability of patient to void prior to discharge and that 16F salguero was placed with immediate return of 325ml of pink tinged urine.

## 2022-02-01 NOTE — PERIOP NOTES
300 ml of sterile water placed in the bladder via salguero catheter. Balloon deflated and catheter removed. Patient instructed that she will need to void 150 ml prior to discharge.

## 2022-02-01 NOTE — ROUTINE PROCESS
Dr Emeli Hdz notied that patient's HR drops into the low 40s occasionally. No orders received. Pt is asymptomatic, no ekg changes noted.  Cornelius, rn

## 2022-02-01 NOTE — ANESTHESIA POSTPROCEDURE EVALUATION
Procedure(s):  ERAS/ PARAVAGINAL DEFECT REPAIR  SACROSPINOUS LIGAMENT FIXATION.     general    Anesthesia Post Evaluation      Multimodal analgesia: multimodal analgesia used between 6 hours prior to anesthesia start to PACU discharge  Patient location during evaluation: bedside  Patient participation: complete - patient participated  Level of consciousness: responsive to verbal stimuli  Pain management: adequate  Airway patency: patent  Anesthetic complications: no  Cardiovascular status: hemodynamically stable  Respiratory status: spontaneous ventilation  Hydration status: stable    Final Post Anesthesia Temperature Assessment:  Normothermia (36.0-37.5 degrees C)      INITIAL Post-op Vital signs:   Vitals Value Taken Time   BP 96/57 02/01/22 1047   Temp 36.6 °C (97.8 °F) 02/01/22 0958   Pulse 65 02/01/22 1047   Resp 16 02/01/22 1047   SpO2 98 % 02/01/22 1047

## 2022-02-01 NOTE — PERIOP NOTES
Patient in phase II recovery for 120 minutes and attempted to void twice without success. Contreras catheter 16F placed using sterile technique with immediate return of 325 ml of pink tinged urine with one blood clot noted.

## 2022-02-01 NOTE — DISCHARGE INSTRUCTIONS
Caring for yourself after Vaginal Prolapse Repair or Sling      General care: You will be sore and it will take time to heal after surgery; as a rule, you will gradually get better and need less pain medication on a daily basis. Diet: Start with easy, bland foods and resume your regular diet as tolerated. Avoid foods that are greasy or give you gas. Keep well hydrated, drinking at least 8 glasses of fluids a day. Do not drink any alcohol while taking narcotic pain medications. Activity: You should take short walks frequently after surgery, but no strenuous activity. You may climb stairs, slowly and carefully. In addition to the surgical reconstruction you just underwent, what you do or should we say dont do is as important in the success of your repair. We recommend no heavy housework for the first 6-8 weeks. No lifting greater than 5-10 pounds for at least 6 weeks, although the more heavy lifting you do in general can compromise our repair. You may drive in general in about 1-2 weeks. DO NOT DRIVE WHILE TAKING NARCOTIC MEDICATION . Also, do not drive until you are moving (standing, sitting, walking) easily without pain or hesitation. You may use your stairs at home after discharge, increasing as the days go on. This is all designed with the theory of reducing the amount of abdominal straining which can lead to a longer lasting surgical correction. No bathing or swimming but you may shower. Listen to your body. Paullette Rakes if you are receiving any signals that you are doing too much (pain, pulling, bleeding), then stop doing it!! Wound care: You may shower after your surgery. Your incisions are in the vagina. You may have small incisions in the fold of your thigh or just above your pubic bone. These are closed with sterile skin glue and will feel like a scab. They will fall off in time. Do not pick at them. You may also have sutures between your rectum and vagina. These sutures will dissolve on their own.  If there is any irritation, swelling, or worsening redness of your surgical wounds, please call the office. You may use ice packs on and off (no more than 20 minutes on at a time) for the first 72 hours. You will have some vaginal bleeding tapering off to spotting for up to several weeks. If it becomes heavier than you would have expected please give us a call. Other limitations: Do not put anything in the vagina (do not have sex, douche, or use tampons) until cleared by your doctor. Do not soak in a bath, pool, hot tub, or the ocean for at least 2 weeks or until your doctor says it is okay. When to call your doctor: If you develop a fever >101, chills, nausea, vomiting, problems urinating, increasing abdominal pain, or concerns with your incisions, call your doctor. If you experience heavy vaginal bleeding or large blood clots, call your doctor; light spotting after surgery is normal and is a sign of healing, even 3-4 weeks after surgery. If you have swelling, redness or pain in your legs, call your doctor. If you have trouble breathing, chest pain, or any other emergency, you should come in to our Emergency triage area and/or call 911. If, at any time, you have questions or concerns, worsening pain or belly pain, you should feel free to call your doctor. Pain control: Continue narcotic pain medications as prescribed by your doctor as needed. Ibuprofen (Advil, Motrin) should be the main medication you use for pain control unless otherwise instructed by your doctor. Take ibuprofen with food, and do not take more than is recommended or prescribed. Please use caution if taking extra Acetaminophen (Tylenol) as most narcotic medications already contain this, and taking too much is dangerous. Please call our office or talk to your pharmacist if you have any questions. Bowel Regimen: You may not have a bowel movement for several days post operatively.  Please keep bowl movements soft by adding extra fiber to your diet and drinking plenty of fluids. Please supplement that with:    o Colace 100MG- one tablet 2 times per day (other stool softener is acceptable)  o Miralax powder (laxative) - one tablespoon dissolved in 8 ounce of water of juice every day    If you are unable to have a bowel movement by the 4th or 5th day after your operation, please try some Milk of magnesium or alternatively you may call the office. Once bowel movements are regular and easy, you may gradually wean yourself off of the laxatives and stool softeners. Follow-up appointment: Please call the office for an appointment to see your doctors approximately four weeks after your surgery. Please call the office sooner if you have any questions or concerns. Catheter care (if applicable): if you have had difficulty voiding after the surgery or if your doctor feels it is best you may be sent home with a catheter. If so, rest assured that this is a short term problem. You will be given a leg bad and a large overnight bag on discharge and the appropriate education to care for both catheters. You may shower with the catheter in place. Please call the office to schedule a time to come in within the next few days to have the catheter removed. MEDICATION INTERACTION:  During your procedure you potentially received a medication or medications which may reduce the effectiveness of oral contraceptives. Please consider other forms of contraception for 1 month following your procedure if you are currently using oral contraceptives as your primary form of birth control.  In addition to this, we recommend continuing your oral contraceptive as prescribed, unless otherwise instructed by your physician, during this time    After general anesthesia or intravenous sedation, for 24 hours or while taking prescription Narcotics:  · Limit your activities  · A responsible adult needs to be with you for the next 24 hours  · Do not drive and operate hazardous machinery  · Do not make important personal or business decisions  · Do not drink alcoholic beverages  · If you have not urinated within 8 hours after discharge, please contact your surgeon on call. · If you have sleep apnea and have a CPAP machine, please use it for all naps and sleeping. · Please use caution when taking narcotics and any of your home medications that may cause drowsiness. *  Please give a list of your current medications to your Primary Care Provider. *  Please update this list whenever your medications are discontinued, doses are      changed, or new medications (including over-the-counter products) are added. *  Please carry medication information at all times in case of emergency situations. These are general instructions for a healthy lifestyle:  No smoking/ No tobacco products/ Avoid exposure to second hand smoke  Surgeon General's Warning:  Quitting smoking now greatly reduces serious risk to your health. Obesity, smoking, and sedentary lifestyle greatly increases your risk for illness  A healthy diet, regular physical exercise & weight monitoring are important for maintaining a healthy lifestyle    You may be retaining fluid if you have a history of heart failure or if you experience any of the following symptoms:  Weight gain of 3 pounds or more overnight or 5 pounds in a week, increased swelling in our hands or feet or shortness of breath while lying flat in bed. Please call your doctor as soon as you notice any of these symptoms; do not wait until your next office visit.

## 2022-02-01 NOTE — ANESTHESIA PREPROCEDURE EVALUATION
Anesthetic History   No history of anesthetic complications            Review of Systems / Medical History  Patient summary reviewed and pertinent labs reviewed    Pulmonary  Within defined limits                 Neuro/Psych         Psychiatric history (anxiety)     Cardiovascular                  Exercise tolerance: >4 METS  Comments: RBBB   GI/Hepatic/Renal  Within defined limits              Endo/Other  Within defined limits           Other Findings              Physical Exam    Airway  Mallampati: I  TM Distance: 4 - 6 cm  Neck ROM: normal range of motion   Mouth opening: Normal     Cardiovascular    Rhythm: regular  Rate: normal         Dental  No notable dental hx       Pulmonary  Breath sounds clear to auscultation               Abdominal         Other Findings            Anesthetic Plan    ASA: 2  Anesthesia type: general          Induction: Intravenous  Anesthetic plan and risks discussed with: Patient

## 2022-02-10 ENCOUNTER — HOSPITAL ENCOUNTER (OUTPATIENT)
Dept: ULTRASOUND IMAGING | Age: 62
Discharge: HOME | End: 2022-02-10
Attending: NURSE PRACTITIONER
Payer: COMMERCIAL

## 2022-02-10 ENCOUNTER — HOSPITAL ENCOUNTER (OUTPATIENT)
Dept: GENERAL RADIOLOGY | Age: 62
Discharge: HOME | End: 2022-02-10
Attending: NURSE PRACTITIONER
Payer: COMMERCIAL

## 2022-02-10 ENCOUNTER — HOSPITAL ENCOUNTER (OUTPATIENT)
Dept: LAB | Age: 62
Discharge: HOME | End: 2022-02-10
Payer: COMMERCIAL

## 2022-02-10 DIAGNOSIS — M25.551 RIGHT HIP PAIN: ICD-10-CM

## 2022-02-10 DIAGNOSIS — R74.8 ABNORMAL LIVER ENZYMES: ICD-10-CM

## 2022-02-10 DIAGNOSIS — M54.41 ACUTE RIGHT-SIDED LOW BACK PAIN WITH RIGHT-SIDED SCIATICA: ICD-10-CM

## 2022-02-10 LAB
ALBUMIN SERPL-MCNC: 2.2 G/DL (ref 3.2–4.6)
ALBUMIN/GLOB SERPL: 0.5 {RATIO} (ref 1.2–3.5)
ALP SERPL-CCNC: 593 U/L (ref 50–136)
ALT SERPL-CCNC: 95 U/L (ref 12–65)
ANION GAP SERPL CALC-SCNC: 7 MMOL/L (ref 7–16)
AST SERPL-CCNC: 23 U/L (ref 15–37)
BASOPHILS # BLD: 0.1 K/UL (ref 0–0.2)
BASOPHILS NFR BLD: 1 % (ref 0–2)
BILIRUB SERPL-MCNC: 0.3 MG/DL (ref 0.2–1.1)
BUN SERPL-MCNC: 19 MG/DL (ref 8–23)
CALCIUM SERPL-MCNC: 9 MG/DL (ref 8.3–10.4)
CHLORIDE SERPL-SCNC: 109 MMOL/L (ref 98–107)
CO2 SERPL-SCNC: 22 MMOL/L (ref 21–32)
CREAT SERPL-MCNC: 0.79 MG/DL (ref 0.6–1)
DIFFERENTIAL METHOD BLD: ABNORMAL
EOSINOPHIL # BLD: 0 K/UL (ref 0–0.8)
EOSINOPHIL NFR BLD: 0 % (ref 0.5–7.8)
ERYTHROCYTE [DISTWIDTH] IN BLOOD BY AUTOMATED COUNT: 13.9 % (ref 11.9–14.6)
GLOBULIN SER CALC-MCNC: 4.7 G/DL (ref 2.3–3.5)
GLUCOSE SERPL-MCNC: 135 MG/DL (ref 65–100)
HCT VFR BLD AUTO: 37.5 % (ref 35.8–46.3)
HGB BLD-MCNC: 12.4 G/DL (ref 11.7–15.4)
IMM GRANULOCYTES # BLD AUTO: 0.4 K/UL (ref 0–0.5)
IMM GRANULOCYTES NFR BLD AUTO: 2 % (ref 0–5)
LYMPHOCYTES # BLD: 1.5 K/UL (ref 0.5–4.6)
LYMPHOCYTES NFR BLD: 6 % (ref 13–44)
MCH RBC QN AUTO: 30.2 PG (ref 26.1–32.9)
MCHC RBC AUTO-ENTMCNC: 33.1 G/DL (ref 31.4–35)
MCV RBC AUTO: 91.5 FL (ref 79.6–97.8)
MONOCYTES # BLD: 1.1 K/UL (ref 0.1–1.3)
MONOCYTES NFR BLD: 4 % (ref 4–12)
NEUTS SEG # BLD: 23.6 K/UL (ref 1.7–8.2)
NEUTS SEG NFR BLD: 88 % (ref 43–78)
NRBC # BLD: 0 K/UL (ref 0–0.2)
PLATELET # BLD AUTO: 299 K/UL (ref 150–450)
PMV BLD AUTO: 9.1 FL (ref 9.4–12.3)
POTASSIUM SERPL-SCNC: 3.9 MMOL/L (ref 3.5–5.1)
PROT SERPL-MCNC: 6.9 G/DL (ref 6.3–8.2)
RBC # BLD AUTO: 4.1 M/UL (ref 4.05–5.2)
SODIUM SERPL-SCNC: 138 MMOL/L (ref 136–145)
WBC # BLD AUTO: 26.8 K/UL (ref 4.3–11.1)

## 2022-02-10 PROCEDURE — 36415 COLL VENOUS BLD VENIPUNCTURE: CPT

## 2022-02-10 PROCEDURE — 73502 X-RAY EXAM HIP UNI 2-3 VIEWS: CPT

## 2022-02-10 PROCEDURE — 72110 X-RAY EXAM L-2 SPINE 4/>VWS: CPT

## 2022-02-10 PROCEDURE — 80053 COMPREHEN METABOLIC PANEL: CPT

## 2022-02-10 PROCEDURE — 76700 US EXAM ABDOM COMPLETE: CPT

## 2022-02-10 PROCEDURE — 85025 COMPLETE CBC W/AUTO DIFF WBC: CPT

## 2022-02-11 PROBLEM — G89.18 POSTOPERATIVE PAIN: Status: ACTIVE | Noted: 2022-02-11

## 2022-02-11 PROBLEM — D70.9 NEUTROPENIA (HCC): Status: ACTIVE | Noted: 2022-02-11

## 2022-03-18 PROBLEM — M79.18 MUSCLE ACHE OF EXTREMITY: Status: ACTIVE | Noted: 2018-04-30

## 2022-03-18 PROBLEM — G89.18 POSTOPERATIVE PAIN: Status: ACTIVE | Noted: 2022-02-11

## 2022-03-18 PROBLEM — D70.9 NEUTROPENIA (HCC): Status: ACTIVE | Noted: 2022-02-11

## 2022-03-18 PROBLEM — R73.03 PREDIABETES: Status: ACTIVE | Noted: 2017-03-27

## 2022-03-19 PROBLEM — R92.2 DENSE BREASTS: Status: ACTIVE | Noted: 2018-09-28

## 2022-03-19 PROBLEM — N81.10 VAGINAL PROLAPSE: Status: ACTIVE | Noted: 2021-11-11

## 2022-03-19 PROBLEM — R92.30 DENSE BREASTS: Status: ACTIVE | Noted: 2018-09-28

## 2022-03-19 PROBLEM — E04.2 MULTINODULAR GOITER: Status: ACTIVE | Noted: 2018-02-01

## 2022-03-20 PROBLEM — Z80.3 FAMILY HISTORY OF BREAST CANCER IN FIRST DEGREE RELATIVE: Status: ACTIVE | Noted: 2018-09-28

## 2022-03-20 PROBLEM — I10 ESSENTIAL HYPERTENSION: Status: ACTIVE | Noted: 2018-06-06

## 2022-06-01 ENCOUNTER — NURSE ONLY (OUTPATIENT)
Dept: INTERNAL MEDICINE CLINIC | Facility: CLINIC | Age: 62
End: 2022-06-01
Payer: COMMERCIAL

## 2022-06-01 DIAGNOSIS — R35.0 URINARY FREQUENCY: Primary | ICD-10-CM

## 2022-06-01 DIAGNOSIS — R35.0 URINARY FREQUENCY: ICD-10-CM

## 2022-06-01 LAB
BILIRUBIN, URINE, POC: NEGATIVE
BLOOD URINE, POC: ABNORMAL
GLUCOSE URINE, POC: NEGATIVE
KETONES, URINE, POC: NEGATIVE
LEUKOCYTE ESTERASE, URINE, POC: ABNORMAL
NITRITE, URINE, POC: ABNORMAL
PH, URINE, POC: 6 (ref 4.6–8)
PROTEIN,URINE, POC: NEGATIVE
SPECIFIC GRAVITY, URINE, POC: 1.02 (ref 1–1.03)
URINALYSIS CLARITY, POC: ABNORMAL
URINALYSIS COLOR, POC: YELLOW
UROBILINOGEN, POC: 0.2

## 2022-06-01 PROCEDURE — 81003 URINALYSIS AUTO W/O SCOPE: CPT | Performed by: INTERNAL MEDICINE

## 2022-06-01 RX ORDER — CIPROFLOXACIN 500 MG/1
500 TABLET, FILM COATED ORAL 2 TIMES DAILY
Qty: 6 TABLET | Refills: 0 | Status: SHIPPED | OUTPATIENT
Start: 2022-06-01 | End: 2022-06-04

## 2022-06-01 NOTE — PROGRESS NOTES
Pt called with c/o back pain and burning with urination. Urine dip complete in lab with blood and leukocytes trace. Nitrates negative. Urine dip sent for C&S. Per Dr Monet Napoles, \"Rx for Cipro 500 mg, #6, 1 po twice daily sent to [facility]. \" Pt notified.  ([phone])

## 2022-06-03 LAB
BACTERIA SPEC CULT: NORMAL
BACTERIA SPEC CULT: NORMAL
SERVICE CMNT-IMP: NORMAL

## 2022-06-06 ENCOUNTER — OFFICE VISIT (OUTPATIENT)
Dept: UROGYNECOLOGY | Age: 62
End: 2022-06-06

## 2022-06-06 DIAGNOSIS — N81.10 VAGINAL PROLAPSE: ICD-10-CM

## 2022-06-06 PROCEDURE — 99213 OFFICE O/P EST LOW 20 MIN: CPT | Performed by: OBSTETRICS & GYNECOLOGY

## 2022-06-06 NOTE — PATIENT INSTRUCTIONS
She is doing well with no complaints. She desires no further urogynecologic treatment at this time. I am releasing her back to the care of her referring doctor.

## 2022-06-06 NOTE — ASSESSMENT & PLAN NOTE
You are now 4 months postop:  You no longer have lifting, or bathing restrictions. You should be back to your normal activities of daily living. I am releasing you back to your primary care provider. Should your symptoms recur, or new symptoms arise, please do not hesitate to call our office for an appointment.

## 2022-06-06 NOTE — PROGRESS NOTES
[place] [id number] [facility]  [address]  Dept: [phone]        PCP:  Ana María Prince MD    6/6/2022    Chief Complaint   Patient presents with    Post-Op Check     4 month post op            HPI:  Shalom Go is here for her postop check. She had a VPVR, Sacrospinous ligament fixation, Uterine Suspension, and Cysto on 2/1/22. She is doing very well today. She denies fevers, chills nausea, vomiting, chest pain, shortness of breath. She is ambulating, tolerating a regular diet, and pain is well controlled. She does not have any vaginal bleeding and is currently back to doing her normal activities of daily living. She does not have any difficulty with urination or bowel movements. She does not have any signs or symptoms of POP recurrence. Patient states she has had some light discharge since surgery, but it is not an every day issue. No results found for this visit on 06/06/22. There were no vitals taken for this visit. Exam  Incisions:  Clean, Dry, and Intact. Healing well. Vulva:    Normal. No lesions  Bartholin's Gland:  Bilateral , Normal, nontender  Skenes Gland:  Bilateral, Normal, nontender   Clitoris:  Normal.   Introitus:    Normal.   Urethral Meatus:  Normal appearing, normal size, no lesions, no prolapse  Urethra:  No masses, no tenderness  Vagina:  No atrophy, no discharge, no lesions  Cervix:  No lesions, no discharge  Uterus:  No tenderness, normal mobility   Adnexa:   No masses palpated, no tenderness  Bladder:  No tenderness, no masses palpated  Perineum:  Normal, no lesions    Rectal   Anorectal Exam: No hemorrhoids and no masses or lesions of the perineum       POP-Q: (Pelvic Organ Prolapse - Quantification Exam):    -2 Aa -2 Ba -7 C  1 gh  2 pb 7 tvl  -3 Ap -3 Bp X D    Stage: 1      1. Vaginal prolapse  Assessment & Plan: You are now 4 months postop:  You no longer have lifting, or bathing restrictions.  You should be back to your normal activities of daily living. I am releasing you back to your primary care provider. Should your symptoms recur, or new symptoms arise, please do not hesitate to call our office for an appointment. No follow-up provider specified. On this date 6/6/2022 I have spent 20 minutes reviewing previous notes, test results and face to face with the patient discussing the diagnosis and importance of compliance with the treatment plan as well as documenting on the day of the visit.       Cate Bender, DO

## 2022-07-20 RX ORDER — DOXYCYCLINE HYCLATE 100 MG/1
100 CAPSULE ORAL DAILY
Qty: 30 CAPSULE | Refills: 1 | Status: SHIPPED | OUTPATIENT
Start: 2022-07-20 | End: 2022-07-30

## 2022-09-17 ENCOUNTER — HOSPITAL ENCOUNTER (OUTPATIENT)
Dept: MAMMOGRAPHY | Age: 62
Discharge: HOME | End: 2022-09-20
Payer: COMMERCIAL

## 2022-09-17 DIAGNOSIS — Z12.31 VISIT FOR SCREENING MAMMOGRAM: ICD-10-CM

## 2022-09-17 PROCEDURE — 77063 BREAST TOMOSYNTHESIS BI: CPT

## 2022-10-16 ENCOUNTER — HOSPITAL ENCOUNTER (EMERGENCY)
Age: 62
Discharge: HOME | End: 2022-10-16
Attending: EMERGENCY MEDICINE
Payer: COMMERCIAL

## 2022-10-16 VITALS
SYSTOLIC BLOOD PRESSURE: 139 MMHG | TEMPERATURE: 97.9 F | BODY MASS INDEX: 27.64 KG/M2 | HEIGHT: 63 IN | WEIGHT: 156 LBS | DIASTOLIC BLOOD PRESSURE: 87 MMHG | HEART RATE: 100 BPM | RESPIRATION RATE: 16 BRPM | OXYGEN SATURATION: 98 %

## 2022-10-16 DIAGNOSIS — M79.604 RIGHT LEG PAIN: Primary | ICD-10-CM

## 2022-10-16 PROCEDURE — 99282 EMERGENCY DEPT VISIT SF MDM: CPT | Performed by: EMERGENCY MEDICINE

## 2022-10-16 ASSESSMENT — ENCOUNTER SYMPTOMS
NAUSEA: 0
VOMITING: 0
SHORTNESS OF BREATH: 0
COLOR CHANGE: 0
COUGH: 0

## 2022-10-16 ASSESSMENT — PAIN DESCRIPTION - LOCATION: LOCATION: LEG

## 2022-10-16 ASSESSMENT — PAIN DESCRIPTION - ORIENTATION: ORIENTATION: RIGHT;LOWER

## 2022-10-16 ASSESSMENT — PAIN SCALES - GENERAL: PAINLEVEL_OUTOF10: 5

## 2022-10-16 ASSESSMENT — PAIN - FUNCTIONAL ASSESSMENT: PAIN_FUNCTIONAL_ASSESSMENT: 0-10
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
## 2022-10-16 NOTE — ED NOTES
I have reviewed discharge instructions with the patient. The patient verbalized understanding. Patient left ED via Discharge Method: ambulatory to Home with self. Opportunity for questions and clarification provided. Patient given 0 scripts. To continue your aftercare when you leave the hospital, you may receive an automated call from our care team to check in on how you are doing. This is a free service and part of our promise to provide the best care and service to meet your aftercare needs.  If you have questions, or wish to unsubscribe from this service please call [phone]. Thank you for Choosing our [facility] Emergency Department.         Terell Reyna RN  10/16/22 8436

## 2022-10-16 NOTE — ED TRIAGE NOTES
Patient with inside right lower leg pain with \"lump\" present started yesterday and feels like \"lump\" is worse today. Patient denies any known trauma, no bruising noted.

## 2022-10-16 NOTE — DISCHARGE INSTRUCTIONS
Return to the emergency department for worsening swelling, increasing pain, redness, numbness or tingling. Recommend elevate 2-3 times a day for 20 minutes with applied heat.

## 2022-10-17 ENCOUNTER — NURSE ONLY (OUTPATIENT)
Dept: INTERNAL MEDICINE CLINIC | Facility: CLINIC | Age: 62
End: 2022-10-17
Payer: COMMERCIAL

## 2022-10-17 DIAGNOSIS — Z23 ENCOUNTER FOR IMMUNIZATION: Primary | ICD-10-CM

## 2022-10-17 PROCEDURE — 90674 CCIIV4 VAC NO PRSV 0.5 ML IM: CPT | Performed by: INTERNAL MEDICINE

## 2022-10-17 PROCEDURE — 90471 IMMUNIZATION ADMIN: CPT | Performed by: INTERNAL MEDICINE

## 2022-10-20 ENCOUNTER — OFFICE VISIT (OUTPATIENT)
Dept: INTERNAL MEDICINE CLINIC | Facility: CLINIC | Age: 62
End: 2022-10-20
Payer: COMMERCIAL

## 2022-10-20 ENCOUNTER — HOSPITAL ENCOUNTER (OUTPATIENT)
Dept: ULTRASOUND IMAGING | Age: 62
Discharge: HOME | End: 2022-10-22
Payer: COMMERCIAL

## 2022-10-20 VITALS
BODY MASS INDEX: 27.46 KG/M2 | HEIGHT: 63 IN | DIASTOLIC BLOOD PRESSURE: 82 MMHG | SYSTOLIC BLOOD PRESSURE: 122 MMHG | HEART RATE: 111 BPM | WEIGHT: 155 LBS | TEMPERATURE: 97.6 F | OXYGEN SATURATION: 99 %

## 2022-10-20 DIAGNOSIS — Z12.11 SCREEN FOR COLON CANCER: ICD-10-CM

## 2022-10-20 DIAGNOSIS — M79.661 RIGHT CALF PAIN: Primary | ICD-10-CM

## 2022-10-20 DIAGNOSIS — D70.9 NEUTROPENIA, UNSPECIFIED TYPE (HCC): ICD-10-CM

## 2022-10-20 DIAGNOSIS — M79.89 CALF SWELLING: ICD-10-CM

## 2022-10-20 DIAGNOSIS — M79.661 RIGHT CALF PAIN: ICD-10-CM

## 2022-10-20 PROBLEM — G89.18 POSTOPERATIVE PAIN: Status: ACTIVE | Noted: 2022-02-11

## 2022-10-20 PROBLEM — N81.10 VAGINAL PROLAPSE: Status: ACTIVE | Noted: 2021-11-11

## 2022-10-20 PROCEDURE — 93971 EXTREMITY STUDY: CPT

## 2022-10-20 PROCEDURE — 99213 OFFICE O/P EST LOW 20 MIN: CPT | Performed by: NURSE PRACTITIONER

## 2022-10-20 RX ORDER — PHENTERMINE HYDROCHLORIDE 37.5 MG/1
1 TABLET ORAL DAILY
COMMUNITY
Start: 2022-09-14

## 2022-10-20 RX ORDER — DOXYCYCLINE HYCLATE 100 MG/1
100 CAPSULE ORAL DAILY
COMMUNITY
Start: 2022-05-03

## 2022-10-20 NOTE — PROGRESS NOTES
Cornelius Wilkes (: 1960) presents today c/o persistent right lower leg pain/swelling for two weeks. She went to the ER over the weekend and physician felt like it was a hematoma. She has not had improvement in symptoms and feels like the swelling may be moving up her leg. She drove to [place] about 4 weeks ago; otherwise, no recent travel. No hx/o DVT. Chief Complaint   Patient presents with    Follow-up     ED f/u      Reviewed and updated this visit by provider:  Tobacco  Allergies  Meds  Problems  Med Hx  Surg Hx  Fam Hx       Immunizations:  Immunization status: up to date and documented. Review of Systems - Negative except as stated in HPI  History obtained from chart review and the patient  General ROS: negative for - fever  Respiratory ROS: no cough, shortness of breath, or wheezing  Cardiovascular ROS: no chest pain or dyspnea on exertion  Musculoskeletal ROS: positive for - pain in right inner lower leg  negative for - joint pain  Neurological ROS: no TIA or stroke symptoms  Dermatological ROS: negative for - skin lesion changes    Visit Vitals  /82 (Site: Left Upper Arm, Position: Sitting)   Pulse (!) 111   Temp 97.6 °F (36.4 °C) (Temporal)   Ht 5' 3\" (1.6 m)   Wt 155 lb (70.3 kg)   SpO2 99%   BMI 27.46 kg/m²     Physical Examination: General appearance - alert, well appearing, and in no distress, oriented to person, place, and time, and normal appearing weight  Mental status - alert, oriented to person, place, and time, normal mood, behavior, speech, dress, motor activity, and thought processes, affect appropriate to mood  Chest - clear to auscultation, no wheezes, rales or rhonchi, symmetric air entry  Heart - normal rate, regular rhythm, normal S1, S2, no murmurs, rubs, clicks or gallops  Musculoskeletal - abnormal exam of right medial mid leg - 3cm x 2cm area of induration/fullness - tenderness to palpation; mild redness. No warmth or skin breakdown.  Calf circumference 36cm right and 35cm left  Extremities - peripheral pulses normal, scant RLE non-pitting edema, no clubbing or cyanosis, no edema, redness or tenderness in the calves or thighs  Skin - normal coloration and turgor, no rashes, no suspicious skin lesions noted    Assessment/Plan:  1. Right calf pain    2. Calf swelling    3. Neutropenia, unspecified type ([address]    4. Screen for colon cancer      Orders Placed This Encounter   Procedures    Cologuard (Fecal DNA Colorectal Cancer Screening)    CBC with Auto Differential     Standing Status:   Future     Standing Expiration Date:   10/20/2023    Vascular duplex lower extremity venous right     Standing Status:   Future     Standing Expiration Date:   10/20/2023     CBC today; will contact with results. Stat venous doppler ultrasound today - will contact with results and determine plan of care. She is resistant to screening colonoscopy; Cologuard recommended and she is agreeable; order placed. Call or return to clinic prn if these symptoms worsen or fail to improve as anticipated.     Ai Bearden, GENA, APRN - CNP

## 2022-10-21 LAB
BASOPHILS # BLD: 0.1 K/UL (ref 0–0.2)
BASOPHILS NFR BLD: 1 % (ref 0–2)
DIFFERENTIAL METHOD BLD: ABNORMAL
EOSINOPHIL # BLD: 0.1 K/UL (ref 0–0.8)
EOSINOPHIL NFR BLD: 1 % (ref 0.5–7.8)
ERYTHROCYTE [DISTWIDTH] IN BLOOD BY AUTOMATED COUNT: 13.1 % (ref 11.9–14.6)
HCT VFR BLD AUTO: 45 % (ref 35.8–46.3)
HGB BLD-MCNC: 14.2 G/DL (ref 11.7–15.4)
IMM GRANULOCYTES # BLD AUTO: 0 K/UL (ref 0–0.5)
IMM GRANULOCYTES NFR BLD AUTO: 0 % (ref 0–5)
LYMPHOCYTES # BLD: 3.3 K/UL (ref 0.5–4.6)
LYMPHOCYTES NFR BLD: 37 % (ref 13–44)
MCH RBC QN AUTO: 30.5 PG (ref 26.1–32.9)
MCHC RBC AUTO-ENTMCNC: 31.6 G/DL (ref 31.4–35)
MCV RBC AUTO: 96.6 FL (ref 82–102)
MONOCYTES # BLD: 0.9 K/UL (ref 0.1–1.3)
MONOCYTES NFR BLD: 10 % (ref 4–12)
NEUTS SEG # BLD: 4.5 K/UL (ref 1.7–8.2)
NEUTS SEG NFR BLD: 51 % (ref 43–78)
NRBC # BLD: 0 K/UL (ref 0–0.2)
PLATELET # BLD AUTO: 413 K/UL (ref 150–450)
PMV BLD AUTO: 9 FL (ref 9.4–12.3)
RBC # BLD AUTO: 4.66 M/UL (ref 4.05–5.2)
WBC # BLD AUTO: 8.8 K/UL (ref 4.3–11.1)

## 2022-10-26 ENCOUNTER — HOSPITAL ENCOUNTER (OUTPATIENT)
Dept: GENERAL RADIOLOGY | Age: 62
Discharge: HOME | End: 2022-10-28
Payer: COMMERCIAL

## 2022-10-26 DIAGNOSIS — M79.604 RIGHT LEG PAIN: ICD-10-CM

## 2022-10-26 DIAGNOSIS — M79.89 CALF SWELLING: ICD-10-CM

## 2022-10-26 DIAGNOSIS — M79.661 RIGHT CALF PAIN: Primary | ICD-10-CM

## 2022-10-26 DIAGNOSIS — M79.661 RIGHT CALF PAIN: ICD-10-CM

## 2022-10-26 PROCEDURE — 73590 X-RAY EXAM OF LOWER LEG: CPT

## 2022-11-30 RX ORDER — DOXYCYCLINE HYCLATE 100 MG/1
100 CAPSULE ORAL DAILY
Qty: 90 CAPSULE | Refills: 1 | Status: SHIPPED | OUTPATIENT
Start: 2022-11-30

## 2023-01-11 ENCOUNTER — OFFICE VISIT (OUTPATIENT)
Dept: INTERNAL MEDICINE CLINIC | Facility: CLINIC | Age: 63
End: 2023-01-11
Payer: COMMERCIAL

## 2023-01-11 VITALS
DIASTOLIC BLOOD PRESSURE: 80 MMHG | BODY MASS INDEX: 28.35 KG/M2 | WEIGHT: 160 LBS | HEIGHT: 63 IN | SYSTOLIC BLOOD PRESSURE: 128 MMHG

## 2023-01-11 DIAGNOSIS — L71.9 ROSACEA: ICD-10-CM

## 2023-01-11 DIAGNOSIS — E89.0 POSTOPERATIVE HYPOTHYROIDISM: ICD-10-CM

## 2023-01-11 DIAGNOSIS — F51.01 PRIMARY INSOMNIA: ICD-10-CM

## 2023-01-11 DIAGNOSIS — R73.03 PREDIABETES: ICD-10-CM

## 2023-01-11 DIAGNOSIS — I10 ESSENTIAL HYPERTENSION: Primary | ICD-10-CM

## 2023-01-11 DIAGNOSIS — R73.9 ELEVATED BLOOD SUGAR: ICD-10-CM

## 2023-01-11 DIAGNOSIS — E78.00 HYPERCHOLESTEROLEMIA: ICD-10-CM

## 2023-01-11 DIAGNOSIS — Z71.82 EXERCISE COUNSELING: ICD-10-CM

## 2023-01-11 DIAGNOSIS — M24.559 HIP FLEXOR TIGHTNESS, UNSPECIFIED LATERALITY: ICD-10-CM

## 2023-01-11 PROBLEM — G89.18 POSTOPERATIVE PAIN: Status: RESOLVED | Noted: 2022-02-11 | Resolved: 2023-01-11

## 2023-01-11 PROBLEM — D70.9 NEUTROPENIA (HCC): Status: RESOLVED | Noted: 2022-02-11 | Resolved: 2023-01-11

## 2023-01-11 PROCEDURE — 99214 OFFICE O/P EST MOD 30 MIN: CPT | Performed by: INTERNAL MEDICINE

## 2023-01-11 PROCEDURE — 3074F SYST BP LT 130 MM HG: CPT | Performed by: INTERNAL MEDICINE

## 2023-01-11 PROCEDURE — 3079F DIAST BP 80-89 MM HG: CPT | Performed by: INTERNAL MEDICINE

## 2023-01-11 RX ORDER — CLONAZEPAM 1 MG/1
TABLET ORAL
Qty: 30 TABLET | Refills: 5 | Status: SHIPPED | OUTPATIENT
Start: 2023-01-11 | End: 2023-07-11

## 2023-01-11 ASSESSMENT — PATIENT HEALTH QUESTIONNAIRE - PHQ9
1. LITTLE INTEREST OR PLEASURE IN DOING THINGS: 0
SUM OF ALL RESPONSES TO PHQ QUESTIONS 1-9: 0
SUM OF ALL RESPONSES TO PHQ9 QUESTIONS 1 & 2: 0
SUM OF ALL RESPONSES TO PHQ QUESTIONS 1-9: 0
2. FEELING DOWN, DEPRESSED OR HOPELESS: 0

## 2023-01-11 ASSESSMENT — ENCOUNTER SYMPTOMS: SHORTNESS OF BREATH: 1

## 2023-01-11 NOTE — PROGRESS NOTES
HPI: Jarrell Degroot (: 1960)    Need to refill meds and update labs    Lost her mother after Miguel - was in Hospice care     Has been still having issues with pain in her hips but trying to walk   Using knee support pillow at night      Problem List:  Patient Active Problem List   Diagnosis    Goiter    Muscle ache of extremity    Prediabetes    Multinodular goiter    Hypercholesterolemia    Postoperative hypothyroidism    Insomnia    Dense breasts    Perimenopausal    Vaginal prolapse    Rosacea    Essential hypertension    Family history of breast cancer in first degree relative    Hip flexor tightness       History:  Past Medical History:   Diagnosis Date    Abnormal finding on EKG     RBBB    Chicken pox     COVID-19 vaccine series completed     Pfizer vaccine completed X3 doses    Depression     Former cigarette smoker     Headache     migraine    Insomnia, transient 12    Measles     Multinodular goiter 2020    Dr. Gwen Shaffer    Mumps     Routine eye exam     Dr. 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  30 tablet 5    doxycycline hyclate (VIBRAMYCIN) 100 MG capsule Take 1 capsule by mouth daily 90 capsule 1    VITAMIN C, CALCIUM ASCORBATE, PO Take 1 tablet by mouth as needed      ZINC PO Take by mouth as needed      Biotin 2.5 MG CAPS Take 1 tablet by mouth as needed      Cholecalciferol 50 MCG ( UT) TABS Take by mouth as needed      cyanocobalamin 1000 MCG tablet Take 1,000 mcg by mouth as needed      eletriptan (RELPAX) 20 MG tablet TAKE ONE TABLET BY MOUTH ONCE AS NEEDED FOR MIGRAINE FOR UP TO ONE DOSE. MAY REPEAT IN 2 HOURS IF NECESSARY      metroNIDAZOLE (METROGEL) 1 % gel Apply topically daily      olmesartan (BENICAR) 20 MG tablet Take 20 mg by mouth daily       No current facility-administered medications for this visit. Review of Systems:  Review of Systems   Constitutional:  Negative for unexpected weight change. Respiratory:  Positive for shortness of breath. Cardiovascular:  Negative for chest pain. Musculoskeletal:  Positive for arthralgias and gait problem. Psychiatric/Behavioral:  Positive for sleep disturbance. All other systems reviewed and are negative. Vitals:  /80   Ht 5' 3\" (1.6 m)   Wt 160 lb (72.6 kg)   BMI 28.34 kg/m²     Physical Exam:  Physical Exam  Vitals reviewed. Constitutional:       Appearance: Normal appearance. HENT:      Head: Normocephalic and atraumatic. Cardiovascular:      Rate and Rhythm: Normal rate and regular rhythm. Heart sounds: Normal heart sounds. Pulmonary:      Effort: Pulmonary effort is normal.      Breath sounds: Normal breath sounds. Musculoskeletal:      Cervical back: Normal range of motion and neck supple. Comments: Decrease ROM of both hips   Decrease flexibility   Skin:     General: Skin is warm and dry. Neurological:      General: No focal deficit present. Mental Status: She is alert and oriented to person, place, and time. Psychiatric:         Mood and Affect: Mood normal.         Behavior: Behavior normal.         Thought Content: Thought content normal.         Judgment: Judgment normal.        Assessment/Plan:   Katheryn Santos was seen today for follow-up. Diagnoses and all orders for this visit:    Essential hypertension  -     CBC with Auto Differential; Future  -     Comprehensive Metabolic Panel; Future  controlled  Prediabetes  Watch sweets in diet  Hypercholesterolemia  -     Lipid Panel; Future    Postoperative hypothyroidism  -     TSH; Future    Primary insomnia  -     clonazePAM (KLONOPIN) 1 MG tablet; Take one tablet every evening.     Exercise counseling  Walking more and standing at her desk due to sedentary job  Elevated blood sugar  -     Hemoglobin A1C; Future    Hip flexor tightness, unspecified laterality    Discussed exercises and stretching    Rosacea- continue meds      Current medications are therapeutic at this time; continue as prescribed.         Shun Seth MD

## 2023-01-23 ENCOUNTER — OFFICE VISIT (OUTPATIENT)
Dept: INTERNAL MEDICINE CLINIC | Facility: CLINIC | Age: 63
End: 2023-01-23
Payer: COMMERCIAL

## 2023-01-23 VITALS — WEIGHT: 160 LBS | BODY MASS INDEX: 28.34 KG/M2

## 2023-01-23 DIAGNOSIS — R05.1 ACUTE COUGH: ICD-10-CM

## 2023-01-23 DIAGNOSIS — J06.9 ACUTE URI: Primary | ICD-10-CM

## 2023-01-23 PROCEDURE — 96372 THER/PROPH/DIAG INJ SC/IM: CPT | Performed by: NURSE PRACTITIONER

## 2023-01-23 PROCEDURE — 99213 OFFICE O/P EST LOW 20 MIN: CPT | Performed by: NURSE PRACTITIONER

## 2023-01-23 RX ORDER — METHYLPREDNISOLONE SODIUM SUCCINATE 40 MG/ML
40 INJECTION, POWDER, LYOPHILIZED, FOR SOLUTION INTRAMUSCULAR; INTRAVENOUS ONCE
Status: COMPLETED | OUTPATIENT
Start: 2023-01-23 | End: 2023-01-23

## 2023-01-23 RX ORDER — AZITHROMYCIN 250 MG/1
250 TABLET, FILM COATED ORAL SEE ADMIN INSTRUCTIONS
Qty: 6 TABLET | Refills: 0 | Status: SHIPPED | OUTPATIENT
Start: 2023-01-23 | End: 2023-01-28

## 2023-01-23 RX ADMIN — METHYLPREDNISOLONE SODIUM SUCCINATE 40 MG: 40 INJECTION, POWDER, LYOPHILIZED, FOR SOLUTION INTRAMUSCULAR; INTRAVENOUS at 11:55

## 2023-01-23 NOTE — PROGRESS NOTES
Gloria Junior (: 1960) presents today c/o sinus and ear congestion, cough x 3 days ago. She has been taking OTC DayQuil/NyQuil with mild improvement. Chief Complaint   Patient presents with    Congestion    Cough       Patient Active Problem List   Diagnosis    Goiter    Muscle ache of extremity    Prediabetes    Multinodular goiter    Hypercholesterolemia    Postoperative hypothyroidism    Insomnia    Dense breasts    Perimenopausal    Vaginal prolapse    Rosacea    Essential hypertension    Family history of breast cancer in first degree relative    Hip flexor tightness      Reviewed and updated this visit by provider:  Tobacco  Allergies  Meds  Problems  Med Hx  Surg Hx  Fam Hx       Immunizations:  Immunization status: up to date and documented. Review of Systems - Negative except as stated in HPI  History obtained from chart review and the patient  General ROS: negative for - fever  ENT ROS: positive for - nasal congestion and sore throat  Respiratory ROS: positive for - cough  negative for - shortness of breath or sputum changes    Visit Vitals  Wt 160 lb (72.6 kg)   BMI 28.34 kg/m²     Physical Examination: General appearance - alert, well appearing, and in no distress, oriented to person, place, and time, and normal appearing weight  Mental status - alert, oriented to person, place, and time, normal mood, behavior, speech, dress, motor activity, and thought processes, affect appropriate to mood  Ears - TM's dull/intact bilaterally  Nose - mucosal congestion  Mouth - erythematous  Neck - supple, no significant adenopathy, thyroid exam: thyroid is normal in size without nodules or tenderness  Chest - clear to auscultation, no wheezes, rales or rhonchi, symmetric air entry  Heart - normal rate, regular rhythm, normal S1, S2, no murmurs, rubs, clicks or gallops    Assessment/Plan:  1. Acute URI    2.  Acute cough      Orders Placed This Encounter   Medications    azithromycin (ZITHROMAX) 250 MG tablet     Sig: Take 1 tablet by mouth See Admin Instructions for 5 days 500mg on day 1 followed by 250mg on days 2 - 5     Dispense:  6 tablet     Refill:  0    methylPREDNISolone sodium (SOLU-MEDROL) injection 40 mg     Solumedrol in office. Sergio Cordial as prescribed - directions for use and side effects discussed. Recommended OTC Zyrtec 10mg daily x 14 days  Add Mucinex BID. Rest; push fluids. Call or return to clinic prn if these symptoms worsen or fail to improve as anticipated.     Valerie Méndez, GENA, APRN - CNP

## 2023-01-24 DIAGNOSIS — R05.1 ACUTE COUGH: Primary | ICD-10-CM

## 2023-01-24 RX ORDER — BROMPHENIRAMINE MALEATE, PSEUDOEPHEDRINE HYDROCHLORIDE, AND DEXTROMETHORPHAN HYDROBROMIDE 2; 30; 10 MG/5ML; MG/5ML; MG/5ML
5 SYRUP ORAL 4 TIMES DAILY PRN
Qty: 120 ML | Refills: 0 | Status: SHIPPED | OUTPATIENT
Start: 2023-01-24

## 2023-02-27 DIAGNOSIS — E78.00 HYPERCHOLESTEROLEMIA: ICD-10-CM

## 2023-02-27 DIAGNOSIS — R73.9 ELEVATED BLOOD SUGAR: ICD-10-CM

## 2023-02-27 DIAGNOSIS — I10 ESSENTIAL HYPERTENSION: ICD-10-CM

## 2023-02-27 DIAGNOSIS — E89.0 POSTOPERATIVE HYPOTHYROIDISM: ICD-10-CM

## 2023-02-27 LAB
ALBUMIN SERPL-MCNC: 3.5 G/DL (ref 3.2–4.6)
ALBUMIN/GLOB SERPL: 1.2 (ref 0.4–1.6)
ALP SERPL-CCNC: 81 U/L (ref 50–136)
ALT SERPL-CCNC: 22 U/L (ref 12–65)
ANION GAP SERPL CALC-SCNC: 4 MMOL/L (ref 2–11)
AST SERPL-CCNC: 16 U/L (ref 15–37)
BASOPHILS # BLD: 0.1 K/UL (ref 0–0.2)
BASOPHILS NFR BLD: 1 % (ref 0–2)
BILIRUB SERPL-MCNC: 0.4 MG/DL (ref 0.2–1.1)
BUN SERPL-MCNC: 17 MG/DL (ref 8–23)
CALCIUM SERPL-MCNC: 9.1 MG/DL (ref 8.3–10.4)
CHLORIDE SERPL-SCNC: 111 MMOL/L (ref 101–110)
CHOLEST SERPL-MCNC: 216 MG/DL
CO2 SERPL-SCNC: 24 MMOL/L (ref 21–32)
CREAT SERPL-MCNC: 0.8 MG/DL (ref 0.6–1)
DIFFERENTIAL METHOD BLD: ABNORMAL
EOSINOPHIL # BLD: 0.1 K/UL (ref 0–0.8)
EOSINOPHIL NFR BLD: 1 % (ref 0.5–7.8)
ERYTHROCYTE [DISTWIDTH] IN BLOOD BY AUTOMATED COUNT: 13.2 % (ref 11.9–14.6)
EST. AVERAGE GLUCOSE BLD GHB EST-MCNC: 117 MG/DL
GLOBULIN SER CALC-MCNC: 3 G/DL (ref 2.8–4.5)
GLUCOSE SERPL-MCNC: 91 MG/DL (ref 65–100)
HBA1C MFR BLD: 5.7 % (ref 4.8–5.6)
HCT VFR BLD AUTO: 46 % (ref 35.8–46.3)
HDLC SERPL-MCNC: 48 MG/DL (ref 40–60)
HDLC SERPL: 4.5
HGB BLD-MCNC: 14.3 G/DL (ref 11.7–15.4)
IMM GRANULOCYTES # BLD AUTO: 0.1 K/UL (ref 0–0.5)
IMM GRANULOCYTES NFR BLD AUTO: 1 % (ref 0–5)
LDLC SERPL CALC-MCNC: 139 MG/DL
LYMPHOCYTES # BLD: 2.5 K/UL (ref 0.5–4.6)
LYMPHOCYTES NFR BLD: 35 % (ref 13–44)
MCH RBC QN AUTO: 29.9 PG (ref 26.1–32.9)
MCHC RBC AUTO-ENTMCNC: 31.1 G/DL (ref 31.4–35)
MCV RBC AUTO: 96 FL (ref 82–102)
MONOCYTES # BLD: 0.8 K/UL (ref 0.1–1.3)
MONOCYTES NFR BLD: 11 % (ref 4–12)
NEUTS SEG # BLD: 3.7 K/UL (ref 1.7–8.2)
NEUTS SEG NFR BLD: 52 % (ref 43–78)
NRBC # BLD: 0 K/UL (ref 0–0.2)
PLATELET # BLD AUTO: 360 K/UL (ref 150–450)
PMV BLD AUTO: 9.4 FL (ref 9.4–12.3)
POTASSIUM SERPL-SCNC: 4 MMOL/L (ref 3.5–5.1)
PROT SERPL-MCNC: 6.5 G/DL (ref 6.3–8.2)
RBC # BLD AUTO: 4.79 M/UL (ref 4.05–5.2)
SODIUM SERPL-SCNC: 139 MMOL/L (ref 133–143)
TRIGL SERPL-MCNC: 145 MG/DL (ref 35–150)
TSH, 3RD GENERATION: 2.07 UIU/ML (ref 0.36–3.74)
VLDLC SERPL CALC-MCNC: 29 MG/DL (ref 6–23)
WBC # BLD AUTO: 7.2 K/UL (ref 4.3–11.1)

## 2023-03-27 RX ORDER — ELETRIPTAN HYDROBROMIDE 20 MG/1
20 TABLET, FILM COATED ORAL
Qty: 18 TABLET | Refills: 2 | Status: SHIPPED | OUTPATIENT
Start: 2023-03-27 | End: 2023-03-30 | Stop reason: SDUPTHER

## 2023-03-30 RX ORDER — ELETRIPTAN HYDROBROMIDE 20 MG/1
20 TABLET, FILM COATED ORAL
Qty: 18 TABLET | Refills: 2 | Status: SHIPPED | OUTPATIENT
Start: 2023-03-30 | End: 2023-03-30

## 2023-04-21 ENCOUNTER — OFFICE VISIT (OUTPATIENT)
Dept: INTERNAL MEDICINE CLINIC | Facility: CLINIC | Age: 63
End: 2023-04-21

## 2023-04-21 VITALS — WEIGHT: 163 LBS | BODY MASS INDEX: 28.88 KG/M2 | HEIGHT: 63 IN

## 2023-04-21 DIAGNOSIS — M79.604 RIGHT LEG PAIN: ICD-10-CM

## 2023-04-21 DIAGNOSIS — R35.0 URINARY FREQUENCY: ICD-10-CM

## 2023-04-21 DIAGNOSIS — I10 ESSENTIAL HYPERTENSION: ICD-10-CM

## 2023-04-21 DIAGNOSIS — R26.9 GAIT DISORDER: ICD-10-CM

## 2023-04-21 DIAGNOSIS — R30.0 DYSURIA: Primary | ICD-10-CM

## 2023-04-21 DIAGNOSIS — M24.559 HIP FLEXOR TIGHTNESS, UNSPECIFIED LATERALITY: ICD-10-CM

## 2023-04-21 DIAGNOSIS — R30.0 DYSURIA: ICD-10-CM

## 2023-04-21 LAB
BILIRUBIN, URINE, POC: NEGATIVE
BLOOD URINE, POC: NORMAL
GLUCOSE URINE, POC: NEGATIVE
KETONES, URINE, POC: NEGATIVE
LEUKOCYTE ESTERASE, URINE, POC: NORMAL
NITRITE, URINE, POC: NEGATIVE
PH, URINE, POC: 5 (ref 4.6–8)
PROTEIN,URINE, POC: NEGATIVE
SPECIFIC GRAVITY, URINE, POC: 1.01 (ref 1–1.03)
URINALYSIS CLARITY, POC: CLEAR
URINALYSIS COLOR, POC: YELLOW
UROBILINOGEN, POC: NORMAL

## 2023-04-21 RX ORDER — CIPROFLOXACIN 500 MG/1
500 TABLET, FILM COATED ORAL 2 TIMES DAILY
Qty: 6 TABLET | Refills: 0 | Status: SHIPPED | OUTPATIENT
Start: 2023-04-21 | End: 2023-04-24

## 2023-04-21 RX ORDER — PREDNISONE 10 MG/1
TABLET ORAL
Qty: 10 TABLET | Refills: 0 | Status: SHIPPED | OUTPATIENT
Start: 2023-04-21

## 2023-04-21 ASSESSMENT — PATIENT HEALTH QUESTIONNAIRE - PHQ9
SUM OF ALL RESPONSES TO PHQ QUESTIONS 1-9: 0
SUM OF ALL RESPONSES TO PHQ QUESTIONS 1-9: 0
1. LITTLE INTEREST OR PLEASURE IN DOING THINGS: 0
SUM OF ALL RESPONSES TO PHQ9 QUESTIONS 1 & 2: 0
SUM OF ALL RESPONSES TO PHQ QUESTIONS 1-9: 0
SUM OF ALL RESPONSES TO PHQ QUESTIONS 1-9: 0
2. FEELING DOWN, DEPRESSED OR HOPELESS: 0

## 2023-04-21 NOTE — PROGRESS NOTES
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   Pain with walking  Has hip flexor and gait issues chronically and has to sit for prolonged periods at work  Has not been exercising regularly and has gained wt      Problem List:  Patient Active Problem List   Diagnosis    Goiter    Muscle ache of extremity    Prediabetes    Multinodular goiter    Hypercholesterolemia    Postoperative hypothyroidism    Insomnia    Dense breasts    Perimenopausal    Vaginal prolapse    Rosacea    Essential hypertension    Family history of breast cancer in first degree relative    Hip flexor tightness       History:  Past Medical History:   Diagnosis Date    Abnormal finding on EKG     RBBB    Chicken pox     COVID-19 vaccine series completed     Pfizer vaccine completed X3 doses    Depression     Former cigarette smoker     Headache     migraine    Insomnia, transient 12    Measles     Multinodular goiter 2020    Dr. Jaida Nielsen    Mumps     Routine eye exam     Dr. 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 MAY REPEAT IN 2 HOURS IF NECESSARY 18 tablet 2    brompheniramine-pseudoephedrine-DM (BROMFED DM) 2-30-10 MG/5ML syrup Take 5 mLs by mouth 4 times daily as needed for Congestion or Cough 120

## 2023-04-24 LAB
BACTERIA SPEC CULT: NORMAL
SERVICE CMNT-IMP: NORMAL

## 2023-05-30 RX ORDER — OLMESARTAN MEDOXOMIL 20 MG/1
TABLET ORAL
Qty: 90 TABLET | Refills: 3 | Status: SHIPPED | OUTPATIENT
Start: 2023-05-30

## 2023-06-02 RX ORDER — DOXYCYCLINE HYCLATE 100 MG/1
CAPSULE ORAL
Qty: 90 CAPSULE | Refills: 3 | Status: SHIPPED | OUTPATIENT
Start: 2023-06-02

## 2023-06-06 ENCOUNTER — OFFICE VISIT (OUTPATIENT)
Dept: INTERNAL MEDICINE CLINIC | Facility: CLINIC | Age: 63
End: 2023-06-06
Payer: COMMERCIAL

## 2023-06-06 VITALS
HEART RATE: 85 BPM | OXYGEN SATURATION: 98 % | HEIGHT: 63 IN | BODY MASS INDEX: 28.35 KG/M2 | WEIGHT: 160 LBS | TEMPERATURE: 97 F | SYSTOLIC BLOOD PRESSURE: 138 MMHG | DIASTOLIC BLOOD PRESSURE: 88 MMHG

## 2023-06-06 DIAGNOSIS — M76.892 TENDINITIS OF LEFT HIP FLEXOR: Primary | ICD-10-CM

## 2023-06-06 DIAGNOSIS — F51.01 PRIMARY INSOMNIA: ICD-10-CM

## 2023-06-06 DIAGNOSIS — M76.891 TENDINITIS OF RIGHT HIP FLEXOR: ICD-10-CM

## 2023-06-06 DIAGNOSIS — R21 RASH: ICD-10-CM

## 2023-06-06 DIAGNOSIS — G89.29 CHRONIC BILATERAL LOW BACK PAIN WITHOUT SCIATICA: Chronic | ICD-10-CM

## 2023-06-06 DIAGNOSIS — M54.50 CHRONIC BILATERAL LOW BACK PAIN WITHOUT SCIATICA: Chronic | ICD-10-CM

## 2023-06-06 PROCEDURE — 99214 OFFICE O/P EST MOD 30 MIN: CPT | Performed by: NURSE PRACTITIONER

## 2023-06-06 PROCEDURE — 3079F DIAST BP 80-89 MM HG: CPT | Performed by: NURSE PRACTITIONER

## 2023-06-06 PROCEDURE — 3075F SYST BP GE 130 - 139MM HG: CPT | Performed by: NURSE PRACTITIONER

## 2023-06-06 RX ORDER — CLONAZEPAM 1 MG/1
TABLET ORAL
Qty: 30 TABLET | Refills: 5 | Status: SHIPPED | OUTPATIENT
Start: 2023-06-06 | End: 2023-12-04

## 2023-06-06 RX ORDER — TIZANIDINE 4 MG/1
2-4 TABLET ORAL NIGHTLY PRN
Qty: 10 TABLET | Refills: 0 | Status: SHIPPED | OUTPATIENT
Start: 2023-06-06

## 2023-06-06 RX ORDER — PREDNISONE 20 MG/1
TABLET ORAL
Qty: 13 TABLET | Refills: 0 | Status: SHIPPED | OUTPATIENT
Start: 2023-06-06

## 2023-06-06 SDOH — ECONOMIC STABILITY: HOUSING INSECURITY
IN THE LAST 12 MONTHS, WAS THERE A TIME WHEN YOU DID NOT HAVE A STEADY PLACE TO SLEEP OR SLEPT IN A SHELTER (INCLUDING NOW)?: NO

## 2023-06-06 SDOH — ECONOMIC STABILITY: FOOD INSECURITY: WITHIN THE PAST 12 MONTHS, THE FOOD YOU BOUGHT JUST DIDN'T LAST AND YOU DIDN'T HAVE MONEY TO GET MORE.: NEVER TRUE

## 2023-06-06 SDOH — ECONOMIC STABILITY: INCOME INSECURITY: HOW HARD IS IT FOR YOU TO PAY FOR THE VERY BASICS LIKE FOOD, HOUSING, MEDICAL CARE, AND HEATING?: NOT HARD AT ALL

## 2023-06-06 SDOH — ECONOMIC STABILITY: FOOD INSECURITY: WITHIN THE PAST 12 MONTHS, YOU WORRIED THAT YOUR FOOD WOULD RUN OUT BEFORE YOU GOT MONEY TO BUY MORE.: NEVER TRUE

## 2023-06-06 ASSESSMENT — PATIENT HEALTH QUESTIONNAIRE - PHQ9
SUM OF ALL RESPONSES TO PHQ QUESTIONS 1-9: 0
SUM OF ALL RESPONSES TO PHQ QUESTIONS 1-9: 0
2. FEELING DOWN, DEPRESSED OR HOPELESS: 0
SUM OF ALL RESPONSES TO PHQ QUESTIONS 1-9: 0
SUM OF ALL RESPONSES TO PHQ QUESTIONS 1-9: 0
1. LITTLE INTEREST OR PLEASURE IN DOING THINGS: 0
SUM OF ALL RESPONSES TO PHQ9 QUESTIONS 1 & 2: 0

## 2023-06-06 ASSESSMENT — ENCOUNTER SYMPTOMS
COUGH: 0
SHORTNESS OF BREATH: 0
BACK PAIN: 1

## 2023-06-06 NOTE — TELEPHONE ENCOUNTER
Requested Prescriptions     Pending Prescriptions Disp Refills    clonazePAM (KLONOPIN) 1 MG tablet 30 tablet 5     Sig: Take one tablet every evening.

## 2023-06-06 NOTE — PROGRESS NOTES
June Lagos (: 1960) presents today c/o    No chief complaint on file. Patient Active Problem List   Diagnosis    Goiter    Muscle ache of extremity    Prediabetes    Multinodular goiter    Hypercholesterolemia    Postoperative hypothyroidism    Insomnia    Dense breasts    Perimenopausal    Vaginal prolapse    Rosacea    Essential hypertension    Family history of breast cancer in first degree relative    Hip flexor tightness        Reviewed and updated this visit by provider:         Immunizations:  Immunization status: {immuniz status:855370::\"up to date and documented\"}. Review of Systems - {ros master:033863}    There were no vitals taken for this visit. Physical Examination: {female adult master:208214}    Assessment/Plan:  No diagnosis found. No orders of the defined types were placed in this encounter. No orders of the defined types were placed in this encounter. No follow-ups on file.     {Time Documentation:[id number]}    Humberto Martínez, APRN - CNP
effort is normal.      Breath sounds: Normal breath sounds. Musculoskeletal:         General: Tenderness present. Cervical back: Normal range of motion and neck supple. Comments: Lower back pain with right lateral hip tenderness. Skin:     General: Skin is warm and dry. Capillary Refill: Capillary refill takes less than 2 seconds. Findings: Rash present. Comments: Red macular rash to bilateral lower legs   Neurological:      General: No focal deficit present. Mental Status: She is alert and oriented to person, place, and time. Psychiatric:         Mood and Affect: Mood normal.         Behavior: Behavior normal.         Thought Content: Thought content normal.         Judgment: Judgment normal.      Assessment/Plan:  1. Tendinitis of left hip flexor    2. Tendinitis of right hip flexor    3. Chronic bilateral low back pain without sciatica    4. Rash      Orders Placed This Encounter   Medications    predniSONE (DELTASONE) 20 MG tablet     Sig: 3 tabs daily x 2 days; 2 tabs daily x 2 days; 1 tab daily x 2 days; 1/2 tab daily x 2 days; then d/c     Dispense:  13 tablet     Refill:  0    tiZANidine (ZANAFLEX) 4 MG tablet     Sig: Take 0.5-1 tablets by mouth nightly as needed (muscle spasm)     Dispense:  10 tablet     Refill:  0     Prednisone and tizanidine as prescribed  - directions for use and side effects discussed. Cautioned for sedation with the latter. She is going to [facility] program; recommended therapy for hip flexor tendinitis. Rash appears to be [place]'s; watch for now. Call or return to clinic prn if these symptoms worsen or fail to improve as anticipated.       Arno Koyanagi, KACI - CNP

## 2023-07-06 ENCOUNTER — HOSPITAL ENCOUNTER (OUTPATIENT)
Dept: GENERAL RADIOLOGY | Age: 63
Discharge: HOME | End: 2023-07-08
Payer: COMMERCIAL

## 2023-07-06 ENCOUNTER — OFFICE VISIT (OUTPATIENT)
Dept: INTERNAL MEDICINE CLINIC | Facility: CLINIC | Age: 63
End: 2023-07-06
Payer: COMMERCIAL

## 2023-07-06 VITALS — HEIGHT: 63 IN | WEIGHT: 163 LBS | BODY MASS INDEX: 28.88 KG/M2

## 2023-07-06 DIAGNOSIS — M79.672 LEFT FOOT PAIN: ICD-10-CM

## 2023-07-06 DIAGNOSIS — M79.672 LEFT FOOT PAIN: Primary | ICD-10-CM

## 2023-07-06 PROCEDURE — 73630 X-RAY EXAM OF FOOT: CPT

## 2023-07-06 PROCEDURE — 99213 OFFICE O/P EST LOW 20 MIN: CPT | Performed by: INTERNAL MEDICINE

## 2023-07-06 ASSESSMENT — PATIENT HEALTH QUESTIONNAIRE - PHQ9
SUM OF ALL RESPONSES TO PHQ QUESTIONS 1-9: 0
2. FEELING DOWN, DEPRESSED OR HOPELESS: 0
SUM OF ALL RESPONSES TO PHQ QUESTIONS 1-9: 0
SUM OF ALL RESPONSES TO PHQ QUESTIONS 1-9: 0
1. LITTLE INTEREST OR PLEASURE IN DOING THINGS: 0
SUM OF ALL RESPONSES TO PHQ9 QUESTIONS 1 & 2: 0
SUM OF ALL RESPONSES TO PHQ QUESTIONS 1-9: 0

## 2023-09-15 DIAGNOSIS — R26.9 GAIT DISORDER: ICD-10-CM

## 2023-09-15 DIAGNOSIS — M24.559 HIP FLEXOR TIGHTNESS, UNSPECIFIED LATERALITY: ICD-10-CM

## 2023-09-15 DIAGNOSIS — M76.892 TENDINITIS OF LEFT HIP FLEXOR: ICD-10-CM

## 2023-09-15 DIAGNOSIS — M79.672 LEFT FOOT PAIN: Primary | ICD-10-CM

## 2023-09-23 ENCOUNTER — HOSPITAL ENCOUNTER (OUTPATIENT)
Dept: MAMMOGRAPHY | Age: 63
End: 2023-09-23
Attending: INTERNAL MEDICINE
Payer: COMMERCIAL

## 2023-09-23 DIAGNOSIS — Z12.31 VISIT FOR SCREENING MAMMOGRAM: ICD-10-CM

## 2023-09-23 PROCEDURE — 77063 BREAST TOMOSYNTHESIS BI: CPT

## 2023-10-02 ENCOUNTER — OFFICE VISIT (OUTPATIENT)
Dept: INTERNAL MEDICINE CLINIC | Facility: CLINIC | Age: 63
End: 2023-10-02
Payer: COMMERCIAL

## 2023-10-02 VITALS
HEIGHT: 63 IN | HEART RATE: 68 BPM | DIASTOLIC BLOOD PRESSURE: 88 MMHG | SYSTOLIC BLOOD PRESSURE: 130 MMHG | WEIGHT: 165 LBS | OXYGEN SATURATION: 97 % | BODY MASS INDEX: 29.23 KG/M2 | TEMPERATURE: 96.7 F

## 2023-10-02 DIAGNOSIS — R05.1 ACUTE COUGH: Primary | ICD-10-CM

## 2023-10-02 DIAGNOSIS — J01.90 ACUTE RHINOSINUSITIS: ICD-10-CM

## 2023-10-02 DIAGNOSIS — J02.9 ACUTE PHARYNGITIS, UNSPECIFIED ETIOLOGY: ICD-10-CM

## 2023-10-02 LAB
EXP DATE SOLUTION: NORMAL
EXP DATE SWAB: NORMAL
EXPIRATION DATE: NORMAL
INFLUENZA A ANTIGEN, POC: NEGATIVE
INFLUENZA B ANTIGEN, POC: NEGATIVE
LOT NUMBER POC: NORMAL
LOT NUMBER SOLUTION: NORMAL
LOT NUMBER SWAB: NORMAL
SARS-COV-2 RNA, POC: NEGATIVE
VALID INTERNAL CONTROL, POC: YES

## 2023-10-02 PROCEDURE — 87635 SARS-COV-2 COVID-19 AMP PRB: CPT | Performed by: NURSE PRACTITIONER

## 2023-10-02 PROCEDURE — 87804 INFLUENZA ASSAY W/OPTIC: CPT | Performed by: NURSE PRACTITIONER

## 2023-10-02 PROCEDURE — 3075F SYST BP GE 130 - 139MM HG: CPT | Performed by: NURSE PRACTITIONER

## 2023-10-02 PROCEDURE — 99213 OFFICE O/P EST LOW 20 MIN: CPT | Performed by: NURSE PRACTITIONER

## 2023-10-02 PROCEDURE — 3079F DIAST BP 80-89 MM HG: CPT | Performed by: NURSE PRACTITIONER

## 2023-10-02 RX ORDER — DEXTROMETHORPHAN HYDROBROMIDE AND PROMETHAZINE HYDROCHLORIDE 15; 6.25 MG/5ML; MG/5ML
5 SYRUP ORAL 4 TIMES DAILY PRN
Qty: 120 ML | Refills: 0 | Status: SHIPPED | OUTPATIENT
Start: 2023-10-02 | End: 2023-10-09

## 2023-10-02 RX ORDER — AZITHROMYCIN 250 MG/1
250 TABLET, FILM COATED ORAL SEE ADMIN INSTRUCTIONS
Qty: 6 TABLET | Refills: 0 | Status: SHIPPED | OUTPATIENT
Start: 2023-10-02 | End: 2023-10-07

## 2023-10-02 RX ORDER — PREDNISONE 20 MG/1
TABLET ORAL
Qty: 13 TABLET | Refills: 0 | Status: SHIPPED | OUTPATIENT
Start: 2023-10-02 | End: 2023-10-10

## 2023-10-02 ASSESSMENT — PATIENT HEALTH QUESTIONNAIRE - PHQ9
SUM OF ALL RESPONSES TO PHQ QUESTIONS 1-9: 0
SUM OF ALL RESPONSES TO PHQ QUESTIONS 1-9: 0
2. FEELING DOWN, DEPRESSED OR HOPELESS: 0
1. LITTLE INTEREST OR PLEASURE IN DOING THINGS: 0
SUM OF ALL RESPONSES TO PHQ QUESTIONS 1-9: 0
SUM OF ALL RESPONSES TO PHQ QUESTIONS 1-9: 0
SUM OF ALL RESPONSES TO PHQ9 QUESTIONS 1 & 2: 0

## 2023-10-02 NOTE — PROGRESS NOTES
without nodules or tenderness  Chest - clear to auscultation, no wheezes, rales or rhonchi, symmetric air entry  Heart - normal rate, regular rhythm, normal S1, S2, no murmurs, rubs, clicks or gallops    Results for orders placed or performed in visit on 10/02/23   AMB POC COVID-19 COV   Result Value Ref Range    SARS-COV-2 RNA, POC Negative     Lot number swab      EXP date swab      Lot number solution      EXP date solution      LOT NUMBER POC      EXPIRATION DATE     AMB POC RAPID INFLUENZA TEST   Result Value Ref Range    Valid Internal Control, POC Yes     Influenza A Antigen, POC Negative Negative    Influenza B Antigen, POC Negative Negative     Assessment/Plan:  1. Acute cough    2. Acute rhinosinusitis    3. Acute pharyngitis, unspecified etiology      Orders Placed This Encounter   Procedures    AMB POC COVID-19 COV     Order Specific Question:   Pregnant? Answer:   No    AMB POC RAPID INFLUENZA TEST     Orders Placed This Encounter   Medications    azithromycin (ZITHROMAX) 250 MG tablet     Sig: Take 1 tablet by mouth See Admin Instructions for 5 days 500mg on day 1 followed by 250mg on days 2 - 5     Dispense:  6 tablet     Refill:  0    promethazine-dextromethorphan (PROMETHAZINE-DM) 6.25-15 MG/5ML syrup     Sig: Take 5 mLs by mouth 4 times daily as needed for Cough     Dispense:  120 mL     Refill:  0    predniSONE (DELTASONE) 20 MG tablet     Sig: 3 tabs daily x 2 days; 2 tabs daily x 2 days; 1 tab daily x 2 days; 1/2 tab daily x 2 days; then d/c     Dispense:  13 tablet     Refill:  0     Negative rapid flu A&B and COVID in the office. Zpak, promeathazine DM and prednisone taper as prescribed  - directions for use and side effects discussed. Rest; drink plenty of fluids. Supplement immune system with Vitamin D 2000 international units  daily, Vitamin C 500mg daily and Zinc 50mg daily.     Martha Beltran, APRN - CNP

## 2023-10-11 DIAGNOSIS — J01.90 ACUTE RHINOSINUSITIS: ICD-10-CM

## 2023-10-11 DIAGNOSIS — R05.1 ACUTE COUGH: Primary | ICD-10-CM

## 2023-10-11 RX ORDER — AZITHROMYCIN 250 MG/1
250 TABLET, FILM COATED ORAL SEE ADMIN INSTRUCTIONS
Qty: 6 TABLET | Refills: 0 | Status: SHIPPED | OUTPATIENT
Start: 2023-10-11 | End: 2023-10-16

## 2023-10-11 RX ORDER — DEXTROMETHORPHAN HYDROBROMIDE AND PROMETHAZINE HYDROCHLORIDE 15; 6.25 MG/5ML; MG/5ML
5 SYRUP ORAL 4 TIMES DAILY PRN
Qty: 120 ML | Refills: 0 | Status: SHIPPED | OUTPATIENT
Start: 2023-10-11 | End: 2023-10-18

## 2023-10-11 NOTE — TELEPHONE ENCOUNTER
Pt with persistent congestion, cough    Requested Prescriptions     Signed Prescriptions Disp Refills    azithromycin (ZITHROMAX) 250 MG tablet 6 tablet 0     Sig: Take 1 tablet by mouth See Admin Instructions for 5 days 500mg on day 1 followed by 250mg on days 2 - 5     Authorizing Provider: Sarah EWING    promethazine-dextromethorphan (PROMETHAZINE-DM) 6.25-15 MG/5ML syrup 120 mL 0     Sig: Take 5 mLs by mouth 4 times daily as needed for Cough     Authorizing Provider: KACI Palomares - CNP

## 2023-10-18 ENCOUNTER — TELEPHONE (OUTPATIENT)
Dept: INTERNAL MEDICINE CLINIC | Facility: CLINIC | Age: 63
End: 2023-10-18

## 2023-10-18 NOTE — TELEPHONE ENCOUNTER
Patient  wanted to know if she can get flu shot next Tuesday if she still has a cough? Also, your thought on the RSV. ...in her opinion would she recommend that shot for me?

## 2023-10-18 NOTE — TELEPHONE ENCOUNTER
Yes - she can get flu shot as long as she doesn't have fever and generally feels well. I would hold off on RSV vaccine until at last two weeks after flu vaccine or when cough resolves.     Rubin Vargas, APRN - CNP

## 2023-12-01 ENCOUNTER — IMMUNIZATION (OUTPATIENT)
Dept: PHARMACY | Age: 63
End: 2023-12-01

## 2024-01-03 DIAGNOSIS — F51.01 PRIMARY INSOMNIA: ICD-10-CM

## 2024-01-03 RX ORDER — CLONAZEPAM 1 MG/1
TABLET ORAL
Qty: 30 TABLET | Refills: 5 | Status: SHIPPED | OUTPATIENT
Start: 2024-01-03 | End: 2024-02-03

## 2024-02-07 ENCOUNTER — OFFICE VISIT (OUTPATIENT)
Dept: INTERNAL MEDICINE CLINIC | Facility: CLINIC | Age: 64
End: 2024-02-07
Payer: COMMERCIAL

## 2024-02-07 VITALS
DIASTOLIC BLOOD PRESSURE: 70 MMHG | HEIGHT: 63 IN | WEIGHT: 163 LBS | BODY MASS INDEX: 28.88 KG/M2 | SYSTOLIC BLOOD PRESSURE: 124 MMHG

## 2024-02-07 DIAGNOSIS — F41.8 TEST ANXIETY: ICD-10-CM

## 2024-02-07 DIAGNOSIS — E04.2 MULTINODULAR GOITER: ICD-10-CM

## 2024-02-07 DIAGNOSIS — Z00.00 WELLNESS EXAMINATION: Primary | ICD-10-CM

## 2024-02-07 PROCEDURE — 99396 PREV VISIT EST AGE 40-64: CPT | Performed by: INTERNAL MEDICINE

## 2024-02-07 PROCEDURE — 3078F DIAST BP <80 MM HG: CPT | Performed by: INTERNAL MEDICINE

## 2024-02-07 PROCEDURE — 3074F SYST BP LT 130 MM HG: CPT | Performed by: INTERNAL MEDICINE

## 2024-02-07 RX ORDER — DICLOFENAC SODIUM 75 MG/1
75 TABLET, DELAYED RELEASE ORAL 2 TIMES DAILY
COMMUNITY
Start: 2024-01-11

## 2024-02-07 RX ORDER — LORAZEPAM 1 MG/1
TABLET ORAL
Qty: 5 TABLET | Refills: 0 | Status: SHIPPED | OUTPATIENT
Start: 2024-02-07 | End: 2024-03-07

## 2024-02-07 ASSESSMENT — PATIENT HEALTH QUESTIONNAIRE - PHQ9
2. FEELING DOWN, DEPRESSED OR HOPELESS: 0
SUM OF ALL RESPONSES TO PHQ QUESTIONS 1-9: 0
1. LITTLE INTEREST OR PLEASURE IN DOING THINGS: 0
SUM OF ALL RESPONSES TO PHQ QUESTIONS 1-9: 0
SUM OF ALL RESPONSES TO PHQ9 QUESTIONS 1 & 2: 0
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
## 2024-03-01 ENCOUNTER — NURSE ONLY (OUTPATIENT)
Dept: INTERNAL MEDICINE CLINIC | Facility: CLINIC | Age: 64
End: 2024-03-01
Payer: COMMERCIAL

## 2024-03-01 DIAGNOSIS — R35.0 FREQUENT URINATION: ICD-10-CM

## 2024-03-01 DIAGNOSIS — R30.0 DYSURIA: Primary | ICD-10-CM

## 2024-03-01 DIAGNOSIS — R82.90 ABNORMAL URINE ODOR: ICD-10-CM

## 2024-03-01 LAB
BILIRUBIN, URINE, POC: NEGATIVE
BLOOD URINE, POC: NEGATIVE
GLUCOSE URINE, POC: NEGATIVE
KETONES, URINE, POC: NEGATIVE
LEUKOCYTE ESTERASE, URINE, POC: ABNORMAL
NITRITE, URINE, POC: NEGATIVE
PH, URINE, POC: 5.5 (ref 4.6–8)
PROTEIN,URINE, POC: NEGATIVE
SPECIFIC GRAVITY, URINE, POC: 1.02 (ref 1–1.03)
URINALYSIS CLARITY, POC: ABNORMAL
URINALYSIS COLOR, POC: YELLOW
UROBILINOGEN, POC: ABNORMAL

## 2024-03-01 PROCEDURE — 81003 URINALYSIS AUTO W/O SCOPE: CPT | Performed by: NURSE PRACTITIONER

## 2024-03-01 RX ORDER — NITROFURANTOIN 25; 75 MG/1; MG/1
100 CAPSULE ORAL 2 TIMES DAILY
Qty: 14 CAPSULE | Refills: 0 | Status: SHIPPED | OUTPATIENT
Start: 2024-03-01 | End: 2024-03-08

## 2024-03-01 NOTE — PROGRESS NOTES
Results for orders placed or performed in visit on 03/01/24   AMB POC URINALYSIS DIP STICK AUTO W/O MICRO   Result Value Ref Range    Color (UA POC) Yellow     Clarity (UA POC) Slightly Cloudy     Glucose, Urine, POC Negative     Bilirubin, Urine, POC Negative     KETONES, Urine, POC Negative     Specific Gravity, Urine, POC 1.020 1.001 - 1.035    Blood (UA POC) Negative     pH, Urine, POC 5.5 4.6 - 8.0    Protein, Urine, POC Negative     Urobilinogen, POC 0.2 mg/dL     Nitrite, Urine, POC Negative     Leukocyte Esterase, Urine, POC Trace      Assessment/Plan:  1. Dysuria  -     AMB POC URINALYSIS DIP STICK AUTO W/O MICRO  -     Culture, Urine; Future  -     nitrofurantoin, macrocrystal-monohydrate, (MACROBID) 100 MG capsule; Take 1 capsule by mouth 2 times daily for 7 days, Disp-14 capsule, R-0Normal  2. Frequent urination  -     AMB POC URINALYSIS DIP STICK AUTO W/O MICRO  -     Culture, Urine; Future  3. Abnormal urine odor  -     AMB POC URINALYSIS DIP STICK AUTO W/O MICRO  -     Culture, Urine; Future    Urine sent for culture - will contact with results.  MacroBID as prescribed - directions for use and side effects discussed.  Increase water intake; add cranberry supplement.  Avoid tight fitting clothes.     Call or return to clinic prn if these symptoms worsen or fail to improve as anticipated..    Julia K Paduano, APRN - CNP

## 2024-03-01 NOTE — ADDENDUM NOTE
Addended by: PADUANO, JULIA on: 3/1/2024 10:37 AM     Modules accepted: Orders, Level of Service

## 2024-03-03 LAB
BACTERIA SPEC CULT: NORMAL
SERVICE CMNT-IMP: NORMAL

## 2024-03-04 LAB
BACTERIA SPEC CULT: NORMAL
BACTERIA SPEC CULT: NORMAL
SERVICE CMNT-IMP: NORMAL

## 2024-04-16 ENCOUNTER — HOSPITAL ENCOUNTER (OUTPATIENT)
Dept: ULTRASOUND IMAGING | Age: 64
Discharge: HOME | End: 2024-04-18
Attending: INTERNAL MEDICINE
Payer: COMMERCIAL

## 2024-04-16 DIAGNOSIS — E04.2 MULTINODULAR GOITER: ICD-10-CM

## 2024-04-16 PROCEDURE — 76536 US EXAM OF HEAD AND NECK: CPT

## 2024-05-02 RX ORDER — ELETRIPTAN HYDROBROMIDE 20 MG/1
TABLET, FILM COATED ORAL
Qty: 18 TABLET | Refills: 2 | Status: SHIPPED | OUTPATIENT
Start: 2024-05-02

## 2024-05-02 NOTE — TELEPHONE ENCOUNTER
Requested Prescriptions     Signed Prescriptions Disp Refills    eletriptan (RELPAX) 20 MG tablet 18 tablet 2     Sig: TAKE ONE TABLET BY MOUTH ONCE AS NEEDED FOR MIGRAINE FOR UP TO ONE DOSE. MAY REPEAT IN 2 HOURS IF NECESSARY     Authorizing Provider: PADUANO, JULIA SCHMIDT

## 2024-05-16 LAB
ESTIMATED AVERAGE GLUCOSE: 126
HBA1C MFR BLD: 6 %

## 2024-05-22 RX ORDER — OLMESARTAN MEDOXOMIL 20 MG/1
TABLET ORAL
Qty: 90 TABLET | Refills: 3 | Status: SHIPPED | OUTPATIENT
Start: 2024-05-22

## 2024-05-24 ENCOUNTER — TELEMEDICINE (OUTPATIENT)
Dept: INTERNAL MEDICINE CLINIC | Facility: CLINIC | Age: 64
End: 2024-05-24
Payer: COMMERCIAL

## 2024-05-24 DIAGNOSIS — R42 VERTIGO: Primary | ICD-10-CM

## 2024-05-24 DIAGNOSIS — I10 ESSENTIAL HYPERTENSION: ICD-10-CM

## 2024-05-24 PROCEDURE — 99213 OFFICE O/P EST LOW 20 MIN: CPT | Performed by: NURSE PRACTITIONER

## 2024-05-24 RX ORDER — MECLIZINE HYDROCHLORIDE 25 MG/1
25 TABLET ORAL 3 TIMES DAILY PRN
Qty: 30 TABLET | Refills: 0 | Status: SHIPPED | OUTPATIENT
Start: 2024-05-24 | End: 2024-06-03

## 2024-05-24 RX ORDER — METHOTREXATE 25 MG/ML
0.6 INJECTION, SOLUTION INTRA-ARTERIAL; INTRAMUSCULAR; INTRAVENOUS WEEKLY
COMMUNITY
Start: 2024-05-03

## 2024-05-24 ASSESSMENT — ENCOUNTER SYMPTOMS
SINUS PRESSURE: 0
RESPIRATORY NEGATIVE: 1
SINUS PAIN: 0

## 2024-05-24 NOTE — PROGRESS NOTES
Suzanne Lundberg, was evaluated through a synchronous (real-time) audio-video encounter. The patient (or guardian if applicable) is aware that this is a billable service, which includes applicable co-pays. This Virtual Visit was conducted with patient's (and/or legal guardian's) consent. Patient identification was verified, and a caregiver was present when appropriate.   The patient was located at Home: [address]   [place] [address]  Provider was located at Home (Appt Dept State): [place]  Confirm you are appropriately licensed, registered, or certified to deliver care in the state where the patient is located as indicated above. If you are not or unsure, please re-schedule the visit: Yes, I confirm.     Suzanne Lundberg (:  1960) is a Established patient, presenting virtually for evaluation of the following:    Assessment & Plan   Below is the assessment and plan developed based on review of pertinent history, physical exam, labs, studies, and medications.  1. Vertigo  -     meclizine (ANTIVERT) 25 MG tablet; Take 1 tablet by mouth 3 times daily as needed for Dizziness or Nausea, Disp-30 tablet, R-0Normal  2. Essential hypertension    Meclizine as prescribed  - directions for use and side effects discussed. Cautioned on sedation.  Monitor BP with goal <140/90. Controlled today.  Push fluids.  Cawthorne exercises sent via [employer].    ER with progressive headache, elevated blood pressure, vomiting or any other concerning symptoms. She is agreeable.    Return if symptoms worsen or fail to improve.       Subjective   Suzanne Lundberg (: 1960) c/o vomiting starting at 2am yesterday morning. She tried to get up, but was very dizzy.  Her  has been helping her to the bathroom. She states everything is spinning. She has been taking Zofran yesterday. She has a mild headache today \"4\" on 0-10 pain scale.  Denies visual disturbance, numbness/tingling, weakness, vocal changes, etc.  She

## 2024-05-26 ENCOUNTER — HOSPITAL ENCOUNTER (EMERGENCY)
Age: 64
Discharge: HOME | End: 2024-05-26
Attending: EMERGENCY MEDICINE
Payer: COMMERCIAL

## 2024-05-26 ENCOUNTER — APPOINTMENT (OUTPATIENT)
Dept: CT IMAGING | Age: 64
End: 2024-05-26
Payer: COMMERCIAL

## 2024-05-26 VITALS
HEIGHT: 63 IN | BODY MASS INDEX: 28.35 KG/M2 | WEIGHT: 160 LBS | SYSTOLIC BLOOD PRESSURE: 140 MMHG | DIASTOLIC BLOOD PRESSURE: 72 MMHG | OXYGEN SATURATION: 99 % | HEART RATE: 61 BPM | RESPIRATION RATE: 17 BRPM | TEMPERATURE: 98 F

## 2024-05-26 DIAGNOSIS — R42 VERTIGO: Primary | ICD-10-CM

## 2024-05-26 LAB
ALBUMIN SERPL-MCNC: 3.9 G/DL (ref 3.2–4.6)
ALBUMIN/GLOB SERPL: 1.3 (ref 0.4–1.6)
ALP SERPL-CCNC: 85 U/L (ref 45–117)
ALT SERPL-CCNC: 16 U/L (ref 13–61)
ANION GAP SERPL CALC-SCNC: 13 MMOL/L (ref 2–11)
AST SERPL-CCNC: 17 U/L (ref 15–37)
BASOPHILS # BLD: 0.1 K/UL (ref 0–0.2)
BASOPHILS NFR BLD: 1 % (ref 0–2)
BILIRUB SERPL-MCNC: 0.4 MG/DL (ref 0.2–1.1)
BUN SERPL-MCNC: 15 MG/DL (ref 8–23)
CALCIUM SERPL-MCNC: 9.3 MG/DL (ref 8.3–10.4)
CHLORIDE SERPL-SCNC: 107 MMOL/L (ref 98–107)
CO2 SERPL-SCNC: 22 MMOL/L (ref 21–32)
CREAT SERPL-MCNC: 0.77 MG/DL (ref 0.6–1)
DIFFERENTIAL METHOD BLD: ABNORMAL
EKG ATRIAL RATE: 74 BPM
EKG DIAGNOSIS: NORMAL
EKG P AXIS: 48 DEGREES
EKG P-R INTERVAL: 148 MS
EKG Q-T INTERVAL: 430 MS
EKG QRS DURATION: 130 MS
EKG QTC CALCULATION (BAZETT): 481 MS
EKG R AXIS: 59 DEGREES
EKG T AXIS: 27 DEGREES
EKG VENTRICULAR RATE: 75 BPM
EOSINOPHIL # BLD: 0.2 K/UL (ref 0–0.8)
EOSINOPHIL NFR BLD: 2 % (ref 0.5–7.8)
ERYTHROCYTE [DISTWIDTH] IN BLOOD BY AUTOMATED COUNT: 14.3 % (ref 11.9–14.6)
GLOBULIN SER CALC-MCNC: 2.9 G/DL (ref 2.8–4.5)
GLUCOSE SERPL-MCNC: 122 MG/DL (ref 65–100)
HCT VFR BLD AUTO: 43.8 % (ref 35.8–46.3)
HGB BLD-MCNC: 14.5 G/DL (ref 11.7–15.4)
IMM GRANULOCYTES # BLD AUTO: 0.1 K/UL (ref 0–0.5)
IMM GRANULOCYTES NFR BLD AUTO: 1 % (ref 0–5)
LYMPHOCYTES # BLD: 3.1 K/UL (ref 0.5–4.6)
LYMPHOCYTES NFR BLD: 35 % (ref 13–44)
MCH RBC QN AUTO: 30 PG (ref 26.1–32.9)
MCHC RBC AUTO-ENTMCNC: 33.1 G/DL (ref 31.4–35)
MCV RBC AUTO: 90.5 FL (ref 82–102)
MONOCYTES # BLD: 1 K/UL (ref 0.1–1.3)
MONOCYTES NFR BLD: 11 % (ref 4–12)
NEUTS SEG # BLD: 4.3 K/UL (ref 1.7–8.2)
NEUTS SEG NFR BLD: 50 % (ref 43–78)
NRBC # BLD: 0 K/UL (ref 0–0.2)
PLATELET # BLD AUTO: 339 K/UL (ref 150–450)
PMV BLD AUTO: 8.8 FL (ref 9.4–12.3)
POTASSIUM SERPL-SCNC: 3.5 MMOL/L (ref 3.5–5.1)
PROT SERPL-MCNC: 6.8 G/DL (ref 6.4–8.2)
RBC # BLD AUTO: 4.84 M/UL (ref 4.05–5.2)
SODIUM SERPL-SCNC: 142 MMOL/L (ref 133–143)
TROPONIN T SERPL HS-MCNC: 30.8 NG/L (ref 0–14)
TROPONIN T SERPL HS-MCNC: 35.6 NG/L (ref 0–14)
WBC # BLD AUTO: 8.7 K/UL (ref 4.3–11.1)

## 2024-05-26 PROCEDURE — 6360000002 HC RX W HCPCS: Performed by: EMERGENCY MEDICINE

## 2024-05-26 PROCEDURE — 80053 COMPREHEN METABOLIC PANEL: CPT

## 2024-05-26 PROCEDURE — 84484 ASSAY OF TROPONIN QUANT: CPT

## 2024-05-26 PROCEDURE — 99284 EMERGENCY DEPT VISIT MOD MDM: CPT

## 2024-05-26 PROCEDURE — 2580000003 HC RX 258: Performed by: EMERGENCY MEDICINE

## 2024-05-26 PROCEDURE — 85025 COMPLETE CBC W/AUTO DIFF WBC: CPT

## 2024-05-26 PROCEDURE — 6370000000 HC RX 637 (ALT 250 FOR IP): Performed by: EMERGENCY MEDICINE

## 2024-05-26 PROCEDURE — 93010 ELECTROCARDIOGRAM REPORT: CPT | Performed by: INTERNAL MEDICINE

## 2024-05-26 PROCEDURE — 93005 ELECTROCARDIOGRAM TRACING: CPT | Performed by: EMERGENCY MEDICINE

## 2024-05-26 PROCEDURE — 96374 THER/PROPH/DIAG INJ IV PUSH: CPT

## 2024-05-26 PROCEDURE — 70450 CT HEAD/BRAIN W/O DYE: CPT

## 2024-05-26 PROCEDURE — A4216 STERILE WATER/SALINE, 10 ML: HCPCS | Performed by: EMERGENCY MEDICINE

## 2024-05-26 RX ORDER — MECLIZINE HYDROCHLORIDE 25 MG/1
25 TABLET ORAL 3 TIMES DAILY PRN
Qty: 15 TABLET | Refills: 0 | Status: SHIPPED | OUTPATIENT
Start: 2024-05-26 | End: 2024-06-05

## 2024-05-26 RX ORDER — 0.9 % SODIUM CHLORIDE 0.9 %
1000 INTRAVENOUS SOLUTION INTRAVENOUS ONCE
Status: COMPLETED | OUTPATIENT
Start: 2024-05-26 | End: 2024-05-26

## 2024-05-26 RX ORDER — ONDANSETRON 4 MG/1
4 TABLET, ORALLY DISINTEGRATING ORAL 3 TIMES DAILY PRN
Qty: 21 TABLET | Refills: 0 | Status: SHIPPED | OUTPATIENT
Start: 2024-05-26

## 2024-05-26 RX ORDER — LORAZEPAM 1 MG/1
1 TABLET ORAL EVERY 6 HOURS PRN
Qty: 20 TABLET | Refills: 0 | Status: SHIPPED | OUTPATIENT
Start: 2024-05-26 | End: 2024-06-25

## 2024-05-26 RX ORDER — MECLIZINE HYDROCHLORIDE 25 MG/1
25 TABLET ORAL
Status: COMPLETED | OUTPATIENT
Start: 2024-05-26 | End: 2024-05-26

## 2024-05-26 RX ADMIN — MECLIZINE HYDROCHLORIDE 25 MG: 25 TABLET ORAL at 08:22

## 2024-05-26 RX ADMIN — LORAZEPAM 1 MG: 2 INJECTION INTRAMUSCULAR; INTRAVENOUS at 08:21

## 2024-05-26 RX ADMIN — SODIUM CHLORIDE 1000 ML: 9 INJECTION, SOLUTION INTRAVENOUS at 08:22

## 2024-05-26 ASSESSMENT — LIFESTYLE VARIABLES
HOW OFTEN DO YOU HAVE A DRINK CONTAINING ALCOHOL: 2-4 TIMES A MONTH
HOW MANY STANDARD DRINKS CONTAINING ALCOHOL DO YOU HAVE ON A TYPICAL DAY: 1 OR 2

## 2024-05-26 ASSESSMENT — PAIN SCALES - GENERAL: PAINLEVEL_OUTOF10: 0

## 2024-05-26 ASSESSMENT — PAIN - FUNCTIONAL ASSESSMENT: PAIN_FUNCTIONAL_ASSESSMENT: 0-10

## 2024-05-26 NOTE — ED PROVIDER NOTES
Emergency Department Provider Note       PCP: Medina Kamara MD   Age: 63 y.o.   Sex: female     DISPOSITION Decision To Discharge 05/26/2024 09:42:33 AM       ICD-10-CM    1. Vertigo  R42 LORazepam (ATIVAN) 1 MG tablet          Medical Decision Making     Patient is a 63-year-old female who presents with nausea and vertigo symptoms room spinning worse with head movement starting on Thursday.  Patient states she was seen in a virtual care visit and prescribed meclizine as well as Cawthorne exercises and has not significantly improved her symptoms.  Patient denies any associated chest pain or chest pressure shortness of breath nausea vomiting.  Patient states she does have a mild headache intermittently no headache currently.  Patient has no history of vertigo and denies any fevers or chills or cold cough or congestion or blurry vision and denies any slurred speech difficulty speaking or word finding issues.      Dizziness  Quality:  Head spinning and vertigo  Severity:  Moderate  Onset quality:  Gradual  Duration:  4 days  Timing:  Intermittent  Progression:  Partially resolved  Chronicity:  New  Context: bending over and head movement    Relieved by:  Nothing  Worsened by:  Movement and standing up  Ineffective treatments:  Being still  Associated symptoms: headaches and nausea    Associated symptoms: no blood in stool, no chest pain, no diarrhea, no hearing loss, no palpitations, no shortness of breath, no vision changes, no vomiting and no weakness    Risk factors: no anemia and no hx of vertigo        Differential diagnosis includes was not limited to vertigo, MANUEL, ACS, CVA    Patient's physical exam is remarkable for reproducible dizziness with head movement.    Patient is laboratory testing is unremarkable troponin x 2 trending downward which is reassuring.  Patient had no chest pain or associated palpitations or pressure.  Patient CT head is negative.  Patient was given Ativan 1 mg IV here as well

## 2024-05-26 NOTE — ED NOTES
Patient mobility status  with no difficulty. Provider aware     I have reviewed discharge instructions with the patient.  The patient verbalized understanding.    Patient left ED via Discharge Method: ambulatory to Home with Spouse.    Opportunity for questions and clarification provided.     Patient given 3 scripts.

## 2024-05-26 NOTE — ED TRIAGE NOTES
Pt presents to ED with c/o dizziness r/t vertigo and nausea. Had a video visit with primary Friday and was put on meclizine, no relief.

## 2024-05-28 ENCOUNTER — TELEPHONE (OUTPATIENT)
Dept: INTERNAL MEDICINE CLINIC | Facility: CLINIC | Age: 64
End: 2024-05-28

## 2024-05-28 DIAGNOSIS — R42 VERTIGO: Primary | ICD-10-CM

## 2024-05-28 NOTE — TELEPHONE ENCOUNTER
Pt went to ER two days ago; still with vertigo despite meclizine.  Labs, head CT unremarkable. BP normal.    Referral to vestibular therapy.    Orders Placed This Encounter   Procedures    BS - Physical Therapy, [facility]     Referral Priority:   Urgent     Referral Type:   Eval and Treat     Referral Reason:   Physical Therapy     Requested Specialty:   Physical Therapist     Number of Visits Requested:   1     Pt notified in office.    Julia K Paduano, KACI - CNP

## 2024-06-04 ENCOUNTER — HOSPITAL ENCOUNTER (OUTPATIENT)
Dept: PHYSICAL THERAPY | Age: 64
Setting detail: RECURRING SERIES
Discharge: HOME | End: 2024-06-07
Payer: COMMERCIAL

## 2024-06-04 DIAGNOSIS — R29.3 ABNORMAL POSTURE: ICD-10-CM

## 2024-06-04 DIAGNOSIS — R26.81 UNSTEADINESS ON FEET: ICD-10-CM

## 2024-06-04 DIAGNOSIS — R42 DIZZINESS AND GIDDINESS: Primary | ICD-10-CM

## 2024-06-04 PROCEDURE — 97112 NEUROMUSCULAR REEDUCATION: CPT

## 2024-06-04 PROCEDURE — 97161 PT EVAL LOW COMPLEX 20 MIN: CPT

## 2024-06-04 ASSESSMENT — PAIN SCALES - GENERAL: PAINLEVEL_OUTOF10: 0

## 2024-06-04 NOTE — PROGRESS NOTES
Suzanne Lundberg  : 1960  Primary: Luis M Avila (Francesco GREGORY)  Secondary:  [facility] @ [place] [address] [place] [address]  Phone: [phone]  Fax: [phone] Plan Frequency: 1 time a week for up to 90 days    Plan of Care/Certification Expiration Date: 24        Plan of Care/Certification Expiration Date:  Plan of Care/Certification Expiration Date: 24    Frequency/Duration:   Plan Frequency: 1 time a week for up to 90 days      Time In/Out:   Time In: 0800  Time Out: 0837      PT Visit Info:    Progress Note Counter: 1      Visit Count:  1    OUTPATIENT PHYSICAL THERAPY:   Treatment Note 2024       Episode  (vertigo)               Treatment Diagnosis:    Dizziness and giddiness  Abnormal posture  Unsteadiness on feet  Medical/Referring Diagnosis:    Vertigo [R42]    Referring Physician:  Paduano, Julia Schmidt, APRN - CNP MD Orders:  PT Eval and Treat   Return MD Appt:  unknown   Date of Onset:  Onset Date: 24     Allergies:   Lamisil [terbinafine]  Restrictions/Precautions:   None      Interventions Planned (Treatment may consist of any combination of the following):     See Assessment Note    Subjective Comments:   See eval  Initial Pain Level::     0/10  Post Session Pain Level:       0/10  Medications Last Reviewed:  2024  Updated Objective Findings:  See Evaluation Note from today  Treatment   NEUROMUSCULAR RE-EDUCATION: (10 minutes):    Exercise/activities per grid below to improve balance, coordination, posture, and proprioception.  Required moderate visual and verbal cues to promote static and dynamic balance in standing.     Date:  2024   Activity/Exercise Parameters   VOR x 1 in sitting Reviewed for HEP vertical and horizontal, how to progress                                  Treatment/Session Summary:    Treatment Assessment:   Patient demonstrated understanding of HEP  Communication/Consultation:  None today  Equipment
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
## 2024-06-11 ENCOUNTER — APPOINTMENT (OUTPATIENT)
Dept: PHYSICAL THERAPY | Age: 64
End: 2024-06-11
Payer: COMMERCIAL

## 2024-06-14 ENCOUNTER — APPOINTMENT (OUTPATIENT)
Dept: PHYSICAL THERAPY | Age: 64
End: 2024-06-14
Payer: COMMERCIAL

## 2024-06-18 ENCOUNTER — APPOINTMENT (OUTPATIENT)
Dept: PHYSICAL THERAPY | Age: 64
End: 2024-06-18
Payer: COMMERCIAL

## 2024-06-25 ENCOUNTER — APPOINTMENT (OUTPATIENT)
Dept: PHYSICAL THERAPY | Age: 64
End: 2024-06-25
Payer: COMMERCIAL

## 2024-08-07 ENCOUNTER — OFFICE VISIT (OUTPATIENT)
Dept: INTERNAL MEDICINE CLINIC | Facility: CLINIC | Age: 64
End: 2024-08-07
Payer: COMMERCIAL

## 2024-08-07 VITALS
DIASTOLIC BLOOD PRESSURE: 78 MMHG | WEIGHT: 159 LBS | SYSTOLIC BLOOD PRESSURE: 128 MMHG | HEIGHT: 63 IN | BODY MASS INDEX: 28.17 KG/M2

## 2024-08-07 DIAGNOSIS — R42 PAROXYSMAL VERTIGO: ICD-10-CM

## 2024-08-07 DIAGNOSIS — F51.01 PRIMARY INSOMNIA: ICD-10-CM

## 2024-08-07 DIAGNOSIS — F41.9 ANXIETY: ICD-10-CM

## 2024-08-07 DIAGNOSIS — R73.03 PREDIABETES: ICD-10-CM

## 2024-08-07 DIAGNOSIS — R42 VERTIGO: ICD-10-CM

## 2024-08-07 DIAGNOSIS — R63.5 WEIGHT GAIN: ICD-10-CM

## 2024-08-07 DIAGNOSIS — E89.0 POSTOPERATIVE HYPOTHYROIDISM: ICD-10-CM

## 2024-08-07 DIAGNOSIS — I10 ESSENTIAL HYPERTENSION: Primary | ICD-10-CM

## 2024-08-07 PROCEDURE — 3074F SYST BP LT 130 MM HG: CPT | Performed by: INTERNAL MEDICINE

## 2024-08-07 PROCEDURE — 99214 OFFICE O/P EST MOD 30 MIN: CPT | Performed by: INTERNAL MEDICINE

## 2024-08-07 PROCEDURE — 3078F DIAST BP <80 MM HG: CPT | Performed by: INTERNAL MEDICINE

## 2024-08-07 RX ORDER — FOLIC ACID 1 MG/1
1 TABLET ORAL DAILY
COMMUNITY
Start: 2024-07-16

## 2024-08-07 RX ORDER — CLONAZEPAM 1 MG/1
TABLET ORAL
Qty: 10 TABLET | Refills: 0 | Status: SHIPPED | OUTPATIENT
Start: 2024-08-07 | End: 2024-09-11

## 2024-08-07 RX ORDER — MECLIZINE HYDROCHLORIDE 25 MG/1
25 TABLET ORAL 3 TIMES DAILY PRN
COMMUNITY
Start: 2024-06-26

## 2024-08-07 RX ORDER — BUSPIRONE HYDROCHLORIDE 10 MG/1
10 TABLET ORAL DAILY
Qty: 90 TABLET | Refills: 0 | Status: SHIPPED | OUTPATIENT
Start: 2024-08-07 | End: 2024-11-05

## 2024-08-07 SDOH — ECONOMIC STABILITY: FOOD INSECURITY: WITHIN THE PAST 12 MONTHS, YOU WORRIED THAT YOUR FOOD WOULD RUN OUT BEFORE YOU GOT MONEY TO BUY MORE.: NEVER TRUE

## 2024-08-07 SDOH — ECONOMIC STABILITY: FOOD INSECURITY: WITHIN THE PAST 12 MONTHS, THE FOOD YOU BOUGHT JUST DIDN'T LAST AND YOU DIDN'T HAVE MONEY TO GET MORE.: NEVER TRUE

## 2024-08-07 SDOH — ECONOMIC STABILITY: INCOME INSECURITY: HOW HARD IS IT FOR YOU TO PAY FOR THE VERY BASICS LIKE FOOD, HOUSING, MEDICAL CARE, AND HEATING?: NOT HARD AT ALL

## 2024-08-07 ASSESSMENT — ENCOUNTER SYMPTOMS: SHORTNESS OF BREATH: 0

## 2024-08-07 NOTE — PROGRESS NOTES
HPI: Suzanne Lundberg (: 1960)    Needs refill on meds     Still feels like she is having vertigo symptoms at times  Had the episode that was severe that awoke her one am in the middle of the night with N/V but has not had  Another episode like that one    Having more issues with anxiety darrius on days that are busy at work    Did start Wegovy to help with her wt gain and to lower her blood sugar          Problem List:  Patient Active Problem List   Diagnosis   • Goiter   • Muscle ache of extremity   • Prediabetes   • Multinodular goiter   • Hypercholesterolemia   • Postoperative hypothyroidism   • Insomnia   • Dense breasts   • Perimenopausal   • Vaginal prolapse   • Rosacea   • Essential hypertension   • Family history of breast cancer in first degree relative   • Hip flexor tightness       History:  Past Medical History:   Diagnosis Date   • Abnormal finding on EKG     RBBB   • Chicken pox    • COVID-19 vaccine series completed     Pfizer vaccine completed X3 doses   • Depression    • Former cigarette smoker    • Headache     migraine   • Insomnia, transient 12   • Measles    • Multinodular goiter 2020    Dr. Ortiz   • Mumps    • Routine eye exam     Dr. 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-- no meds required    • Uterine prolapse 12       Allergies:  Allergies   Allergen Reactions   • Lamisil [Terbinafine] Rash       Current Medications:  Current Outpatient Medications   Medication Sig Dispense Refill   • clonazePAM (KLONOPIN) 1 MG tablet TAKE ONE TABLET BY MOUTH IN THE EVENING 10 tablet 0   • folic acid (FOLVITE) 1 MG tablet Take 1 tablet by mouth daily     • meclizine (ANTIVERT) 25 MG tablet Take 1 tablet by mouth 3 times daily as needed for Dizziness     • ondansetron (ZOFRAN-ODT) 4 MG disintegrating tablet Take 1 tablet by mouth 3 times daily as needed for Nausea or Vomiting 21 tablet 0   • methotrexate Sodium 50 MG/2ML chemo injection Inject 0.6

## 2024-08-22 DIAGNOSIS — F51.01 PRIMARY INSOMNIA: ICD-10-CM

## 2024-08-23 RX ORDER — CLONAZEPAM 1 MG/1
TABLET ORAL
Qty: 30 TABLET | Refills: 5 | Status: SHIPPED | OUTPATIENT
Start: 2024-08-23 | End: 2024-09-26

## 2024-09-03 ENCOUNTER — TRANSCRIBE ORDERS (OUTPATIENT)
Dept: SCHEDULING | Age: 64
End: 2024-09-03

## 2024-09-03 DIAGNOSIS — Z12.31 SCREENING MAMMOGRAM FOR BREAST CANCER: Primary | ICD-10-CM

## 2024-09-04 ENCOUNTER — OFFICE VISIT (OUTPATIENT)
Dept: INTERNAL MEDICINE CLINIC | Facility: CLINIC | Age: 64
End: 2024-09-04

## 2024-09-04 VITALS
HEART RATE: 80 BPM | BODY MASS INDEX: 27.29 KG/M2 | OXYGEN SATURATION: 100 % | WEIGHT: 154 LBS | SYSTOLIC BLOOD PRESSURE: 98 MMHG | HEIGHT: 63 IN | DIASTOLIC BLOOD PRESSURE: 60 MMHG

## 2024-09-04 DIAGNOSIS — J02.9 ACUTE PHARYNGITIS, UNSPECIFIED ETIOLOGY: Primary | ICD-10-CM

## 2024-09-04 DIAGNOSIS — R51.9 ACUTE NONINTRACTABLE HEADACHE, UNSPECIFIED HEADACHE TYPE: ICD-10-CM

## 2024-09-04 LAB
EXP DATE SOLUTION: NORMAL
EXP DATE SWAB: NORMAL
EXPIRATION DATE: NORMAL
INFLUENZA A RNA, POC: NORMAL
INFLUENZA B RNA, POC: NORMAL
LOT NUMBER POC: NORMAL
LOT NUMBER SOLUTION: NORMAL
LOT NUMBER SWAB: NORMAL
SARS-COV-2 RNA, POC: NEGATIVE
VALID INTERNAL CONTROL: NORMAL

## 2024-09-04 RX ORDER — AZITHROMYCIN 250 MG/1
TABLET, FILM COATED ORAL
Qty: 6 TABLET | Refills: 0 | Status: SHIPPED | OUTPATIENT
Start: 2024-09-04 | End: 2024-09-14

## 2024-09-04 NOTE — PROGRESS NOTES
Suzanne Lundberg (: 1960) presents today c/o sore throat, ears plugged, nasal congestion, runny nose, headache starting yesterday. She just returned from trip to [place]. No ill contacts.    Of note, she took methotrexate yesterday.    Chief Complaint   Patient presents with    Pharyngitis     Nasal congestion, runny nose, headache, ears hurt, body aches some, cold.      Patient Active Problem List   Diagnosis    Goiter    Muscle ache of extremity    Prediabetes    Multinodular goiter    Hypercholesterolemia    Postoperative hypothyroidism    Insomnia    Dense breasts    Perimenopausal    Vaginal prolapse    Rosacea    Essential hypertension    Family history of breast cancer in first degree relative    Hip flexor tightness        Reviewed and updated this visit by provider:  Tobacco  Allergies  Meds  Problems  Med Hx  Surg Hx  Fam Hx       Immunizations:  Immunization status: up to date and documented.    Review of Systems - Negative except as stated in HPI  History obtained from chart review and the patient  General ROS: positive for  - fatigue and body aches  negative for - fever  ENT ROS: positive for - nasal congestion, sore throat, and sinus congestion  Respiratory ROS: positive for - cough  negative for - shortness of breath or sputum changes    Visit Vitals  BP 98/60 (Site: Left Upper Arm, Position: Sitting)   Pulse 80   Ht 1.6 m (5' 3\")   Wt 69.9 kg (154 lb)   SpO2 100%   BMI 27.28 kg/m²     Physical Examination: General appearance - alert, well appearing, and in no distress, oriented to person, place, and time, normal appearing weight, and acyanotic, in no respiratory distress  Mental status - alert, oriented to person, place, and time, normal mood, behavior, speech, dress, motor activity, and thought processes, affect appropriate to mood  Ears - bilateral TM's and external ear canals normal  Nose - normal and patent, no erythema, discharge or polyps  Mouth - erythematous and exudate

## 2024-09-11 ENCOUNTER — PATIENT MESSAGE (OUTPATIENT)
Dept: INTERNAL MEDICINE CLINIC | Facility: CLINIC | Age: 64
End: 2024-09-11

## 2024-09-11 DIAGNOSIS — J02.9 ACUTE PHARYNGITIS, UNSPECIFIED ETIOLOGY: Primary | ICD-10-CM

## 2024-09-11 RX ORDER — AMOXICILLIN 875 MG
875 TABLET ORAL 2 TIMES DAILY
Qty: 20 TABLET | Refills: 0 | Status: SHIPPED | OUTPATIENT
Start: 2024-09-11 | End: 2024-09-21

## 2024-10-09 ENCOUNTER — HOSPITAL ENCOUNTER (OUTPATIENT)
Dept: MAMMOGRAPHY | Age: 64
Discharge: HOME | End: 2024-10-12
Attending: INTERNAL MEDICINE
Payer: COMMERCIAL

## 2024-10-09 VITALS — HEIGHT: 63 IN | WEIGHT: 154 LBS | BODY MASS INDEX: 27.29 KG/M2

## 2024-10-09 DIAGNOSIS — Z12.31 SCREENING MAMMOGRAM FOR BREAST CANCER: ICD-10-CM

## 2024-10-09 PROCEDURE — 77063 BREAST TOMOSYNTHESIS BI: CPT

## 2024-10-14 ENCOUNTER — APPOINTMENT (RX ONLY)
Dept: URBAN - METROPOLITAN AREA CLINIC 25 | Facility: CLINIC | Age: 64
Setting detail: DERMATOLOGY
End: 2024-10-14

## 2024-10-14 DIAGNOSIS — L82.1 OTHER SEBORRHEIC KERATOSIS: ICD-10-CM

## 2024-10-14 DIAGNOSIS — L57.8 OTHER SKIN CHANGES DUE TO CHRONIC EXPOSURE TO NONIONIZING RADIATION: ICD-10-CM

## 2024-10-14 DIAGNOSIS — Z71.89 OTHER SPECIFIED COUNSELING: ICD-10-CM

## 2024-10-14 DIAGNOSIS — L81.4 OTHER MELANIN HYPERPIGMENTATION: ICD-10-CM

## 2024-10-14 DIAGNOSIS — D22 MELANOCYTIC NEVI: ICD-10-CM

## 2024-10-14 DIAGNOSIS — D18.0 HEMANGIOMA: ICD-10-CM

## 2024-10-14 DIAGNOSIS — L64.8 OTHER ANDROGENIC ALOPECIA: ICD-10-CM

## 2024-10-14 PROBLEM — D22.5 MELANOCYTIC NEVI OF TRUNK: Status: ACTIVE | Noted: 2024-10-14

## 2024-10-14 PROBLEM — D18.01 HEMANGIOMA OF SKIN AND SUBCUTANEOUS TISSUE: Status: ACTIVE | Noted: 2024-10-14

## 2024-10-14 PROCEDURE — ? COUNSELING

## 2024-10-14 PROCEDURE — ? ADDITIONAL NOTES

## 2024-10-14 PROCEDURE — 99204 OFFICE O/P NEW MOD 45 MIN: CPT

## 2024-10-14 PROCEDURE — ? PRESCRIPTION MEDICATION MANAGEMENT

## 2024-10-14 ASSESSMENT — LOCATION DETAILED DESCRIPTION DERM
LOCATION DETAILED: LEFT CENTRAL EYEBROW
LOCATION DETAILED: RIGHT CENTRAL POSTAURICULAR SKIN
LOCATION DETAILED: LEFT SUPERIOR PARIETAL SCALP
LOCATION DETAILED: RIGHT SUPERIOR FOREHEAD
LOCATION DETAILED: LEFT INFERIOR LATERAL FOREHEAD
LOCATION DETAILED: RIGHT CENTRAL MALAR CHEEK
LOCATION DETAILED: STERNAL NOTCH
LOCATION DETAILED: SUPERIOR THORACIC SPINE
LOCATION DETAILED: LEFT SUPERIOR FOREHEAD
LOCATION DETAILED: LEFT CENTRAL MALAR CHEEK
LOCATION DETAILED: INFERIOR THORACIC SPINE

## 2024-10-14 ASSESSMENT — LOCATION SIMPLE DESCRIPTION DERM
LOCATION SIMPLE: LEFT FOREHEAD
LOCATION SIMPLE: SCALP
LOCATION SIMPLE: UPPER BACK
LOCATION SIMPLE: LEFT EYEBROW
LOCATION SIMPLE: CHEST
LOCATION SIMPLE: RIGHT FOREHEAD
LOCATION SIMPLE: RIGHT CHEEK
LOCATION SIMPLE: LEFT CHEEK

## 2024-10-14 ASSESSMENT — LOCATION ZONE DERM
LOCATION ZONE: TRUNK
LOCATION ZONE: FACE
LOCATION ZONE: SCALP

## 2024-10-14 NOTE — PROCEDURE: PRESCRIPTION MEDICATION MANAGEMENT
Plan: Treatments for hair loss discussed and detailed hand out provided.\\n\\nMinoxidil 5% 1-2x daily (oral in reserve)\\n\\nSupplements: ISDIN, viviscal, or Nutrofol (do not take extra biotin)\\n\\nSpironolactone (women) or finasteride (men or women)\\n\\nPRP injections\\n\\nLLRLT\\n\\nToppik hair fibers \\n\\nAppropriate gentle hair techniques \\n\\nScalp massage\\n\\nNutrition \\n\\nPatient is aware to schedule another appointment to follow up to talk more in-depth about AGA\\nReviewed where to start in regimen with topicals and supplements
Detail Level: Zone
Initiate Treatment: Rogaine 5% 2x daily
Render In Strict Bullet Format?: No

## 2024-10-14 NOTE — PROCEDURE: ADDITIONAL NOTES
Render Risk Assessment In Note?: no
Detail Level: Simple
Additional Notes: Patient can schedule consultation with Estela for laser

## 2024-11-19 RX ORDER — BUSPIRONE HYDROCHLORIDE 10 MG/1
10 TABLET ORAL DAILY
Qty: 90 TABLET | Refills: 1 | Status: SHIPPED | OUTPATIENT
Start: 2024-11-19

## 2025-01-15 DIAGNOSIS — R73.03 PREDIABETES: ICD-10-CM

## 2025-01-15 DIAGNOSIS — F51.01 PRIMARY INSOMNIA: ICD-10-CM

## 2025-01-15 DIAGNOSIS — E89.0 POSTOPERATIVE HYPOTHYROIDISM: ICD-10-CM

## 2025-01-15 DIAGNOSIS — E55.9 VITAMIN D DEFICIENCY: ICD-10-CM

## 2025-01-15 DIAGNOSIS — I10 ESSENTIAL HYPERTENSION: Primary | ICD-10-CM

## 2025-01-15 DIAGNOSIS — E78.00 HYPERCHOLESTEROLEMIA: ICD-10-CM

## 2025-02-06 DIAGNOSIS — R73.03 PREDIABETES: ICD-10-CM

## 2025-02-06 DIAGNOSIS — E55.9 VITAMIN D DEFICIENCY: ICD-10-CM

## 2025-02-06 DIAGNOSIS — E89.0 POSTOPERATIVE HYPOTHYROIDISM: ICD-10-CM

## 2025-02-06 DIAGNOSIS — I10 ESSENTIAL HYPERTENSION: ICD-10-CM

## 2025-02-06 DIAGNOSIS — E78.00 HYPERCHOLESTEROLEMIA: ICD-10-CM

## 2025-02-06 LAB
25(OH)D3 SERPL-MCNC: 31.4 NG/ML (ref 30–100)
ALBUMIN SERPL-MCNC: 3.5 G/DL (ref 3.2–4.6)
ALBUMIN/GLOB SERPL: 1.3 (ref 1–1.9)
ALP SERPL-CCNC: 74 U/L (ref 35–104)
ALT SERPL-CCNC: 15 U/L (ref 8–45)
ANION GAP SERPL CALC-SCNC: 9 MMOL/L (ref 7–16)
AST SERPL-CCNC: 17 U/L (ref 15–37)
BASOPHILS # BLD: 0.08 K/UL (ref 0–0.2)
BASOPHILS NFR BLD: 0.9 % (ref 0–2)
BILIRUB SERPL-MCNC: 0.3 MG/DL (ref 0–1.2)
BUN SERPL-MCNC: 16 MG/DL (ref 8–23)
CALCIUM SERPL-MCNC: 9.3 MG/DL (ref 8.8–10.2)
CHLORIDE SERPL-SCNC: 105 MMOL/L (ref 98–107)
CHOLEST SERPL-MCNC: 194 MG/DL (ref 0–200)
CO2 SERPL-SCNC: 26 MMOL/L (ref 20–29)
CREAT SERPL-MCNC: 0.77 MG/DL (ref 0.6–1.1)
DIFFERENTIAL METHOD BLD: ABNORMAL
EOSINOPHIL # BLD: 0.2 K/UL (ref 0–0.8)
EOSINOPHIL NFR BLD: 2.2 % (ref 0.5–7.8)
ERYTHROCYTE [DISTWIDTH] IN BLOOD BY AUTOMATED COUNT: 13.9 % (ref 11.9–14.6)
EST. AVERAGE GLUCOSE BLD GHB EST-MCNC: 114 MG/DL
GLOBULIN SER CALC-MCNC: 2.8 G/DL (ref 2.3–3.5)
GLUCOSE SERPL-MCNC: 87 MG/DL (ref 70–99)
HBA1C MFR BLD: 5.6 % (ref 0–5.6)
HCT VFR BLD AUTO: 40.7 % (ref 35.8–46.3)
HDLC SERPL-MCNC: 44 MG/DL (ref 40–60)
HDLC SERPL: 4.4 (ref 0–5)
HGB BLD-MCNC: 13.3 G/DL (ref 11.7–15.4)
IMM GRANULOCYTES # BLD AUTO: 0.05 K/UL (ref 0–0.5)
IMM GRANULOCYTES NFR BLD AUTO: 0.6 % (ref 0–5)
LDLC SERPL CALC-MCNC: 137 MG/DL (ref 0–100)
LYMPHOCYTES # BLD: 2.2 K/UL (ref 0.5–4.6)
LYMPHOCYTES NFR BLD: 24.3 % (ref 13–44)
MCH RBC QN AUTO: 32 PG (ref 26.1–32.9)
MCHC RBC AUTO-ENTMCNC: 32.7 G/DL (ref 31.4–35)
MCV RBC AUTO: 98.1 FL (ref 82–102)
MONOCYTES # BLD: 1.38 K/UL (ref 0.1–1.3)
MONOCYTES NFR BLD: 15.2 % (ref 4–12)
NEUTS SEG # BLD: 5.15 K/UL (ref 1.7–8.2)
NEUTS SEG NFR BLD: 56.8 % (ref 43–78)
NRBC # BLD: 0 K/UL (ref 0–0.2)
PLATELET # BLD AUTO: 327 K/UL (ref 150–450)
PMV BLD AUTO: 9.7 FL (ref 9.4–12.3)
POTASSIUM SERPL-SCNC: 3.9 MMOL/L (ref 3.5–5.1)
PROT SERPL-MCNC: 6.3 G/DL (ref 6.3–8.2)
RBC # BLD AUTO: 4.15 M/UL (ref 4.05–5.2)
SODIUM SERPL-SCNC: 140 MMOL/L (ref 136–145)
TRIGL SERPL-MCNC: 62 MG/DL (ref 0–150)
TSH, 3RD GENERATION: 1.65 UIU/ML (ref 0.27–4.2)
VLDLC SERPL CALC-MCNC: 12 MG/DL (ref 6–23)
WBC # BLD AUTO: 9.1 K/UL (ref 4.3–11.1)

## 2025-02-18 ASSESSMENT — PATIENT HEALTH QUESTIONNAIRE - PHQ9
SUM OF ALL RESPONSES TO PHQ9 QUESTIONS 1 & 2: 0
SUM OF ALL RESPONSES TO PHQ QUESTIONS 1-9: 0
1. LITTLE INTEREST OR PLEASURE IN DOING THINGS: NOT AT ALL
SUM OF ALL RESPONSES TO PHQ QUESTIONS 1-9: 0
1. LITTLE INTEREST OR PLEASURE IN DOING THINGS: NOT AT ALL
SUM OF ALL RESPONSES TO PHQ QUESTIONS 1-9: 0
2. FEELING DOWN, DEPRESSED OR HOPELESS: NOT AT ALL
SUM OF ALL RESPONSES TO PHQ9 QUESTIONS 1 & 2: 0
2. FEELING DOWN, DEPRESSED OR HOPELESS: NOT AT ALL
SUM OF ALL RESPONSES TO PHQ QUESTIONS 1-9: 0

## 2025-02-21 ENCOUNTER — OFFICE VISIT (OUTPATIENT)
Dept: INTERNAL MEDICINE CLINIC | Facility: CLINIC | Age: 65
End: 2025-02-21

## 2025-02-21 VITALS
HEIGHT: 63 IN | DIASTOLIC BLOOD PRESSURE: 70 MMHG | SYSTOLIC BLOOD PRESSURE: 112 MMHG | BODY MASS INDEX: 24.24 KG/M2 | WEIGHT: 136.8 LBS

## 2025-02-21 DIAGNOSIS — F51.01 PRIMARY INSOMNIA: ICD-10-CM

## 2025-02-21 DIAGNOSIS — E78.00 HYPERCHOLESTEROLEMIA: ICD-10-CM

## 2025-02-21 DIAGNOSIS — I45.10 RBBB: ICD-10-CM

## 2025-02-21 DIAGNOSIS — Z00.00 WELLNESS EXAMINATION: Primary | ICD-10-CM

## 2025-02-21 DIAGNOSIS — L65.8 FEMALE PATTERN HAIR LOSS: ICD-10-CM

## 2025-02-21 DIAGNOSIS — I10 ESSENTIAL HYPERTENSION: ICD-10-CM

## 2025-02-21 DIAGNOSIS — E78.00 HYPERCHOLESTEREMIA: ICD-10-CM

## 2025-02-21 RX ORDER — DICLOFENAC POTASSIUM 50 MG/1
TABLET, FILM COATED ORAL
COMMUNITY
Start: 2024-12-12

## 2025-02-21 RX ORDER — MINOXIDIL 2.5 MG/1
2.5 TABLET ORAL DAILY
Qty: 90 TABLET | Refills: 1 | Status: SHIPPED | OUTPATIENT
Start: 2025-02-21 | End: 2026-02-21

## 2025-02-21 RX ORDER — SEMAGLUTIDE 1.7 MG/.75ML
1.7 INJECTION, SOLUTION SUBCUTANEOUS
COMMUNITY

## 2025-02-21 RX ORDER — CLONAZEPAM 1 MG/1
TABLET ORAL
Qty: 30 TABLET | Refills: 5 | Status: SHIPPED | OUTPATIENT
Start: 2025-02-21 | End: 2025-03-27

## 2025-02-21 SDOH — ECONOMIC STABILITY: FOOD INSECURITY: WITHIN THE PAST 12 MONTHS, YOU WORRIED THAT YOUR FOOD WOULD RUN OUT BEFORE YOU GOT MONEY TO BUY MORE.: NEVER TRUE

## 2025-02-21 SDOH — ECONOMIC STABILITY: FOOD INSECURITY: WITHIN THE PAST 12 MONTHS, THE FOOD YOU BOUGHT JUST DIDN'T LAST AND YOU DIDN'T HAVE MONEY TO GET MORE.: NEVER TRUE

## 2025-02-21 ASSESSMENT — ENCOUNTER SYMPTOMS
SHORTNESS OF BREATH: 0
ROS SKIN COMMENTS: HAIR THINNING
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
## 2025-03-17 ENCOUNTER — RESULTS FOLLOW-UP (OUTPATIENT)
Dept: INTERNAL MEDICINE CLINIC | Facility: CLINIC | Age: 65
End: 2025-03-17

## 2025-03-21 ENCOUNTER — RESULTS FOLLOW-UP (OUTPATIENT)
Dept: CARDIOLOGY CLINIC | Age: 65
End: 2025-03-21

## 2025-04-11 ENCOUNTER — TELEPHONE (OUTPATIENT)
Dept: INTERNAL MEDICINE CLINIC | Facility: CLINIC | Age: 65
End: 2025-04-11

## 2025-04-11 NOTE — TELEPHONE ENCOUNTER
Pt came by with BP diary. Her readings in the past 10 days have been in the range of 97/62, 103/68, 106/56 and 100/50. Has recently lost some weight. She is currently taking Olmesartan 20 mg daily. Per Dr WEST, \"Pt can stop the Olmesartan.\" Pt notified.

## 2025-05-19 RX ORDER — OLMESARTAN MEDOXOMIL 20 MG/1
20 TABLET ORAL DAILY
Qty: 90 TABLET | Refills: 3 | Status: SHIPPED | OUTPATIENT
Start: 2025-05-19

## 2025-06-05 RX ORDER — BUSPIRONE HYDROCHLORIDE 10 MG/1
10 TABLET ORAL DAILY
Qty: 90 TABLET | Refills: 1 | Status: SHIPPED | OUTPATIENT
Start: 2025-06-05

## 2025-07-21 DIAGNOSIS — E89.0 POSTOPERATIVE HYPOTHYROIDISM: ICD-10-CM

## 2025-07-21 DIAGNOSIS — I10 ESSENTIAL HYPERTENSION: Primary | ICD-10-CM

## 2025-07-21 DIAGNOSIS — E78.00 HYPERCHOLESTEREMIA: ICD-10-CM

## 2025-07-21 DIAGNOSIS — R73.03 PREDIABETES: ICD-10-CM

## 2025-07-29 DIAGNOSIS — F51.01 PRIMARY INSOMNIA: ICD-10-CM

## 2025-07-29 RX ORDER — CLONAZEPAM 1 MG/1
TABLET ORAL
Qty: 30 TABLET | Refills: 5 | Status: SHIPPED | OUTPATIENT
Start: 2025-07-29 | End: 2025-09-01

## 2025-07-30 DIAGNOSIS — E78.00 HYPERCHOLESTEREMIA: ICD-10-CM

## 2025-07-30 DIAGNOSIS — R73.03 PREDIABETES: ICD-10-CM

## 2025-07-30 DIAGNOSIS — E89.0 POSTOPERATIVE HYPOTHYROIDISM: ICD-10-CM

## 2025-07-30 DIAGNOSIS — I10 ESSENTIAL HYPERTENSION: ICD-10-CM

## 2025-07-30 LAB
ALBUMIN SERPL-MCNC: 3.5 G/DL (ref 3.2–4.6)
ALBUMIN/GLOB SERPL: 1.1 (ref 1–1.9)
ALP SERPL-CCNC: 74 U/L (ref 35–104)
ALT SERPL-CCNC: 17 U/L (ref 8–45)
ANION GAP SERPL CALC-SCNC: 10 MMOL/L (ref 7–16)
AST SERPL-CCNC: 19 U/L (ref 15–37)
BASOPHILS # BLD: 0.08 K/UL (ref 0–0.2)
BASOPHILS NFR BLD: 0.9 % (ref 0–2)
BILIRUB SERPL-MCNC: 0.7 MG/DL (ref 0–1.2)
BUN SERPL-MCNC: 19 MG/DL (ref 8–23)
CALCIUM SERPL-MCNC: 9.3 MG/DL (ref 8.8–10.2)
CHLORIDE SERPL-SCNC: 104 MMOL/L (ref 98–107)
CHOLEST SERPL-MCNC: 187 MG/DL (ref 0–200)
CO2 SERPL-SCNC: 26 MMOL/L (ref 20–29)
CREAT SERPL-MCNC: 0.8 MG/DL (ref 0.6–1.1)
DIFFERENTIAL METHOD BLD: ABNORMAL
EOSINOPHIL # BLD: 0.17 K/UL (ref 0–0.8)
EOSINOPHIL NFR BLD: 1.9 % (ref 0.5–7.8)
ERYTHROCYTE [DISTWIDTH] IN BLOOD BY AUTOMATED COUNT: 13.7 % (ref 11.9–14.6)
EST. AVERAGE GLUCOSE BLD GHB EST-MCNC: 103 MG/DL
GLOBULIN SER CALC-MCNC: 3.1 G/DL (ref 2.3–3.5)
GLUCOSE SERPL-MCNC: 92 MG/DL (ref 70–99)
HBA1C MFR BLD: 5.2 % (ref 0–5.6)
HCT VFR BLD AUTO: 40.9 % (ref 35.8–46.3)
HDLC SERPL-MCNC: 48 MG/DL (ref 40–60)
HDLC SERPL: 3.9 (ref 0–5)
HGB BLD-MCNC: 13.2 G/DL (ref 11.7–15.4)
IMM GRANULOCYTES # BLD AUTO: 0.03 K/UL (ref 0–0.5)
IMM GRANULOCYTES NFR BLD AUTO: 0.3 % (ref 0–5)
LDLC SERPL CALC-MCNC: 125 MG/DL (ref 0–100)
LYMPHOCYTES # BLD: 2.38 K/UL (ref 0.5–4.6)
LYMPHOCYTES NFR BLD: 26.2 % (ref 13–44)
MCH RBC QN AUTO: 31.5 PG (ref 26.1–32.9)
MCHC RBC AUTO-ENTMCNC: 32.3 G/DL (ref 31.4–35)
MCV RBC AUTO: 97.6 FL (ref 82–102)
MONOCYTES # BLD: 1.37 K/UL (ref 0.1–1.3)
MONOCYTES NFR BLD: 15.1 % (ref 4–12)
NEUTS SEG # BLD: 5.07 K/UL (ref 1.7–8.2)
NEUTS SEG NFR BLD: 55.6 % (ref 43–78)
NRBC # BLD: 0 K/UL (ref 0–0.2)
PLATELET # BLD AUTO: 316 K/UL (ref 150–450)
PMV BLD AUTO: 9.6 FL (ref 9.4–12.3)
POTASSIUM SERPL-SCNC: 3.9 MMOL/L (ref 3.5–5.1)
PROT SERPL-MCNC: 6.5 G/DL (ref 6.3–8.2)
RBC # BLD AUTO: 4.19 M/UL (ref 4.05–5.2)
SODIUM SERPL-SCNC: 140 MMOL/L (ref 136–145)
TRIGL SERPL-MCNC: 68 MG/DL (ref 0–150)
TSH, 3RD GENERATION: 2.02 UIU/ML (ref 0.27–4.2)
VLDLC SERPL CALC-MCNC: 14 MG/DL (ref 6–23)
WBC # BLD AUTO: 9.1 K/UL (ref 4.3–11.1)

## 2025-08-04 ENCOUNTER — OFFICE VISIT (OUTPATIENT)
Dept: INTERNAL MEDICINE CLINIC | Facility: CLINIC | Age: 65
End: 2025-08-04

## 2025-08-04 VITALS
BODY MASS INDEX: 22.86 KG/M2 | SYSTOLIC BLOOD PRESSURE: 112 MMHG | WEIGHT: 129 LBS | HEIGHT: 63 IN | DIASTOLIC BLOOD PRESSURE: 60 MMHG

## 2025-08-04 DIAGNOSIS — I10 ESSENTIAL HYPERTENSION: ICD-10-CM

## 2025-08-04 DIAGNOSIS — E89.0 POSTOPERATIVE HYPOTHYROIDISM: Primary | ICD-10-CM

## 2025-08-04 DIAGNOSIS — G43.819 OTHER MIGRAINE WITHOUT STATUS MIGRAINOSUS, INTRACTABLE: ICD-10-CM

## 2025-08-04 DIAGNOSIS — F51.01 PRIMARY INSOMNIA: ICD-10-CM

## 2025-08-04 DIAGNOSIS — L65.8 FEMALE PATTERN HAIR LOSS: ICD-10-CM

## 2025-08-04 DIAGNOSIS — M06.4 INFLAMMATORY POLYARTHRITIS (HCC): ICD-10-CM

## 2025-08-04 PROBLEM — E04.2 MULTINODULAR GOITER: Status: RESOLVED | Noted: 2018-02-01 | Resolved: 2025-08-04

## 2025-08-04 PROBLEM — R73.03 PREDIABETES: Status: RESOLVED | Noted: 2017-03-27 | Resolved: 2025-08-04

## 2025-08-04 PROBLEM — G43.909 MIGRAINE: Status: ACTIVE | Noted: 2025-08-04

## 2025-08-04 RX ORDER — MINOXIDIL 2.5 MG/1
2.5 TABLET ORAL DAILY
Qty: 90 TABLET | Refills: 2 | Status: SHIPPED | OUTPATIENT
Start: 2025-08-04 | End: 2026-08-04

## 2025-08-04 RX ORDER — ELETRIPTAN HYDROBROMIDE 20 MG/1
TABLET, FILM COATED ORAL
Qty: 18 TABLET | Refills: 2 | Status: SHIPPED | OUTPATIENT
Start: 2025-08-04 | End: 2025-08-04 | Stop reason: SDUPTHER

## 2025-08-04 RX ORDER — ELETRIPTAN HYDROBROMIDE 20 MG/1
TABLET, FILM COATED ORAL
Qty: 18 TABLET | Refills: 2 | Status: SHIPPED | OUTPATIENT
Start: 2025-08-04

## 2025-08-04 ASSESSMENT — ENCOUNTER SYMPTOMS: SHORTNESS OF BREATH: 0

## 2025-08-12 RX ORDER — ONDANSETRON 4 MG/1
4 TABLET, ORALLY DISINTEGRATING ORAL 3 TIMES DAILY PRN
Qty: 21 TABLET | Refills: 0 | Status: SHIPPED | OUTPATIENT
Start: 2025-08-12

## 2025-09-02 ENCOUNTER — TRANSCRIBE ORDERS (OUTPATIENT)
Dept: SCHEDULING | Age: 65
End: 2025-09-02

## 2025-09-02 DIAGNOSIS — Z12.31 ENCOUNTER FOR SCREENING MAMMOGRAM FOR MALIGNANT NEOPLASM OF BREAST: Primary | ICD-10-CM

## (undated) DEVICE — SUTURE MCRYL SZ 3-0 L27IN ABSRB UD L19MM PS-2 3/8 CIR PRIM Y427H

## (undated) DEVICE — DRSG GZ OIL EMUL CURAD 3X8 --

## (undated) DEVICE — GOWN,REINF,POLY,ECL,PP SLV,XL: Brand: MEDLINE

## (undated) DEVICE — BUTTON SWITCH PENCIL BLADE ELECTRODE, HOLSTER: Brand: EDGE

## (undated) DEVICE — 40585 XL ADVANCED TRENDELENBURG POSITIONING KIT: Brand: 40585 XL ADVANCED TRENDELENBURG POSITIONING KIT

## (undated) DEVICE — PADDING CAST W2INXL4YD ST COT COHESIVE HND TEARABLE SPEC

## (undated) DEVICE — SUT ETHLN 3-0 18IN PS2 BLK --

## (undated) DEVICE — (D)PREP SKN CHLRAPRP APPL 26ML -- CONVERT TO ITEM 371833

## (undated) DEVICE — DRAPE SHT 3 QTR PROXIMA 53X77 --

## (undated) DEVICE — HYPODERMIC SAFETY NEEDLE: Brand: MAGELLAN

## (undated) DEVICE — SUTURE PDS II SZ 2-0 L27IN ABSRB VLT L36MM CT-1 1/2 CIR Z339H

## (undated) DEVICE — BANDAGE COMPR W4INXL9FT SGL LAYERED NO CLSR EXSANG ESMARCH

## (undated) DEVICE — PAD MATERNITY 11IN W/TAILS -- STRL

## (undated) DEVICE — GLOVE SURG SZ 8 L11.77IN FNGR THK9.8MIL STRW LTX POLYMER

## (undated) DEVICE — AMD ANTIMICROBIAL GAUZE SPONGES,12 PLY USP TYPE VII, 0.2% POLYHEXAMETHYLENE BIGUANIDE HCI (PHMB): Brand: CURITY

## (undated) DEVICE — DEVICE SUT SHFT DIA3MM HD W6.3MM CARR DIA1.2MM 38.2GM FOR

## (undated) DEVICE — COVER,MAYO STAND,STERILE: Brand: MEDLINE

## (undated) DEVICE — CYSTO/BLADDER IRRIGATION SET, REGULATING CLAMP

## (undated) DEVICE — REM POLYHESIVE ADULT PATIENT RETURN ELECTRODE: Brand: VALLEYLAB

## (undated) DEVICE — TUBING, SUCTION, 1/4" X 10', STRAIGHT: Brand: MEDLINE

## (undated) DEVICE — CONTROL SYRINGE LUER-LOCK TIP: Brand: MONOJECT

## (undated) DEVICE — SUTURE VCRL SZ 2-0 L36IN ABSRB UD L36MM CT-1 1/2 CIR J945H

## (undated) DEVICE — SYRINGE IRRIG 60ML SFT PLIABLE BLB EZ TO GRP 1 HND USE W/

## (undated) DEVICE — BNDG SOF-FORM 2X75 STRL LF --

## (undated) DEVICE — TRAY PREP DRY W/ PREM GLV 2 APPL 6 SPNG 2 UNDPD 1 OVERWRAP

## (undated) DEVICE — Device

## (undated) DEVICE — 2000CC GUARDIAN II: Brand: GUARDIAN

## (undated) DEVICE — Z DISCONTINUED USE (MFG CAT 7984-37) SOLUTION IV SODIUM CHL 0.9% 100 ML INJ

## (undated) DEVICE — 3M™ COBAN™ NL STERILE NON-LATEX SELF-ADHERENT WRAP, 2084S, 4 IN X 5 YD (10 CM X 4,5 M), 18 ROLLS/CASE: Brand: 3M™ COBAN™

## (undated) DEVICE — CARDINAL HEALTH FLEXIBLE LIGHT HANDLE COVER: Brand: CARDINAL HEALTH

## (undated) DEVICE — GARMENT,MEDLINE,DVT,INT,CALF,MED, GEN2: Brand: MEDLINE

## (undated) DEVICE — ZIMMER® STERILE DISPOSABLE TOURNIQUET CUFF, DUAL PORT, DUAL BLADDER, 18 IN. (46 CM)

## (undated) DEVICE — SYR 10ML LUER LOK 1/5ML GRAD --

## (undated) DEVICE — SOLUTION IV 1000ML 0.9% SOD CHL

## (undated) DEVICE — SOLUTION IRRIGATION H2O 0797305] ICU MEDICAL INC]

## (undated) DEVICE — LITHOTOMY: Brand: MEDLINE INDUSTRIES, INC.

## (undated) DEVICE — JELLY,LUBE,STERILE,FLIP TOP,TUBE,4-OZ: Brand: MEDLINE

## (undated) DEVICE — BANDAGE COMPR 9 FTX4 IN SMOOTH COMFORTABLE SYNTH ESMRK LF

## (undated) DEVICE — NEEDLE HYPO 21GA L1.5IN INTRAMUSCULAR S STL LATCH BVL UP

## (undated) DEVICE — YANKAUER,BULB TIP,W/O VENT,RIGID,STERILE: Brand: MEDLINE

## (undated) DEVICE — RING RETRCTR FIG 8 32.5X18.3CM -- PLAS STRL W/2 CATH CLIPS

## (undated) DEVICE — SUTURE CAPIO MONODEK SZ 0 L48IN ABSRB 1/2 CIR DBL ARMED 833137

## (undated) DEVICE — GOWN,REINFRCE,POLY,ECLIPSE,SLV,3XLG: Brand: MEDLINE

## (undated) DEVICE — TOTAL 1-LAYER TRAY, LATEX FOLEY, 16FR 10: Brand: MEDLINE

## (undated) DEVICE — DRAPE,LITHOTOMY,STERILE: Brand: MEDLINE

## (undated) DEVICE — HOOK RETRCT L5MM E SHRP SELF RET SYS LONE STAR